# Patient Record
Sex: FEMALE | Race: WHITE | Employment: FULL TIME | URBAN - METROPOLITAN AREA
[De-identification: names, ages, dates, MRNs, and addresses within clinical notes are randomized per-mention and may not be internally consistent; named-entity substitution may affect disease eponyms.]

---

## 2017-02-28 ENCOUNTER — HOSPITAL ENCOUNTER (OUTPATIENT)
Dept: ULTRASOUND IMAGING | Age: 32
Discharge: HOME | End: 2017-02-28
Attending: NURSE PRACTITIONER
Payer: COMMERCIAL

## 2017-02-28 DIAGNOSIS — M79.605 PAIN OF LEFT LOWER EXTREMITY: ICD-10-CM

## 2017-02-28 PROCEDURE — 93971 EXTREMITY STUDY: CPT

## 2017-03-28 PROBLEM — I83.892 SYMPTOMATIC VARICOSE VEINS OF LEFT LOWER EXTREMITY: Chronic | Status: ACTIVE | Noted: 2017-03-28

## 2017-06-12 ENCOUNTER — HOSPITAL ENCOUNTER (OUTPATIENT)
Dept: LAB | Age: 32
Discharge: HOME | End: 2017-06-12
Payer: COMMERCIAL

## 2017-06-12 LAB — COLLECTION COMMENT, COLCM: NORMAL

## 2017-06-12 PROCEDURE — 87070 CULTURE OTHR SPECIMN AEROBIC: CPT | Performed by: OTOLARYNGOLOGY

## 2017-06-12 PROCEDURE — 87077 CULTURE AEROBIC IDENTIFY: CPT | Performed by: OTOLARYNGOLOGY

## 2017-06-12 PROCEDURE — 87186 SC STD MICRODIL/AGAR DIL: CPT | Performed by: OTOLARYNGOLOGY

## 2017-06-14 LAB
BACTERIA SPEC CULT: ABNORMAL
SERVICE CMNT-IMP: ABNORMAL

## 2017-07-21 ENCOUNTER — HOSPITAL ENCOUNTER (OUTPATIENT)
Dept: CT IMAGING | Age: 32
Discharge: HOME | End: 2017-07-21
Attending: FAMILY MEDICINE
Payer: COMMERCIAL

## 2017-07-21 DIAGNOSIS — J32.9 CHRONIC SINUSITIS, UNSPECIFIED LOCATION: ICD-10-CM

## 2017-07-21 PROCEDURE — 70486 CT MAXILLOFACIAL W/O DYE: CPT

## 2017-11-16 ENCOUNTER — HOSPITAL ENCOUNTER (OUTPATIENT)
Dept: CT IMAGING | Age: 32
Discharge: HOME | End: 2017-11-16
Attending: UROLOGY
Payer: COMMERCIAL

## 2017-11-16 DIAGNOSIS — R10.9 FLANK PAIN: ICD-10-CM

## 2017-11-16 PROCEDURE — 74176 CT ABD & PELVIS W/O CONTRAST: CPT

## 2018-03-30 ENCOUNTER — APPOINTMENT (OUTPATIENT)
Dept: ULTRASOUND IMAGING | Age: 33
End: 2018-03-30
Attending: EMERGENCY MEDICINE
Payer: COMMERCIAL

## 2018-03-30 ENCOUNTER — HOSPITAL ENCOUNTER (EMERGENCY)
Age: 33
Discharge: HOME | End: 2018-03-30
Attending: EMERGENCY MEDICINE
Payer: COMMERCIAL

## 2018-03-30 ENCOUNTER — APPOINTMENT (OUTPATIENT)
Dept: CT IMAGING | Age: 33
End: 2018-03-30
Attending: EMERGENCY MEDICINE
Payer: COMMERCIAL

## 2018-03-30 VITALS
OXYGEN SATURATION: 99 % | RESPIRATION RATE: 18 BRPM | HEIGHT: 61 IN | SYSTOLIC BLOOD PRESSURE: 111 MMHG | WEIGHT: 170 LBS | HEART RATE: 89 BPM | BODY MASS INDEX: 32.1 KG/M2 | TEMPERATURE: 98.1 F | DIASTOLIC BLOOD PRESSURE: 71 MMHG

## 2018-03-30 DIAGNOSIS — R10.84 ABDOMINAL PAIN, GENERALIZED: Primary | ICD-10-CM

## 2018-03-30 LAB
ALBUMIN SERPL-MCNC: 3.3 G/DL (ref 3.5–5)
ALBUMIN/GLOB SERPL: 1 {RATIO} (ref 1.2–3.5)
ALP SERPL-CCNC: 50 U/L (ref 50–136)
ALT SERPL-CCNC: 20 U/L (ref 12–65)
ANION GAP SERPL CALC-SCNC: 6 MMOL/L (ref 7–16)
AST SERPL-CCNC: 22 U/L (ref 15–37)
BASOPHILS # BLD: 0 K/UL (ref 0–0.2)
BASOPHILS NFR BLD: 1 % (ref 0–2)
BILIRUB SERPL-MCNC: 0.2 MG/DL (ref 0.2–1.1)
BUN SERPL-MCNC: 13 MG/DL (ref 6–23)
CALCIUM SERPL-MCNC: 8.6 MG/DL (ref 8.3–10.4)
CHLORIDE SERPL-SCNC: 109 MMOL/L (ref 98–107)
CO2 SERPL-SCNC: 26 MMOL/L (ref 21–32)
CREAT SERPL-MCNC: 0.79 MG/DL (ref 0.6–1)
DIFFERENTIAL METHOD BLD: ABNORMAL
EOSINOPHIL # BLD: 0.2 K/UL (ref 0–0.8)
EOSINOPHIL NFR BLD: 4 % (ref 0.5–7.8)
ERYTHROCYTE [DISTWIDTH] IN BLOOD BY AUTOMATED COUNT: 18.3 % (ref 11.9–14.6)
GLOBULIN SER CALC-MCNC: 3.4 G/DL (ref 2.3–3.5)
GLUCOSE SERPL-MCNC: 88 MG/DL (ref 65–100)
HCG UR QL: NEGATIVE
HCT VFR BLD AUTO: 27.2 % (ref 35.8–46.3)
HGB BLD-MCNC: 8.2 G/DL (ref 11.7–15.4)
IMM GRANULOCYTES # BLD: 0 K/UL (ref 0–0.5)
IMM GRANULOCYTES NFR BLD AUTO: 0 % (ref 0–5)
LIPASE SERPL-CCNC: 95 U/L (ref 73–393)
LYMPHOCYTES # BLD: 2.6 K/UL (ref 0.5–4.6)
LYMPHOCYTES NFR BLD: 43 % (ref 13–44)
MCH RBC QN AUTO: 20.7 PG (ref 26.1–32.9)
MCHC RBC AUTO-ENTMCNC: 30.1 G/DL (ref 31.4–35)
MCV RBC AUTO: 68.7 FL (ref 79.6–97.8)
MONOCYTES # BLD: 0.5 K/UL (ref 0.1–1.3)
MONOCYTES NFR BLD: 8 % (ref 4–12)
NEUTS SEG # BLD: 2.7 K/UL (ref 1.7–8.2)
NEUTS SEG NFR BLD: 44 % (ref 43–78)
PLATELET # BLD AUTO: 403 K/UL (ref 150–450)
PMV BLD AUTO: 9.2 FL (ref 10.8–14.1)
POTASSIUM SERPL-SCNC: 3.6 MMOL/L (ref 3.5–5.1)
PROT SERPL-MCNC: 6.7 G/DL (ref 6.3–8.2)
RBC # BLD AUTO: 3.96 M/UL (ref 4.05–5.25)
SODIUM SERPL-SCNC: 141 MMOL/L (ref 136–145)
WBC # BLD AUTO: 6.1 K/UL (ref 4.3–11.1)

## 2018-03-30 PROCEDURE — 74011636320 HC RX REV CODE- 636/320: Performed by: EMERGENCY MEDICINE

## 2018-03-30 PROCEDURE — 76856 US EXAM PELVIC COMPLETE: CPT

## 2018-03-30 PROCEDURE — 96374 THER/PROPH/DIAG INJ IV PUSH: CPT | Performed by: EMERGENCY MEDICINE

## 2018-03-30 PROCEDURE — 74011250637 HC RX REV CODE- 250/637: Performed by: EMERGENCY MEDICINE

## 2018-03-30 PROCEDURE — 74177 CT ABD & PELVIS W/CONTRAST: CPT

## 2018-03-30 PROCEDURE — 96361 HYDRATE IV INFUSION ADD-ON: CPT | Performed by: EMERGENCY MEDICINE

## 2018-03-30 PROCEDURE — 85025 COMPLETE CBC W/AUTO DIFF WBC: CPT | Performed by: EMERGENCY MEDICINE

## 2018-03-30 PROCEDURE — 74011250636 HC RX REV CODE- 250/636: Performed by: EMERGENCY MEDICINE

## 2018-03-30 PROCEDURE — 96375 TX/PRO/DX INJ NEW DRUG ADDON: CPT | Performed by: EMERGENCY MEDICINE

## 2018-03-30 PROCEDURE — 80053 COMPREHEN METABOLIC PANEL: CPT | Performed by: EMERGENCY MEDICINE

## 2018-03-30 PROCEDURE — 74011000258 HC RX REV CODE- 258: Performed by: EMERGENCY MEDICINE

## 2018-03-30 PROCEDURE — 83690 ASSAY OF LIPASE: CPT | Performed by: EMERGENCY MEDICINE

## 2018-03-30 PROCEDURE — 81003 URINALYSIS AUTO W/O SCOPE: CPT | Performed by: EMERGENCY MEDICINE

## 2018-03-30 PROCEDURE — 96376 TX/PRO/DX INJ SAME DRUG ADON: CPT | Performed by: EMERGENCY MEDICINE

## 2018-03-30 PROCEDURE — 99284 EMERGENCY DEPT VISIT MOD MDM: CPT | Performed by: EMERGENCY MEDICINE

## 2018-03-30 PROCEDURE — 81025 URINE PREGNANCY TEST: CPT

## 2018-03-30 RX ORDER — MORPHINE SULFATE 4 MG/ML
4 INJECTION, SOLUTION INTRAMUSCULAR; INTRAVENOUS
Status: COMPLETED | OUTPATIENT
Start: 2018-03-30 | End: 2018-03-30

## 2018-03-30 RX ORDER — TRAMADOL HYDROCHLORIDE 50 MG/1
50-100 TABLET ORAL
Qty: 13 TAB | Refills: 0 | Status: SHIPPED | OUTPATIENT
Start: 2018-03-30 | End: 2018-04-04 | Stop reason: CLARIF

## 2018-03-30 RX ORDER — DICLOFENAC SODIUM 50 MG/1
50 TABLET, DELAYED RELEASE ORAL 2 TIMES DAILY
Qty: 14 TAB | Refills: 0 | Status: SHIPPED | OUTPATIENT
Start: 2018-03-30 | End: 2018-03-30

## 2018-03-30 RX ORDER — SODIUM CHLORIDE 0.9 % (FLUSH) 0.9 %
10 SYRINGE (ML) INJECTION
Status: COMPLETED | OUTPATIENT
Start: 2018-03-30 | End: 2018-03-30

## 2018-03-30 RX ORDER — ACETAMINOPHEN 500 MG
1000 TABLET ORAL
Status: COMPLETED | OUTPATIENT
Start: 2018-03-30 | End: 2018-03-30

## 2018-03-30 RX ORDER — ONDANSETRON 2 MG/ML
4 INJECTION INTRAMUSCULAR; INTRAVENOUS
Status: COMPLETED | OUTPATIENT
Start: 2018-03-30 | End: 2018-03-30

## 2018-03-30 RX ADMIN — SODIUM CHLORIDE 1000 ML: 900 INJECTION, SOLUTION INTRAVENOUS at 12:26

## 2018-03-30 RX ADMIN — IOPAMIDOL 100 ML: 755 INJECTION, SOLUTION INTRAVENOUS at 13:53

## 2018-03-30 RX ADMIN — MORPHINE SULFATE 4 MG: 4 INJECTION, SOLUTION INTRAMUSCULAR; INTRAVENOUS at 13:13

## 2018-03-30 RX ADMIN — SODIUM CHLORIDE 100 ML: 900 INJECTION, SOLUTION INTRAVENOUS at 13:53

## 2018-03-30 RX ADMIN — DIATRIZOATE MEGLUMINE AND DIATRIZOATE SODIUM 15 ML: 600; 100 SOLUTION ORAL; RECTAL at 12:13

## 2018-03-30 RX ADMIN — ACETAMINOPHEN 1000 MG: 500 TABLET, FILM COATED ORAL at 16:37

## 2018-03-30 RX ADMIN — MORPHINE SULFATE 4 MG: 4 INJECTION, SOLUTION INTRAMUSCULAR; INTRAVENOUS at 12:27

## 2018-03-30 RX ADMIN — ONDANSETRON 4 MG: 2 INJECTION INTRAMUSCULAR; INTRAVENOUS at 13:13

## 2018-03-30 RX ADMIN — Medication 10 ML: at 13:53

## 2018-03-30 RX ADMIN — ONDANSETRON 4 MG: 2 INJECTION INTRAMUSCULAR; INTRAVENOUS at 12:27

## 2018-03-30 NOTE — LETTER
[address] [facility] EMERGENCY DEPT One [address] [place] [id number] 
[phone] Work/School Note Date: 3/30/2018 To Whom It May concern: 
 
Maia Terry was seen and treated today in the emergency room by the following provider(s): 
Attending Provider: Clarke Ahumada MD. Maia Terry please excuse her from work for up to 2 days due to her emergency department visit. Sincerely, Clarke Ahumada MD

## 2018-03-30 NOTE — ED NOTES
I have reviewed discharge instructions with the patient. The patient verbalized understanding. Patient left ED via Discharge Method: ambulatory to Home with self. Opportunity for questions and clarification provided. Patient given 1 scripts. To continue your aftercare when you leave the hospital, you may receive an automated call from our care team to check in on how you are doing. This is a free service and part of our promise to provide the best care and service to meet your aftercare needs.  If you have questions, or wish to unsubscribe from this service please call [phone]. Thank you for Choosing our [facility].

## 2018-03-30 NOTE — DISCHARGE INSTRUCTIONS
As we discussed, we did not find the exact cause of your symptoms today in the emergency department. You did have a small ovarian cyst certainly could be contributing to your symptoms. Therefore, it is important for you to follow up with your primary care doctor or your gynecologist for further evaluation. Please return to the emergency department with any fevers, vomiting, pelvic symptoms, or additional concerns.

## 2018-03-30 NOTE — ED PROVIDER NOTES
HPI Comments: 37-year-old lady presents with concerns about pain in her right side. She says this pain has gotten worse over the past couple of days and it feels like the right half of her abdomen is becoming firm. She notes that she has a history of a gastric bypass surgery about 8 or 9 years ago and she's only had one complication, and ulcer since then. With this pain she has had some nausea and nonbloody nonbilious vomiting. She has also had diarrhea. She has had no blood in her bowels. She says that during the bypass they did take her gallbladder but it did not take her appendix or pelvic organs. Elements of this note were created using speech recognition software. As such, errors of speech recognition may be present. Patient is a 34 y.o. female presenting with abdominal pain. The history is provided by the patient. Abdominal Pain    Associated symptoms include diarrhea, nausea and vomiting. Pertinent negatives include no fever, no dysuria, no hematuria, no headaches, no arthralgias, no myalgias and no chest pain.         Past Medical History:   Diagnosis Date    Allergic rhinitis     Alopecia areata 2015    Dr Aury Noriega: s/p steroid injection    Asthma dx age 25    albuterol inhaler as needed- last needed 12/2015    Asthma exacerbation 12/21/2015    Contraception management 1/21/2014    Encounter for insertion of mirena IUD 12/17/2015    GERD (gastroesophageal reflux disease)     Headache     LSIL (low grade squamous intraepithelial lesion) on Pap smear 7/7/2014    Nasal polyposis     Recurrent cystitis     neg cx, renal ultrasound normal, s/p uro evla, deferred cysto, possible IC    SVT (supraventricular tachycardia) ([address] 12/4/2013    s/p echo (2013- normal); occasionally will have palpitations but denies any medications at this time    Symptomatic varicose veins of left lower extremity 3/28/2017    Thromboembolus ([address]     Ulcer ([address] 8/2012    revision of gastric bypass (excision of ulcer); managed with protonix BID    UTI (urinary tract infection)     Vaginitis     Varicose veins of legs        Past Surgical History:   Procedure Laterality Date    HX GASTRIC BYPASS  08/2009     and 2012 was revision with excision ulcer    HX GYN      x2 vaginal birth    HX LAP CHOLECYSTECTOMY  2010    HX OTHER SURGICAL  2010,7/2016    scraped sinuses/ulcer surgery    HX TONSILLECTOMY  2008    HX WISDOM TEETH EXTRACTION      SINUS SURGERY [address] UNLISTED  2008, 2017    Deviated septum         Family History:   Problem Relation Age of Onset    Thyroid Disease Mother      Graves Disease    Other Mother      VV    Cancer Maternal Grandfather      Lung    Emphysema Paternal Grandmother     Breast Cancer Maternal Aunt     Other Maternal Aunt      vv       Social History     Social History    Marital status: SINGLE     Spouse name: N/A    Number of children: N/A    Years of education: N/A     Occupational History    Not on file. Social History Main Topics    Smoking status: Never Smoker    Smokeless tobacco: Never Used    Alcohol use No    Drug use: No    Sexual activity: Yes     Partners: Male     Birth control/ protection: IUD     Other Topics Concern    Not on file     Social History Narrative    Has 2 children    [employer] Employee         ALLERGIES: Lactose; Levaquin [levofloxacin]; and Penicillins    Review of Systems   Constitutional: Negative for chills, diaphoresis and fever. HENT: Negative for congestion, rhinorrhea and sore throat. Eyes: Negative for redness and visual disturbance. Respiratory: Negative for cough, chest tightness, shortness of breath and wheezing. Cardiovascular: Negative for chest pain and palpitations. Gastrointestinal: Positive for abdominal pain, diarrhea, nausea and vomiting. Negative for blood in stool. Endocrine: Negative for polydipsia and polyuria. Genitourinary: Negative for dysuria and hematuria.    Musculoskeletal: Negative for arthralgias, myalgias and neck stiffness. Skin: Negative for rash. Allergic/Immunologic: Negative for environmental allergies and food allergies. Neurological: Negative for dizziness, weakness and headaches. Hematological: Negative for adenopathy. Does not bruise/bleed easily. Psychiatric/Behavioral: Negative for confusion and sleep disturbance. The patient is not nervous/anxious. Vitals:    03/30/18 1159   BP: 114/81   Pulse: 96   Resp: 18   Temp: 98.8 °F (37.1 °C)   SpO2: 100%   Weight: 77.1 kg (170 lb)   Height: 5' 1\" (1.549 m)            Physical Exam   Constitutional: She is oriented to person, place, and time. She appears well-developed and well-nourished. HENT:   Head: Normocephalic and atraumatic. Eyes: Conjunctivae and EOM are normal. Pupils are equal, round, and reactive to light. Neck: Normal range of motion. Cardiovascular: Normal rate and regular rhythm. Pulmonary/Chest: Effort normal and breath sounds normal. No respiratory distress. She has no wheezes. She has no rales. She exhibits no tenderness. Abdominal: Soft. Bowel sounds are normal. There is tenderness. There is no rebound and no guarding. Patient has tenderness in her right upper and right lower quadrant with no rebound or guarding. She has normal bowel sounds   Musculoskeletal: Normal range of motion. She exhibits no edema or tenderness. Lymphadenopathy:     She has no cervical adenopathy. Neurological: She is alert and oriented to person, place, and time. Skin: Skin is warm and dry. Psychiatric: She has a normal mood and affect. Nursing note and vitals reviewed. MDM  Number of Diagnoses or Management Options  Diagnosis management comments: Given her history of previous surgery I will get a CT scan for further evaluation. I will also check her lipase, her LFTs, and her CBC. I will treat her symptoms with IV morphine and Zofran and then progress from there.     3:06 PM  CT scan showed some pelvic fluid but no other findings at this point.   I will do a ultrasound to evaluate for TOA or other pelvic inflammatory process or ovarian cyst.        ED Course       Procedures

## 2018-04-05 ENCOUNTER — ANESTHESIA (OUTPATIENT)
Dept: ENDOSCOPY | Age: 33
End: 2018-04-05
Payer: COMMERCIAL

## 2018-04-05 ENCOUNTER — HOSPITAL ENCOUNTER (OUTPATIENT)
Age: 33
Setting detail: OUTPATIENT SURGERY
Discharge: HOME | End: 2018-04-05
Attending: INTERNAL MEDICINE | Admitting: INTERNAL MEDICINE
Payer: COMMERCIAL

## 2018-04-05 ENCOUNTER — ANESTHESIA EVENT (OUTPATIENT)
Dept: ENDOSCOPY | Age: 33
End: 2018-04-05
Payer: COMMERCIAL

## 2018-04-05 VITALS
HEIGHT: 61 IN | HEART RATE: 78 BPM | RESPIRATION RATE: 12 BRPM | SYSTOLIC BLOOD PRESSURE: 105 MMHG | OXYGEN SATURATION: 99 % | BODY MASS INDEX: 32.1 KG/M2 | DIASTOLIC BLOOD PRESSURE: 70 MMHG | WEIGHT: 170 LBS | TEMPERATURE: 98 F

## 2018-04-05 PROCEDURE — 76060000031 HC ANESTHESIA FIRST 0.5 HR: Performed by: INTERNAL MEDICINE

## 2018-04-05 PROCEDURE — 74011000250 HC RX REV CODE- 250

## 2018-04-05 PROCEDURE — 74011250636 HC RX REV CODE- 250/636: Performed by: ANESTHESIOLOGY

## 2018-04-05 PROCEDURE — 76040000025: Performed by: INTERNAL MEDICINE

## 2018-04-05 PROCEDURE — 74011250636 HC RX REV CODE- 250/636

## 2018-04-05 RX ORDER — PROPOFOL 10 MG/ML
INJECTION, EMULSION INTRAVENOUS
Status: DISCONTINUED | OUTPATIENT
Start: 2018-04-05 | End: 2018-04-05 | Stop reason: HOSPADM

## 2018-04-05 RX ORDER — SODIUM CHLORIDE 9 MG/ML
25 INJECTION, SOLUTION INTRAVENOUS CONTINUOUS
Status: DISCONTINUED | OUTPATIENT
Start: 2018-04-05 | End: 2018-04-05 | Stop reason: HOSPADM

## 2018-04-05 RX ORDER — SODIUM CHLORIDE, SODIUM LACTATE, POTASSIUM CHLORIDE, CALCIUM CHLORIDE 600; 310; 30; 20 MG/100ML; MG/100ML; MG/100ML; MG/100ML
75 INJECTION, SOLUTION INTRAVENOUS CONTINUOUS
Status: DISCONTINUED | OUTPATIENT
Start: 2018-04-05 | End: 2018-04-05 | Stop reason: HOSPADM

## 2018-04-05 RX ORDER — PROPOFOL 10 MG/ML
INJECTION, EMULSION INTRAVENOUS AS NEEDED
Status: DISCONTINUED | OUTPATIENT
Start: 2018-04-05 | End: 2018-04-05 | Stop reason: HOSPADM

## 2018-04-05 RX ORDER — LIDOCAINE HYDROCHLORIDE 20 MG/ML
INJECTION, SOLUTION EPIDURAL; INFILTRATION; INTRACAUDAL; PERINEURAL AS NEEDED
Status: DISCONTINUED | OUTPATIENT
Start: 2018-04-05 | End: 2018-04-05 | Stop reason: HOSPADM

## 2018-04-05 RX ADMIN — LIDOCAINE HYDROCHLORIDE 100 MG: 20 INJECTION, SOLUTION EPIDURAL; INFILTRATION; INTRACAUDAL; PERINEURAL at 08:58

## 2018-04-05 RX ADMIN — SODIUM CHLORIDE, SODIUM LACTATE, POTASSIUM CHLORIDE, AND CALCIUM CHLORIDE 75 ML/HR: 600; 310; 30; 20 INJECTION, SOLUTION INTRAVENOUS at 08:43

## 2018-04-05 RX ADMIN — PROPOFOL 140 MCG/KG/MIN: 10 INJECTION, EMULSION INTRAVENOUS at 08:58

## 2018-04-05 RX ADMIN — PROPOFOL 100 MG: 10 INJECTION, EMULSION INTRAVENOUS at 08:58

## 2018-04-05 NOTE — ANESTHESIA POSTPROCEDURE EVALUATION
Post-Anesthesia Evaluation and Assessment    Patient: Jose Mckay MRN: [id number]  SSN: [id number]    YOB: 1985  Age: 34 y.o. Sex: female       Cardiovascular Function/Vital Signs  Visit Vitals    /70    Pulse 78    Temp 36.7 °C (98 °F)    Resp 14    Ht 5' 1\" (1.549 m)    Wt 77.1 kg (170 lb)    SpO2 99%    BMI 32.12 kg/m2       Patient is status post total IV anesthesia anesthesia for Procedure(s):  ESOPHAGOGASTRODUODENOSCOPY (EGD)/ 34. Nausea/Vomiting: None    Postoperative hydration reviewed and adequate. Pain:  Pain Scale 1: Numeric (0 - 10) (04/05/18 0917)  Pain Intensity 1: 0 (04/05/18 0917)   Managed    Neurological Status: At baseline    Mental Status and Level of Consciousness: Arousable    Pulmonary Status:   O2 Device: Room air (04/05/18 0917)   Adequate oxygenation and airway patent    Complications related to anesthesia: None    Post-anesthesia assessment completed.  No concerns    Signed By: Dipak Chavez MD     April 5, 2018

## 2018-04-05 NOTE — ANESTHESIA PREPROCEDURE EVALUATION
Anesthetic History               Review of Systems / Medical History  Patient summary reviewed    Pulmonary            Asthma        Neuro/Psych              Cardiovascular            Dysrhythmias : SVT      Exercise tolerance: >4 METS     GI/Hepatic/Renal                Endo/Other             Other Findings              Physical Exam    Airway  Mallampati: II  TM Distance: > 6 cm  Neck ROM: normal range of motion        Cardiovascular  Regular rate and rhythm,  S1 and S2 normal,  no murmur, click, rub, or gallop             Dental  No notable dental hx       Pulmonary  Breath sounds clear to auscultation               Abdominal         Other Findings            Anesthetic Plan    ASA: 2  Anesthesia type: total IV anesthesia            Anesthetic plan and risks discussed with: Patient

## 2018-04-05 NOTE — OP NOTES
Endoscopic Gastroduodenoscopy Procedure Note    Indications: Melena, history of gastric bypass    Anesthesia/Sedation: MAC IV          Procedure Details     Informed consent was obtained for the procedure, including conscious sedation. Risks of infection, perforation, hemorrhage, adverse drug reaction and aspiration were discussed. The patient was placed in the left lateral decubitus position. She was monitored continuously with ECG tracing, pulse oximetry, blood pressure monitoring, and direct observation. The [id number] gastroscope was inserted into the mouth and advanced under direct vision to the jejunum. A careful inspection was made as the gastroscope was withdrawn, including a retroflexed view of the proximal stomach; findings and interventions are described below. Appropriate photodocumentation was obtained. Findings: The esophagus appeared normal. The anatomy was that of a saad-en-y gastric bypass. There was a 1 cm clean based anastomosis ulcer at the gastrojejunal anastomosis, on the jejunal side. The jeununum was intubated and appeared normal.       Specimens: none     Estimated Blood Loss: None           Complications:   None; patient tolerated the procedure well. Attending Attestation:  I performed the procedure. Impression:    Clean based anastomosis ulcer on jejunal side     Recommendations:  1. Follow up with referring MD   2. Continue PPI, will add Carafate   3.  Avoid NSAIDs

## 2018-04-05 NOTE — H&P
[facility] Pre Op H and P          Chief Complaint:  melena    Subjective:     History of Present Illness:  Patient is a 34 y.o. With history of RYGB presents as an outpatient for melena    PMH:  Past Medical History:   Diagnosis Date    Allergic rhinitis     Alopecia areata 2015    Dr Gabe Hurt: s/p steroid injection    Asthma dx age 25    last used rescue inhaler in March 2018- better now- denies SOB with 1 flight of steps    Asthma exacerbation 12/21/2015    Contraception management 1/21/2014    Encounter for insertion of mirena IUD 12/17/2015    GERD (gastroesophageal reflux disease)     controlled with protonix    Headache     LSIL (low grade squamous intraepithelial lesion) on Pap smear 7/7/2014    Nasal polyposis     PUD (peptic ulcer disease)     hx of GI ulcers since 2011    Recurrent cystitis     neg cx, renal ultrasound normal, s/p uro evla, deferred cysto, possible IC    SVT ([address]raventricular tachy[address]) [address] 12/4/2013    s/p echo (2013- normal); occasionally will have palpitations but denies any medications at this time    Symptomatic varicose veins of left lower extremity 3/28/2017    [facility]romboembolus [facility])     Ulcer 8/2012    revision of gastric bypass (excision of ulcer); managed with protonix BID    UTI (urinary tract infection)     Vaginitis     Varicose veins of legs        PSH:  Past Surgical History:   Procedure Laterality Date    HX GASTRIC BYPASS  08/2009     and 2012 was revision with excision ulcer    HX GYN      x2 vaginal birth    HX LAP CHOLECYSTECTOMY  2010    HX OTHER SURGICAL  2010,7/2016    scraped sinuses/ulcer surgery    HX TONSILLECTOMY  2008    HX WISDOM TEETH EXTRACTION      SINUS SURGERY PROC UNLISTED  2008, 2017    Deviated septum       Allergies:   Allergies   Allergen Reactions    Lactose Diarrhea    Levaquin [Levofloxacin] Other (comments)     Chest/back pain    Penicillins Other (comments)     UTI sx per pt       Home Medications:  Prior to Admission medications    Medication Sig Start Date End Date Taking? Authorizing Provider   HYDROcodone-acetaminophen (NORCO) 5-325 mg per tablet Take 1 Tab by mouth every six (6) hours as needed for Pain for up to 30 days. Max Daily Amount: 4 Tabs. Indications: Pain 3/31/18 4/30/18 Yes Yeni Alvarez MD   LORazepam (ATIVAN) 0.5 mg tablet Take 1 Tab by mouth daily as needed for Anxiety. 1 - 2 q6h prn severe anxiety  Patient taking differently: Take 0.5 mg by mouth daily as needed for Anxiety. 1 - 2 q6h prn severe anxiety  Pt has not taken in 2 wks ( mid March 2018) 3/21/18  Yes Alvino Cassidy MD   fluticasone-salmeterol (ADVAIR) 250-50 mcg/dose diskus inhaler Take 1 Puff by inhalation two (2) times a day. 3/21/18  Yes Alvino Cassidy MD   DULoxetine (CYMBALTA) 60 mg capsule 1 every day 1/18/18  Yes Alvino Cassidy MD   pantoprazole (PROTONIX) 40 mg tablet 1 bid 1/12/18  Yes Alvino Cassidy MD   zolpidem (AMBIEN) 10 mg tablet Half to 1 tab po qpm prn sleep 10/25/17  Yes Alvino Cassidy MD   promethazine (PHENERGAN) 12.5 mg tablet 1 to 2 tabs po [id number] as needed for nausea 3/19/18   Alvino Cassidy MD   albuterol (VENTOLIN HFA) 90 mcg/actuation inhaler Take 2 Puffs by inhalation every four (4) hours as needed for Wheezing or Shortness of Breath (cough). 5/2/17   Amanda Guajardo NP   omalizumab Keli Daniels) 150 mg solr by SubCUTAneous route once. Every 2 weeks. Indications: REFRACTORY EXTRINSIC ASTHMA    Historical Provider   levonorgestrel (MIRENA) 20 mcg/24 hr (5 years) IUD 1 Each by IntraUTERine route once. Indications: placed 12/17/15    Historical Provider   NEBULIZER by Does Not Apply route.     Historical Provider       Hospital Medications:  Current Facility-Administered Medications   Medication Dose Route Frequency    lactated Ringers infusion  75 mL/hr IntraVENous CONTINUOUS    0.9% sodium chloride infusion  25 mL/hr IntraVENous CONTINUOUS       Social History:  Social History   Substance Use Topics    Smoking status: Never Smoker    Smokeless tobacco: Never Used    Alcohol use No     Pt denies any history of IV drug use, blood transfusions, tattoos     Family History:  Family History   Problem Relation Age of Onset    Thyroid Disease Mother      Graves Disease    Other Mother      varicose veins    No Known Problems Father     No Known Problems Brother     Cancer Maternal Grandfather      Lung    Emphysema Paternal Grandmother     Breast Cancer Maternal Aunt     Other Maternal Aunt      vv       Review of Systems:  A detailed 10 system ROS is obtained, with pertinent positives as listed above. All others are negative. Diet:      Objective:     Physical Exam:  Vitals:  Visit Vitals    /70    Pulse 79    Temp 98.1 °F (36.7 °C)    Resp 14    Ht 5' 1\" (1.549 m)    Wt 77.1 kg (170 lb)    SpO2 99%    BMI 32.12 kg/m2     Gen:  Pt is alert, cooperative, no acute distress  Skin:  Extremities and face reveal no rashes. HEENT: Sclerae anicteric. Extra-occular muscles are intact. No oral ulcers. The neck is supple. Cardiovascular: Regular rate and rhythm. No murmurs, gallops, or rubs. Respiratory:  Comfortable breathing with no accessory muscle use. Clear breath sounds anteriorly with no wheezes, rales, or rhonchi. GI:  Abdomen nondistended, soft, and nontender. Normal active bowel sounds. No masses palpable. Musculoskeletal:  Extremities have good range of motion. No costovertebral tenderness. Neurological:  Gross memory appears intact. Patient is alert and oriented. Psychiatric:  Mood appears appropriate with judgement intact. Lymphatic:  No cervical or supraclavicular adenopathy. Assessment:       Active Problems:    * No active hospital problems. *      Plan:       EGD as planned.  ASA 2

## 2018-04-05 NOTE — IP AVS SNAPSHOT
Bhargav Verma 
 
 
 [address] [facility] [address] [facility] 
[phone] Patient: Leandro Randall MRN: [id number] DIH:[id number] About your hospitalization You were admitted on:  April 5, 2018 You last received care in the:  [facility] You were discharged on:  April 5, 2018 Why you were hospitalized Your primary diagnosis was:  Not on File Follow-up Information None Discharge Orders None A check dominick indicates which time of day the medication should be taken. My Medications ASK your doctor about these medications Instructions Each Dose to Equal  
 Morning Noon Evening Bedtime  
 albuterol 90 mcg/actuation inhaler Commonly known as:  VENTOLIN HFA Your last dose was: Your next dose is: Take 2 Puffs by inhalation every four (4) hours as needed for Wheezing or Shortness of Breath (cough). 2 Puff DULoxetine 60 mg capsule Commonly known as:  CYMBALTA Your last dose was: Your next dose is:    
   
   
 1 every day  
     
   
   
   
  
 fluticasone-salmeterol 250-50 mcg/dose diskus inhaler Commonly known as:  ADVAIR Your last dose was: Your next dose is: Take 1 Puff by inhalation two (2) times a day. 1 Puff HYDROcodone-acetaminophen 5-325 mg per tablet Commonly known as:  Melissa Fore Your last dose was: Your next dose is: Take 1 Tab by mouth every six (6) hours as needed for Pain for up to 30 days. Max Daily Amount: 4 Tabs. Indications: Pain 1 Tab LORazepam 0.5 mg tablet Commonly known as:  ATIVAN Your last dose was: Your next dose is: Take 1 Tab by mouth daily as needed for Anxiety. 1 - 2 q6h prn severe anxiety 0.5 mg  
    
   
   
   
  
 MIRENA 20 mcg/24 hr (5 years) Iud  
Generic drug:  levonorgestrel Your last dose was: Your next dose is:    
   
   
 1 Each by IntraUTERine route once. Indications: placed 12/17/15  
 1 Each NEBULIZER Your last dose was: Your next dose is:    
   
   
 by Does Not Apply route. pantoprazole 40 mg tablet Commonly known as:  PROTONIX Your last dose was: Your next dose is:    
   
   
 1 bid  
     
   
   
   
  
 promethazine 12.5 mg tablet Commonly known as:  PHENERGAN Your last dose was: Your next dose is:    
   
   
 1 to 2 tabs po [id number] as needed for nausea XOLAIR 150 mg Solr Generic drug:  omalizumab Your last dose was: Your next dose is:    
   
   
 by SubCUTAneous route once. Every 2 weeks. Indications: REFRACTORY EXTRINSIC ASTHMA  
     
   
   
   
  
 zolpidem 10 mg tablet Commonly known as:  AMBIEN Your last dose was: Your next dose is:    
   
   
 Half to 1 tab po qpm prn sleep Opioid Education Prescription Opioids: What You Need to Know: 
 
Prescription opioids can be used to help relieve moderate-to-severe pain and are often prescribed following a surgery or injury, or for certain health conditions. These medications can be an important part of treatment but also come with serious risks. Opioids are strong pain medicines. Examples include hydrocodone, oxycodone, fentanyl, and morphine. Heroin is an example of an illegal opioid. It is important to work with your health care provider to make sure you are getting the safest, most effective care. WHAT ARE THE RISKS AND SIDE EFFECTS OF OPIOID USE? Prescription opioids carry serious risks of addiction and overdose, especially with prolonged use. An opioid overdose, often marked by slow breathing, can cause sudden death.   The use of prescription opioids can have a number of side effects as well, even when taken as directed. · Tolerance-meaning you might need to take more of a medication for the same pain relief · Physical dependence-meaning you have symptoms of withdrawal when the medication is stopped. Withdrawal symptoms can include nausea, sweating, chills, diarrhea, stomach cramps, and muscle aches. Withdrawal can last up to several weeks, depending on which drug you took and how long you took it. · Increased sensitivity to pain · Constipation · Nausea, vomiting, and dry mouth · Sleepiness and dizziness · Confusion · Depression · Low levels of testosterone that can result in lower sex drive, energy, and strength · Itching and sweating RISKS ARE GREATER WITH:      
· History of drug misuse, substance use disorder, or overdose · Mental health conditions (such as depression or anxiety) · Sleep apnea · Older age (72 years or older) · Pregnancy Avoid alcohol while taking prescription opioids. Also, unless specifically advised by your health care provider, medications to avoid include: · Benzodiazepines (such as Xanax or Valium) · Muscle relaxants (such as Soma or Flexeril) · Hypnotics (such as Ambien or Lunesta) · Other prescription opioids KNOW YOUR OPTIONS Talk to your health care provider about ways to manage your pain that don't involve prescription opioids. Some of these options may actually work better and have fewer risks and side effects. Options may include: 
· Pain relievers such as acetaminophen, ibuprofen, and naproxen · Some medications that are also used for depression or seizures · Physical therapy and exercise · Counseling to help patients learn how to cope better with triggers of pain and stress. · Application of heat or cold compress · Massage therapy · Relaxation techniques Be Informed Make sure you know the name of your medication, how much and how often to take it, and its potential risks & side effects. IF YOU ARE PRESCRIBED OPIOIDS FOR PAIN: 
· Never take opioids in greater amounts or more often than prescribed. Remember the goal is not to be pain-free but to manage your pain at a tolerable level. · Follow up with your primary care provider to: · Work together to create a plan on how to manage your pain. · Talk about ways to help manage your pain that don't involve prescription opioids. · Talk about any and all concerns and side effects. · Help prevent misuse and abuse. · Never sell or share prescription opioids · Help prevent misuse and abuse. · Store prescription opioids in a secure place and out of reach of others (this may include visitors, children, friends, and family). · Safely dispose of unused/unwanted prescription opioids: Find your community drug take-back program or your pharmacy mail-back program, or flush them down the toilet, following guidance from the Food and Drug Administration (www.fda.gov/Drugs/ResourcesForYou). · Visit www.cdc.gov/drugoverdose to learn about the risks of opioid abuse and overdose. · If you believe you may be struggling with addiction, tell your health care provider and ask for guidance or call [facility] at 3-682-669-IOBB. Discharge Instructions Gastrointestinal Esophagogastroduodenoscopy (EGD) - Upper Exam Discharge Instructions 1. Call Dr. Willie Aguila for any problems or questions. 2. Contact the doctor's office for follow up appointment as directed. 3. Medication may cause drowsiness for several hours, therefore, do not drive or operate machinery for remainder of the day. 4. No alcohol today. 5. Ordinarily, you may resume regular diet and activity after exam unless otherwise specified by your physician. 6. For mild soreness in your throat you may use Cepacol throat lozenges or warm salt-water gargles as needed. Any additional instructions: 
1. Follow up with referring MD  
2. Continue PPI (acid reducing medication), will add Carafate 3. Avoid NSAIDs (advil, aleve, aspirin, ibuprofen, etc.) Instructions given to Chidi Fuentes and other family members. Introducing [facility]! Dear Halima: Thank you for requesting a [employer] account. Our records indicate that you already have an active [employer] account. You can access your account anytime at [email or web address] Did you know that you can access your hospital and ER discharge instructions at any time in [employer]? You can also review all of your test results from your hospital stay or ER visit. Additional Information If you have questions, please visit the Frequently Asked Questions section of the [employer] website at [email or web address] Remember, [employer] is NOT to be used for urgent needs. For medical emergencies, dial 911. Now available from your iPhone and Android! Introducing Jose Domínguez As a [facility] patient, I wanted to make you aware of our electronic visit tool called Jose Domínguez. [facility] 24/7 allows you to connect within minutes with a medical provider 24 hours a day, seven days a week via a mobile device or tablet or logging into a secure website from your computer. You can access Jose Domínguez from anywhere in the [place]. A virtual visit might be right for you when you have a simple condition and feel like you just dont want to get out of bed, or cant get away from work for an appointment, when your regular [facility] provider is not available (evenings, weekends or holidays), or when youre out of town and need minor care. Electronic visits cost only $49 and if the [facility] 24/7 provider determines a prescription is needed to treat your condition, one can be electronically transmitted to a nearby pharmacy*. Please take a moment to enroll today if you have not already done so. The enrollment process is free and takes just a few minutes. To enroll, please download the [employer] 24/7 estephania to your tablet or phone, or visit [email or web address] to enroll on your computer. And, as an [address] [place] [address] patient with a [employer] account, the results of your visits will be scanned into your electronic medical record and your primary care provider will be able to view the scanned results. We urge you to continue to see your regular [facility] provider for your ongoing medical care. And while your primary care provider may not be the one available when you seek a [facility] virtual visit, the peace of mind you get from getting a real diagnosis real time can be priceless. For more information on [employer], view our Frequently Asked Questions (FAQs) at [email or web address]. Sincerely, 
 
Diane Meyer MD 
Chief Medical Officer [address] [place] [address] *:  certain medications cannot be prescribed via [employer] Providers Seen During Your Hospitalization Provider Specialty Primary office phone Angel Loja MD Gastroenterology [phone] Your Primary Care Physician (PCP) Primary Care Physician Office Phone Office Fax 224 [address] [phone] You are allergic to the following Allergen Reactions Lactose Diarrhea Levaquin (Levofloxacin) Other (comments) Chest/back pain Penicillins Other (comments) UTI sx per pt Recent Documentation Height Weight BMI OB Status Smoking Status 1.549 m 77.1 kg 32.12 kg/m2 IUD Never Smoker Emergency Contacts Name Discharge Info Relation Home Work Mobile JoseloarlineChaitanya  Father [15] [phone] Anahydaniel Mihir DISCHARGE CAREGIVER [3] Mother [14] [phone] Patient Belongings The following personal items are in your possession at time of discharge: 
  Dental Appliances: None  Visual Aid: None Please provide this summary of care documentation to your next provider. Signatures-by signing, you are acknowledging that this After Visit Summary has been reviewed with you and you have received a copy. Patient Signature:  ____________________________________________________________ Date:  ____________________________________________________________  
  
Jerral Oakland Provider Signature:  ____________________________________________________________ Date:  ____________________________________________________________

## 2018-04-05 NOTE — DISCHARGE INSTRUCTIONS
Gastrointestinal Esophagogastroduodenoscopy (EGD) - Upper Exam Discharge Instructions    1. Call Dr. Aníbal Robbins for any problems or questions. 2. Contact the doctor's office for follow up appointment as directed. 3. Medication may cause drowsiness for several hours, therefore, do not drive or operate machinery for remainder of the day. 4. No alcohol today. 5. Ordinarily, you may resume regular diet and activity after exam unless otherwise specified by your physician. 6. For mild soreness in your throat you may use Cepacol throat lozenges or warm salt-water gargles as needed. Any additional instructions:  1. Follow up with referring MD   2. Continue PPI (acid reducing medication), will add Carafate   3. Avoid NSAIDs (advil, aleve, aspirin, ibuprofen, etc.)       Instructions given to Chidi Fuentes and other family members.

## 2018-04-05 NOTE — PROGRESS NOTES
Pt requesting something for pain other than norco, which was prescribed in ER. Pt complains norco makes her itch. Dr. Naveed Buckley notified.  And he stated she shourd follow up with her primary care md.

## 2018-04-05 NOTE — ROUTINE PROCESS
VSS. No complaints noted. Education given and reviewed with father who voiced understanding. Pt wheeled out via wheelchair by Adilson Pennington, student nurse.

## 2018-04-18 ENCOUNTER — HOSPITAL ENCOUNTER (OUTPATIENT)
Dept: LAB | Age: 33
Discharge: HOME | End: 2018-04-18
Payer: COMMERCIAL

## 2018-04-18 LAB
ALBUMIN SERPL-MCNC: 3.4 G/DL (ref 3.5–5)
ALBUMIN/GLOB SERPL: 0.9 {RATIO} (ref 1.2–3.5)
ALP SERPL-CCNC: 58 U/L (ref 50–136)
ALT SERPL-CCNC: 30 U/L (ref 12–65)
ANION GAP SERPL CALC-SCNC: 7 MMOL/L (ref 7–16)
AST SERPL-CCNC: 24 U/L (ref 15–37)
BASOPHILS # BLD: 0 K/UL (ref 0–0.2)
BASOPHILS NFR BLD: 1 % (ref 0–2)
BILIRUB SERPL-MCNC: 0.2 MG/DL (ref 0.2–1.1)
BUN SERPL-MCNC: 14 MG/DL (ref 6–23)
CALCIUM SERPL-MCNC: 8.4 MG/DL (ref 8.3–10.4)
CHLORIDE SERPL-SCNC: 111 MMOL/L (ref 98–107)
CO2 SERPL-SCNC: 25 MMOL/L (ref 21–32)
CREAT SERPL-MCNC: 0.72 MG/DL (ref 0.6–1)
DIFFERENTIAL METHOD BLD: ABNORMAL
EOSINOPHIL # BLD: 0.3 K/UL (ref 0–0.8)
EOSINOPHIL NFR BLD: 6 % (ref 0.5–7.8)
ERYTHROCYTE [DISTWIDTH] IN BLOOD BY AUTOMATED COUNT: 18.9 % (ref 11.9–14.6)
GLOBULIN SER CALC-MCNC: 3.6 G/DL (ref 2.3–3.5)
GLUCOSE SERPL-MCNC: 79 MG/DL (ref 65–100)
HCT VFR BLD AUTO: 29 % (ref 35.8–46.3)
HGB BLD-MCNC: 8.6 G/DL (ref 11.7–15.4)
IMM GRANULOCYTES # BLD: 0 K/UL (ref 0–0.5)
IMM GRANULOCYTES NFR BLD AUTO: 0 % (ref 0–5)
LIPASE SERPL-CCNC: 169 U/L (ref 73–393)
LYMPHOCYTES # BLD: 2.4 K/UL (ref 0.5–4.6)
LYMPHOCYTES NFR BLD: 43 % (ref 13–44)
MCH RBC QN AUTO: 20.7 PG (ref 26.1–32.9)
MCHC RBC AUTO-ENTMCNC: 29.7 G/DL (ref 31.4–35)
MCV RBC AUTO: 69.9 FL (ref 79.6–97.8)
MONOCYTES # BLD: 0.5 K/UL (ref 0.1–1.3)
MONOCYTES NFR BLD: 9 % (ref 4–12)
NEUTS SEG # BLD: 2.3 K/UL (ref 1.7–8.2)
NEUTS SEG NFR BLD: 41 % (ref 43–78)
PLATELET # BLD AUTO: 514 K/UL (ref 150–450)
PMV BLD AUTO: 9.1 FL (ref 10.8–14.1)
POTASSIUM SERPL-SCNC: 4 MMOL/L (ref 3.5–5.1)
PROT SERPL-MCNC: 7 G/DL (ref 6.3–8.2)
RBC # BLD AUTO: 4.15 M/UL (ref 4.05–5.25)
SODIUM SERPL-SCNC: 143 MMOL/L (ref 136–145)
WBC # BLD AUTO: 5.5 K/UL (ref 4.3–11.1)

## 2018-04-18 PROCEDURE — 36415 COLL VENOUS BLD VENIPUNCTURE: CPT | Performed by: NURSE PRACTITIONER

## 2018-04-18 PROCEDURE — 85025 COMPLETE CBC W/AUTO DIFF WBC: CPT | Performed by: NURSE PRACTITIONER

## 2018-04-18 PROCEDURE — 87086 URINE CULTURE/COLONY COUNT: CPT | Performed by: NURSE PRACTITIONER

## 2018-04-18 PROCEDURE — 80053 COMPREHEN METABOLIC PANEL: CPT | Performed by: NURSE PRACTITIONER

## 2018-04-18 PROCEDURE — 83690 ASSAY OF LIPASE: CPT | Performed by: NURSE PRACTITIONER

## 2018-04-20 ENCOUNTER — HOSPITAL ENCOUNTER (OUTPATIENT)
Dept: SURGERY | Age: 33
Discharge: HOME | End: 2018-04-20
Attending: INTERNAL MEDICINE

## 2018-04-20 VITALS — HEIGHT: 61 IN | WEIGHT: 168 LBS | BODY MASS INDEX: 31.72 KG/M2

## 2018-04-20 LAB
BACTERIA SPEC CULT: NORMAL
SERVICE CMNT-IMP: NORMAL

## 2018-04-20 NOTE — PERIOP NOTES
Janey Pichardo, anesthesia, reviewed and ok'd for surgery hgb (4/18/18, 3/31/18,8/18/17,1/30/17). No new orders received.

## 2018-04-20 NOTE — PERIOP NOTES
Patient verified name, , and surgery as listed in [facility]. Type II surgery, phone assessment complete. Orders not received. Labs per surgeon: none  Labs per anesthesia protocol: hgb, (pt with CBC,BMP results from 18 in EMR)  Pt with history of iron deficiency anemia- hgb 18 = 8.6. PCP and surgeon aware. Type & Screen DOS. Will have anesthesia review chart. Patient answered medical/surgical history questions at their best of ability. All prior to admission medications documented in [facility]. Patient instructed to take the following medications the day of surgery according to anesthesia guidelines with a small sip of water: ativan,protonix, Carafate. Pt instructed to use Advair inhaler and to bring albuterol inhaler DOS . Hold all vitamins 7 days prior to surgery and NSAIDS 5 days prior to surgery. Medications to be held none    Patient instructed on the following:  Arrive at A Entrance, time of arrival to be called the day before by 1700  NPO after midnight including gum, mints, and ice chips  Responsible adult must drive patient to the hospital, stay during surgery, and patient will  need supervision 24 hours after anesthesia  Use dial in shower the night before surgery and on the morning of surgery  Leave all valuables (money and jewelry) at home but bring insurance card and ID on DOS  Do not wear make-up, nail polish, lotions, cologne, perfumes, powders, or oil on skin. Patient teach back successful and patient demonstrates knowledge of instruction.

## 2018-04-23 ENCOUNTER — ANESTHESIA EVENT (OUTPATIENT)
Dept: SURGERY | Age: 33
End: 2018-04-23
Payer: COMMERCIAL

## 2018-04-24 ENCOUNTER — HOSPITAL ENCOUNTER (OUTPATIENT)
Age: 33
Setting detail: OUTPATIENT SURGERY
Discharge: HOME | End: 2018-04-24
Attending: SURGERY | Admitting: SURGERY
Payer: COMMERCIAL

## 2018-04-24 ENCOUNTER — ANESTHESIA (OUTPATIENT)
Dept: SURGERY | Age: 33
End: 2018-04-24
Payer: COMMERCIAL

## 2018-04-24 VITALS
HEART RATE: 80 BPM | RESPIRATION RATE: 16 BRPM | OXYGEN SATURATION: 95 % | SYSTOLIC BLOOD PRESSURE: 96 MMHG | TEMPERATURE: 99.5 F | DIASTOLIC BLOOD PRESSURE: 67 MMHG

## 2018-04-24 DIAGNOSIS — K43.9 VENTRAL HERNIA WITHOUT OBSTRUCTION OR GANGRENE: Primary | ICD-10-CM

## 2018-04-24 LAB
ABO + RH BLD: NORMAL
BLOOD GROUP ANTIBODIES SERPL: NORMAL
HCG UR QL: NEGATIVE
SPECIMEN EXP DATE BLD: NORMAL

## 2018-04-24 PROCEDURE — 74011000250 HC RX REV CODE- 250: Performed by: ANESTHESIOLOGY

## 2018-04-24 PROCEDURE — 77030011640 HC PAD GRND REM COVD -A: Performed by: SURGERY

## 2018-04-24 PROCEDURE — 81025 URINE PREGNANCY TEST: CPT

## 2018-04-24 PROCEDURE — 74011250636 HC RX REV CODE- 250/636

## 2018-04-24 PROCEDURE — 76010000160 HC OR TIME 0.5 TO 1 HR INTENSV-TIER 1: Performed by: SURGERY

## 2018-04-24 PROCEDURE — 77030039266 HC ADH SKN EXOFIN S2SG -A: Performed by: SURGERY

## 2018-04-24 PROCEDURE — 77030035045 HC TRCR ENDOSC VRSPRT BLDLSS COVD -B: Performed by: SURGERY

## 2018-04-24 PROCEDURE — 74011250636 HC RX REV CODE- 250/636: Performed by: ANESTHESIOLOGY

## 2018-04-24 PROCEDURE — C1781 MESH (IMPLANTABLE): HCPCS | Performed by: SURGERY

## 2018-04-24 PROCEDURE — 77030032490 HC SLV COMPR SCD KNE COVD -B: Performed by: SURGERY

## 2018-04-24 PROCEDURE — 74011000250 HC RX REV CODE- 250: Performed by: SURGERY

## 2018-04-24 PROCEDURE — 77030008477 HC STYL SATN SLP COVD -A: Performed by: ANESTHESIOLOGY

## 2018-04-24 PROCEDURE — 77030031139 HC SUT VCRL2 J&J -A: Performed by: SURGERY

## 2018-04-24 PROCEDURE — 74011000250 HC RX REV CODE- 250

## 2018-04-24 PROCEDURE — 77030010299 HC STPLR INT COVD -E: Performed by: SURGERY

## 2018-04-24 PROCEDURE — 77030034849: Performed by: SURGERY

## 2018-04-24 PROCEDURE — 74011000258 HC RX REV CODE- 258: Performed by: SURGERY

## 2018-04-24 PROCEDURE — 86900 BLOOD TYPING SEROLOGIC ABO: CPT | Performed by: ANESTHESIOLOGY

## 2018-04-24 PROCEDURE — 77030035051: Performed by: SURGERY

## 2018-04-24 PROCEDURE — 77030034154 HC SHR COAG HARM ACE J&J -F: Performed by: SURGERY

## 2018-04-24 PROCEDURE — 76210000016 HC OR PH I REC 1 TO 1.5 HR: Performed by: SURGERY

## 2018-04-24 PROCEDURE — 77030008703 HC TU ET UNCUF COVD -A: Performed by: ANESTHESIOLOGY

## 2018-04-24 PROCEDURE — 77030008522 HC TBNG INSUF LAPRO STRY -B: Performed by: SURGERY

## 2018-04-24 PROCEDURE — 77030035048 HC TRCR ENDOSC OPTCL COVD -B: Performed by: SURGERY

## 2018-04-24 PROCEDURE — 77030020782 HC GWN BAIR PAWS FLX 3M -B: Performed by: ANESTHESIOLOGY

## 2018-04-24 PROCEDURE — 76060000032 HC ANESTHESIA 0.5 TO 1 HR: Performed by: SURGERY

## 2018-04-24 DEVICE — MESH HERN L DIA8CM VENTRAL POLYPR EPTFE CIR SELF EXP PTCH: Type: IMPLANTABLE DEVICE | Site: ABDOMEN | Status: FUNCTIONAL

## 2018-04-24 RX ORDER — ROCURONIUM BROMIDE 10 MG/ML
INJECTION, SOLUTION INTRAVENOUS AS NEEDED
Status: DISCONTINUED | OUTPATIENT
Start: 2018-04-24 | End: 2018-04-24 | Stop reason: HOSPADM

## 2018-04-24 RX ORDER — NEOSTIGMINE METHYLSULFATE 1 MG/ML
INJECTION INTRAVENOUS AS NEEDED
Status: DISCONTINUED | OUTPATIENT
Start: 2018-04-24 | End: 2018-04-24 | Stop reason: HOSPADM

## 2018-04-24 RX ORDER — BUPIVACAINE HYDROCHLORIDE 5 MG/ML
INJECTION, SOLUTION EPIDURAL; INTRACAUDAL AS NEEDED
Status: DISCONTINUED | OUTPATIENT
Start: 2018-04-24 | End: 2018-04-24 | Stop reason: HOSPADM

## 2018-04-24 RX ORDER — KETOROLAC TROMETHAMINE 30 MG/ML
INJECTION, SOLUTION INTRAMUSCULAR; INTRAVENOUS AS NEEDED
Status: DISCONTINUED | OUTPATIENT
Start: 2018-04-24 | End: 2018-04-24 | Stop reason: HOSPADM

## 2018-04-24 RX ORDER — EPHEDRINE SULFATE 50 MG/ML
INJECTION, SOLUTION INTRAVENOUS AS NEEDED
Status: DISCONTINUED | OUTPATIENT
Start: 2018-04-24 | End: 2018-04-24 | Stop reason: HOSPADM

## 2018-04-24 RX ORDER — SODIUM CHLORIDE, SODIUM LACTATE, POTASSIUM CHLORIDE, CALCIUM CHLORIDE 600; 310; 30; 20 MG/100ML; MG/100ML; MG/100ML; MG/100ML
100 INJECTION, SOLUTION INTRAVENOUS CONTINUOUS
Status: DISCONTINUED | OUTPATIENT
Start: 2018-04-24 | End: 2018-04-24 | Stop reason: HOSPADM

## 2018-04-24 RX ORDER — FENTANYL CITRATE 50 UG/ML
100 INJECTION, SOLUTION INTRAMUSCULAR; INTRAVENOUS ONCE
Status: DISCONTINUED | OUTPATIENT
Start: 2018-04-24 | End: 2018-04-24 | Stop reason: HOSPADM

## 2018-04-24 RX ORDER — OXYCODONE AND ACETAMINOPHEN 5; 325 MG/1; MG/1
1-2 TABLET ORAL
Qty: 40 TAB | Refills: 0 | Status: SHIPPED | OUTPATIENT
Start: 2018-04-24 | End: 2018-06-11

## 2018-04-24 RX ORDER — GLYCOPYRROLATE 0.2 MG/ML
INJECTION INTRAMUSCULAR; INTRAVENOUS AS NEEDED
Status: DISCONTINUED | OUTPATIENT
Start: 2018-04-24 | End: 2018-04-24 | Stop reason: HOSPADM

## 2018-04-24 RX ORDER — OXYCODONE AND ACETAMINOPHEN 5; 325 MG/1; MG/1
1 TABLET ORAL
COMMUNITY
End: 2018-06-11

## 2018-04-24 RX ORDER — CLINDAMYCIN PHOSPHATE 900 MG/50ML
900 INJECTION INTRAVENOUS ONCE
Status: DISCONTINUED | OUTPATIENT
Start: 2018-04-24 | End: 2018-04-24 | Stop reason: SDUPTHER

## 2018-04-24 RX ORDER — MIDAZOLAM HYDROCHLORIDE 1 MG/ML
2 INJECTION, SOLUTION INTRAMUSCULAR; INTRAVENOUS ONCE
Status: COMPLETED | OUTPATIENT
Start: 2018-04-24 | End: 2018-04-24

## 2018-04-24 RX ORDER — FENTANYL CITRATE 50 UG/ML
INJECTION, SOLUTION INTRAMUSCULAR; INTRAVENOUS AS NEEDED
Status: DISCONTINUED | OUTPATIENT
Start: 2018-04-24 | End: 2018-04-24 | Stop reason: HOSPADM

## 2018-04-24 RX ORDER — HYDROMORPHONE HYDROCHLORIDE 2 MG/ML
0.5 INJECTION, SOLUTION INTRAMUSCULAR; INTRAVENOUS; SUBCUTANEOUS
Status: COMPLETED | OUTPATIENT
Start: 2018-04-24 | End: 2018-04-24

## 2018-04-24 RX ORDER — LIDOCAINE HYDROCHLORIDE 20 MG/ML
INJECTION, SOLUTION EPIDURAL; INFILTRATION; INTRACAUDAL; PERINEURAL AS NEEDED
Status: DISCONTINUED | OUTPATIENT
Start: 2018-04-24 | End: 2018-04-24 | Stop reason: HOSPADM

## 2018-04-24 RX ORDER — NALOXONE HYDROCHLORIDE 0.4 MG/ML
0.04 INJECTION, SOLUTION INTRAMUSCULAR; INTRAVENOUS; SUBCUTANEOUS
Status: DISCONTINUED | OUTPATIENT
Start: 2018-04-24 | End: 2018-04-24 | Stop reason: HOSPADM

## 2018-04-24 RX ORDER — DEXAMETHASONE SODIUM PHOSPHATE 4 MG/ML
INJECTION, SOLUTION INTRA-ARTICULAR; INTRALESIONAL; INTRAMUSCULAR; INTRAVENOUS; SOFT TISSUE AS NEEDED
Status: DISCONTINUED | OUTPATIENT
Start: 2018-04-24 | End: 2018-04-24 | Stop reason: HOSPADM

## 2018-04-24 RX ORDER — PROPOFOL 10 MG/ML
INJECTION, EMULSION INTRAVENOUS AS NEEDED
Status: DISCONTINUED | OUTPATIENT
Start: 2018-04-24 | End: 2018-04-24 | Stop reason: HOSPADM

## 2018-04-24 RX ORDER — OXYCODONE HYDROCHLORIDE 5 MG/1
5 TABLET ORAL
Status: DISCONTINUED | OUTPATIENT
Start: 2018-04-24 | End: 2018-04-24 | Stop reason: HOSPADM

## 2018-04-24 RX ORDER — MIDAZOLAM HYDROCHLORIDE 1 MG/ML
2 INJECTION, SOLUTION INTRAMUSCULAR; INTRAVENOUS
Status: DISCONTINUED | OUTPATIENT
Start: 2018-04-24 | End: 2018-04-24 | Stop reason: HOSPADM

## 2018-04-24 RX ORDER — LIDOCAINE HYDROCHLORIDE 10 MG/ML
0.1 INJECTION INFILTRATION; PERINEURAL AS NEEDED
Status: DISCONTINUED | OUTPATIENT
Start: 2018-04-24 | End: 2018-04-24 | Stop reason: HOSPADM

## 2018-04-24 RX ADMIN — CLINDAMYCIN PHOSPHATE 900 MG: 150 INJECTION, SOLUTION, CONCENTRATE INTRAVENOUS at 12:11

## 2018-04-24 RX ADMIN — PROPOFOL 150 MG: 10 INJECTION, EMULSION INTRAVENOUS at 12:19

## 2018-04-24 RX ADMIN — SODIUM CHLORIDE, SODIUM LACTATE, POTASSIUM CHLORIDE, AND CALCIUM CHLORIDE 100 ML/HR: 600; 310; 30; 20 INJECTION, SOLUTION INTRAVENOUS at 11:55

## 2018-04-24 RX ADMIN — NEOSTIGMINE METHYLSULFATE 3 MG: 1 INJECTION INTRAVENOUS at 12:49

## 2018-04-24 RX ADMIN — MIDAZOLAM HYDROCHLORIDE 2 MG: 1 INJECTION, SOLUTION INTRAMUSCULAR; INTRAVENOUS at 11:56

## 2018-04-24 RX ADMIN — EPHEDRINE SULFATE 10 MG: 50 INJECTION, SOLUTION INTRAVENOUS at 12:27

## 2018-04-24 RX ADMIN — GLYCOPYRROLATE 0.4 MG: 0.2 INJECTION INTRAMUSCULAR; INTRAVENOUS at 12:49

## 2018-04-24 RX ADMIN — ROCURONIUM BROMIDE 30 MG: 10 INJECTION, SOLUTION INTRAVENOUS at 12:19

## 2018-04-24 RX ADMIN — HYDROMORPHONE HYDROCHLORIDE 0.5 MG: 2 INJECTION, SOLUTION INTRAMUSCULAR; INTRAVENOUS; SUBCUTANEOUS at 13:07

## 2018-04-24 RX ADMIN — FENTANYL CITRATE 50 MCG: 50 INJECTION, SOLUTION INTRAMUSCULAR; INTRAVENOUS at 12:17

## 2018-04-24 RX ADMIN — HYDROMORPHONE HYDROCHLORIDE 0.5 MG: 2 INJECTION, SOLUTION INTRAMUSCULAR; INTRAVENOUS; SUBCUTANEOUS at 13:17

## 2018-04-24 RX ADMIN — HYDROMORPHONE HYDROCHLORIDE 0.5 MG: 2 INJECTION, SOLUTION INTRAMUSCULAR; INTRAVENOUS; SUBCUTANEOUS at 13:33

## 2018-04-24 RX ADMIN — DEXAMETHASONE SODIUM PHOSPHATE 10 MG: 4 INJECTION, SOLUTION INTRA-ARTICULAR; INTRALESIONAL; INTRAMUSCULAR; INTRAVENOUS; SOFT TISSUE at 12:30

## 2018-04-24 RX ADMIN — FENTANYL CITRATE 50 MCG: 50 INJECTION, SOLUTION INTRAMUSCULAR; INTRAVENOUS at 12:32

## 2018-04-24 RX ADMIN — SODIUM CHLORIDE, SODIUM LACTATE, POTASSIUM CHLORIDE, AND CALCIUM CHLORIDE: 600; 310; 30; 20 INJECTION, SOLUTION INTRAVENOUS at 12:45

## 2018-04-24 RX ADMIN — KETOROLAC TROMETHAMINE 30 MG: 30 INJECTION, SOLUTION INTRAMUSCULAR; INTRAVENOUS at 12:47

## 2018-04-24 RX ADMIN — HYDROMORPHONE HYDROCHLORIDE 0.5 MG: 2 INJECTION, SOLUTION INTRAMUSCULAR; INTRAVENOUS; SUBCUTANEOUS at 00:05

## 2018-04-24 RX ADMIN — LIDOCAINE HYDROCHLORIDE 0.1 ML: 10 INJECTION, SOLUTION INFILTRATION; PERINEURAL at 11:56

## 2018-04-24 RX ADMIN — LIDOCAINE HYDROCHLORIDE 100 MG: 20 INJECTION, SOLUTION EPIDURAL; INFILTRATION; INTRACAUDAL; PERINEURAL at 12:19

## 2018-04-24 NOTE — IP AVS SNAPSHOT
[address] [place] [address] 
[phone] Patient: Asuncion Malave MRN: [id number] PD About your hospitalization You were admitted on:  2018 You last received care in the:  [facility] PACU You were discharged on:  2018 Why you were hospitalized Your primary diagnosis was:  Not on File Follow-up Information Follow up With Details Comments Contact Info Michel Miranda MD Schedule an appointment as soon as possible for a visit in 1 week(s)  [address] [address] 
[phone] Your Scheduled Appointments Thursday May 03, 2018  3:15 PM EDT Global Post Op with Michel Miranda MD  
[facility] ([address] [place] [id number] [phone] Discharge Orders None A check dominick indicates which time of day the medication should be taken. My Medications CHANGE how you take these medications Instructions Each Dose to Equal  
 Morning Noon Evening Bedtime  
 albuterol 90 mcg/actuation inhaler Commonly known as:  VENTOLIN HFA What changed:  additional instructions Your last dose was: Your next dose is: Take 2 Puffs by inhalation every four (4) hours as needed for Wheezing or Shortness of Breath (cough). 2 Puff DULoxetine 60 mg capsule Commonly known as:  CYMBALTA What changed:   
- when to take this 
- additional instructions Your last dose was: Your next dose is:    
   
   
 1 every day LORazepam 0.5 mg tablet Commonly known as:  ATIVAN What changed:  additional instructions Your last dose was: Your next dose is: Take 1 Tab by mouth daily as needed for Anxiety. 1 - 2 q6h prn severe anxiety 0.5 mg  
    
   
   
   
  
 pantoprazole 40 mg tablet Commonly known as:  PROTONIX What changed:  additional instructions Your last dose was: Your next dose is:    
   
   
 1 bid * PERCOCET 5-325 mg per tablet Generic drug:  oxyCODONE-acetaminophen What changed:  Another medication with the same name was added. Make sure you understand how and when to take each. Your last dose was: Your next dose is: Take 1 Tab by mouth every four (4) hours as needed for Pain. 1 Tab * oxyCODONE-acetaminophen 5-325 mg per tablet Commonly known as:  PERCOCET What changed: You were already taking a medication with the same name, and this prescription was added. Make sure you understand how and when to take each. Your last dose was: Your next dose is: Take 1-2 Tabs by mouth every four (4) hours as needed for Pain. Max Daily Amount: 12 Tabs. 1-2 Tab * Notice: This list has 2 medication(s) that are the same as other medications prescribed for you. Read the directions carefully, and ask your doctor or other care provider to review them with you. CONTINUE taking these medications Instructions Each Dose to Equal  
 Morning Noon Evening Bedtime CARAFATE PO Your last dose was: Your next dose is: Take  by mouth. Per anesthesia protocol:instructed to take am of surgery. fluticasone-salmeterol 250-50 mcg/dose diskus inhaler Commonly known as:  ADVAIR Your last dose was: Your next dose is: Take 1 Puff by inhalation two (2) times a day. 1 Puff HYDROcodone-acetaminophen 5-325 mg per tablet Commonly known as:  Tara Calderon Your last dose was: Your next dose is:     
   
   
 Take 1 Tab by mouth every six (6) hours as needed for Pain for up to 30 days. Max Daily Amount: 4 Tabs. Indications: Pain 1 Tab MIRENA 20 mcg/24 hr (5 years) Iud  
Generic drug:  levonorgestrel Your last dose was: Your next dose is:    
   
   
 1 Each by IntraUTERine route once. Indications: placed 12/17/15  
 1 Each NEBULIZER Your last dose was: Your next dose is:    
   
   
 by Does Not Apply route. promethazine 12.5 mg tablet Commonly known as:  PHENERGAN Your last dose was: Your next dose is:    
   
   
 1 to 2 tabs po [id number] as needed for nausea XOLAIR 150 mg Solr Generic drug:  omalizumab Your last dose was: Your next dose is:    
   
   
 by SubCUTAneous route once. Every 2 weeks. Indications: REFRACTORY EXTRINSIC ASTHMA  
     
   
   
   
  
 zolpidem 10 mg tablet Commonly known as:  AMBIEN Your last dose was: Your next dose is:    
   
   
 Half to 1 tab po qpm prn sleep Where to Get Your Medications Information on where to get these meds will be given to you by the nurse or doctor. ! Ask your nurse or doctor about these medications  
  oxyCODONE-acetaminophen 5-325 mg per tablet Opioid Education Prescription Opioids: What You Need to Know: 
 
Prescription opioids can be used to help relieve moderate-to-severe pain and are often prescribed following a surgery or injury, or for certain health conditions. These medications can be an important part of treatment but also come with serious risks. Opioids are strong pain medicines. Examples include hydrocodone, oxycodone, fentanyl, and morphine. Heroin is an example of an illegal opioid. It is important to work with your health care provider to make sure you are getting the safest, most effective care. WHAT ARE THE RISKS AND SIDE EFFECTS OF OPIOID USE? Prescription opioids carry serious risks of addiction and overdose, especially with prolonged use. An opioid overdose, often marked by slow breathing, can cause sudden death. The use of prescription opioids can have a number of side effects as well, even when taken as directed. · Tolerance-meaning you might need to take more of a medication for the same pain relief · Physical dependence-meaning you have symptoms of withdrawal when the medication is stopped. Withdrawal symptoms can include nausea, sweating, chills, diarrhea, stomach cramps, and muscle aches. Withdrawal can last up to several weeks, depending on which drug you took and how long you took it. · Increased sensitivity to pain · Constipation · Nausea, vomiting, and dry mouth · Sleepiness and dizziness · Confusion · Depression · Low levels of testosterone that can result in lower sex drive, energy, and strength · Itching and sweating RISKS ARE GREATER WITH:      
· History of drug misuse, substance use disorder, or overdose · Mental health conditions (such as depression or anxiety) · Sleep apnea · Older age (72 years or older) · Pregnancy Avoid alcohol while taking prescription opioids. Also, unless specifically advised by your health care provider, medications to avoid include: · Benzodiazepines (such as Xanax or Valium) · Muscle relaxants (such as Soma or Flexeril) · Hypnotics (such as Ambien or Lunesta) · Other prescription opioids KNOW YOUR OPTIONS Talk to your health care provider about ways to manage your pain that don't involve prescription opioids. Some of these options may actually work better and have fewer risks and side effects. Options may include: 
· Pain relievers such as acetaminophen, ibuprofen, and naproxen · Some medications that are also used for depression or seizures · Physical therapy and exercise · Counseling to help patients learn how to cope better with triggers of pain and stress. · Application of heat or cold compress · Massage therapy · Relaxation techniques Be Informed Make sure you know the name of your medication, how much and how often to take it, and its potential risks & side effects. IF YOU ARE PRESCRIBED OPIOIDS FOR PAIN: 
· Never take opioids in greater amounts or more often than prescribed. Remember the goal is not to be pain-free but to manage your pain at a tolerable level. · Follow up with your primary care provider to: · Work together to create a plan on how to manage your pain. · Talk about ways to help manage your pain that don't involve prescription opioids. · Talk about any and all concerns and side effects. · Help prevent misuse and abuse. · Never sell or share prescription opioids · Help prevent misuse and abuse. · Store prescription opioids in a secure place and out of reach of others (this may include visitors, children, friends, and family). · Safely dispose of unused/unwanted prescription opioids: Find your community drug take-back program or your pharmacy mail-back program, or flush them down the toilet, following guidance from the Food and Drug Administration (www.fda.gov/Drugs/ResourcesForYou). · Visit www.cdc.gov/drugoverdose to learn about the risks of opioid abuse and overdose. · If you believe you may be struggling with addiction, tell your health care provider and ask for guidance or call [address] [place], [place] [address]rose [facility] at [phone]. Discharge Instructions 1. Diet as tolerated except for a  low fat diet after laparoscopic cholecystectomy. 2. Showering is allowed, but no tub baths, hot tubs or swimming. 3. Drainage is common from the wounds. Change the dressings as needed. Call our office if the wounds become reddened, tender, feel warm to the touch or pus starts to drain from the wound. 4. Take prescribed pain medication as directed, usually Percocet, Norco, Ultram or Dilaudid. Take over the counter medication for minor pain. 5. Ice may be applied intermittently to the surgical site or sites. 6. Call or office, [phone], if problems arise. 7. Follow up in the office at the assigned time. 8. Resume all medications as taken per surgery, unless specifically instructed not to take certain ones. 9. No lifting more than 25 pounds until told otherwise. 10. Driving is allowed 3 days after surgery as long as you feel comfortable enough to drive and have not taken any prescription pain medication prior to driving. After general anesthesia or intravenous sedation, for 24 hours or while taking prescription Narcotics: · Limit your activities · Do not drive and operate hazardous machinery · Do not make important personal or business decisions · Do  not drink alcoholic beverages · If you have not urinated within 8 hours after discharge, please contact your surgeon on call. *  Please give a list of your current medications to your Primary Care Provider. *  Please update this list whenever your medications are discontinued, doses are 
    changed, or new medications (including over-the-counter products) are added. *  Please carry medication information at all times in case of emergency situations. These are general instructions for a healthy lifestyle: No smoking/ No tobacco products/ Avoid exposure to second hand smoke Surgeon General's Warning:  Quitting smoking now greatly reduces serious risk to your health. Obesity, smoking, and sedentary lifestyle greatly increases your risk for illness A healthy diet, regular physical exercise & weight monitoring are important for maintaining a healthy lifestyle You may be retaining fluid if you have a history of heart failure or if you experience any of the following symptoms:  Weight gain of 3 pounds or more overnight or 5 pounds in a week, increased swelling in our hands or feet or shortness of breath while lying flat in bed. Please call your doctor as soon as you notice any of these symptoms; do not wait until your next office visit. Recognize signs and symptoms of STROKE: 
F-face looks uneven A-arms unable to move or move unevenly S-speech slurred or non-existent T-time-call 911 as soon as signs and symptoms begin-DO NOT go Back to bed or wait to see if you get better-TIME IS BRAIN. Introducing [facility]! Dear [address] [place] [address]: Thank you for requesting a [employer] account. Our records indicate that you already have an active [employer] account. You can access your account anytime at [email or web address] Did you know that you can access your hospital and ER discharge instructions at any time in [employer]? You can also review all of your test results from your hospital stay or ER visit. Additional Information If you have questions, please visit the Frequently Asked Questions section of the [employer] website at [email or web address] Remember, [employer] is NOT to be used for urgent needs. For medical emergencies, dial 911. Now available from your iPhone and Android! Introducing Jose Domínguez As a [facility] patient, I wanted to make you aware of our electronic visit tool called Jose Domínguez. [facility] 24/7 allows you to connect within minutes with a medical provider 24 hours a day, seven days a week via a mobile device or tablet or logging into a secure website from your computer. You can access Jose Domínguez from anywhere in the [place].  
 
A virtual visit might be right for you when you have a simple condition and feel like you just dont want to get out of bed, or cant get away from work for an appointment, when your regular [facility] provider is not available (evenings, weekends or holidays), or when youre out of town and need minor care. Electronic visits cost only $49 and if the [address] [place] [address] 24/7 provider determines a prescription is needed to treat your condition, one can be electronically transmitted to a nearby pharmacy*. Please take a moment to enroll today if you have not already done so. The enrollment process is free and takes just a few minutes. To enroll, please download the [address] [place] [address] 24/7 estephania to your tablet or phone, or visit [email or web address] to enroll on your computer. And, as an [address] [place] [address] patient with a [employer] account, the results of your visits will be scanned into your electronic medical record and your primary care provider will be able to view the scanned results. We urge you to continue to see your regular [address] [place] [address] provider for your ongoing medical care. And while your primary care provider may not be the one available when you seek a [employer] virtual visit, the peace of mind you get from getting a real diagnosis real time can be priceless. For more information on [employer], view our Frequently Asked Questions (FAQs) at [email or web address]. Sincerely, 
 
Ciaran Duggan MD 
Chief Medical Officer [employer] *:  certain medications cannot be prescribed via [employer] Providers Seen During Your Hospitalization Provider Specialty Primary office phone Devendra Crawford, [address] [facility] [phone] Your Primary Care Physician (PCP) Primary Care Physician Office Phone Office Fax [address] [phone] You are allergic to the following Allergen Reactions Lactose Diarrhea Levaquin (Levofloxacin) Other (comments) Chest/back pain Penicillins Other (comments) UTI sx per pt Recent Documentation OB Status Smoking Status IUD Never Smoker Emergency Contacts Name Discharge Info Relation Home Work Mobile [facility] DIS[facility]AREG[facility] [3] Mother [14] [phone] Chaitanya aG  Father [15] [phone] Patient Belongings The following personal items are in your possession at time of discharge: 
  Dental Appliances: None, Retainer(s), Lowers  Visual Aid: None Please provide this summary of care documentation to your next provider. Signatures-by signing, you are acknowledging that this After Visit Summary has been reviewed with you and you have received a copy. Patient Signature:  ____________________________________________________________ Date:  ____________________________________________________________  
  
[facility] Provider Signature:  ____________________________________________________________ Date:  ____________________________________________________________

## 2018-04-24 NOTE — DISCHARGE INSTRUCTIONS
1. Diet as tolerated except for a  low fat diet after laparoscopic cholecystectomy. 2. Showering is allowed, but no tub baths, hot tubs or swimming. 3. Drainage is common from the wounds. Change the dressings as needed. Call our office if the wounds become reddened, tender, feel warm to the touch or pus starts to drain from the wound. 4. Take prescribed pain medication as directed, usually Percocet, Norco, Ultram or Dilaudid. Take over the counter medication for minor pain. 5. Ice may be applied intermittently to the surgical site or sites. 6. Call or office, [phone], if problems arise. 7. Follow up in the office at the assigned time. 8. Resume all medications as taken per surgery, unless specifically instructed not to take certain ones. 9. No lifting more than 25 pounds until told otherwise. 10. Driving is allowed 3 days after surgery as long as you feel comfortable enough to drive and have not taken any prescription pain medication prior to driving. After general anesthesia or intravenous sedation, for 24 hours or while taking prescription Narcotics:  · Limit your activities  · Do not drive and operate hazardous machinery  · Do not make important personal or business decisions  · Do  not drink alcoholic beverages  · If you have not urinated within 8 hours after discharge, please contact your surgeon on call. *  Please give a list of your current medications to your Primary Care Provider. *  Please update this list whenever your medications are discontinued, doses are      changed, or new medications (including over-the-counter products) are added. *  Please carry medication information at all times in case of emergency situations. These are general instructions for a healthy lifestyle:  No smoking/ No tobacco products/ Avoid exposure to second hand smoke  Surgeon General's Warning:  Quitting smoking now greatly reduces serious risk to your health.   Obesity, smoking, and sedentary lifestyle greatly increases your risk for illness  A healthy diet, regular physical exercise & weight monitoring are important for maintaining a healthy lifestyle    You may be retaining fluid if you have a history of heart failure or if you experience any of the following symptoms:  Weight gain of 3 pounds or more overnight or 5 pounds in a week, increased swelling in our hands or feet or shortness of breath while lying flat in bed. Please call your doctor as soon as you notice any of these symptoms; do not wait until your next office visit. Recognize signs and symptoms of STROKE:  F-face looks uneven  A-arms unable to move or move unevenly  S-speech slurred or non-existent  T-time-call 911 as soon as signs and symptoms begin-DO NOT go       Back to bed or wait to see if you get better-TIME IS BRAIN.

## 2018-04-24 NOTE — BRIEF OP NOTE
BRIEF OPERATIVE NOTE    Date of Procedure: 4/24/2018   Preoperative Diagnosis: Ventral hernia without obstruction or gangrene [K43.9]  Postoperative Diagnosis: Ventral hernia without obstruction or gangrene [K43.9]    Procedure(s):  LAPAROSCOPIC VENTRAL HERNIA REPAIR WITH MESH    Surgeon(s) and Role:     * Bill Andres MD - Primary         Surgical Assistant: None    Surgical Staff:  Circ-1: Harriet Magallon RN  Scrub Tech-1: Lakisha Fan  Event Time In   Incision Start 1231   Incision Close 1253     Anesthesia: General   Estimated Blood Loss: 5 cc's  Specimens: None  Findings: See dictated note   Complications: None  Implants:   Implant Name Type Inv.  Item Serial No.  Lot No. LRB No. Used Action   MESH RISHI CIR VENTRALEX LG 8CM --  - [id number]   MESH RISHI CIR VENTRALEX LG 8CM --  [id number] BARD LISSETH [id number] N/A 1 Implanted

## 2018-04-24 NOTE — OP NOTES
[address] [place] [address] REPORT    Brie Lewis  MR#: [id number]  : 1985  ACCOUNT #: [de-identified]   DATE OF SERVICE: 2018    PREOPERATIVE DIAGNOSIS:  Ventral hernia. POSTOPERATIVE DIAGNOSIS:  Ventral hernia containing omentum. PROCEDURE:  Laparoscopic ventral hernia repair with a large Ventralex mesh used. SURGEON:  Jarad Valdez MD    ANESTHESIA:  General endotracheal anesthesia with Dr. Karly Street. ESTIMATED BLOOD LOSS:  5 mL. SPECIMENS:  None. ASSISTANTS:  None. COMPLICATIONS:  None. IMPLANTS:  None. HISTORY:  This is a 42-year-old female who had a gastric bypass done in [place] many years ago. She works here at [employer] and came to my office with pain superior to her umbilicus. She had a difficult to reduce ventral hernia superior to her umbilicus. I went through a laparoscopic, possible open repair with the patient as she was having significant pain from this hernia site. I went through the risks of bleeding, infection, anesthesia, injury to the small bowel, large bowel, potential for recurrence of the hernia, hematoma, seroma formation. The patient was agreeable, signed consent form, was scheduled for today. DESCRIPTION OF PROCEDURE:  The patient was seen in the preop area. The hernia was marked in an area superior to her umbilicus with a marking pen. She was then transported to room #2 at [address] [place] [address].  She was placed on the operating room table in supine position. General endotracheal anesthesia was done by Dr. Karly Street and the staff. The patient had a Hays catheter inserted, which was removed at the end of procedure. The abdomen was prepped and draped in usual sterile manner. Timeout was done identifying the patient, surgeon, and procedure, her birthdate of 1985. Once everyone in the room agreed with the timeout, I began the procedure.   I made an incision in the left lower quadrant and through this placed an Optiview trocar with a 10 mm 0-degree scope. We went through all the appropriate layers of the anterior abdominal wall. Once in the abdominal cavity, we insufflated the abdomen to 15 mmHg. The 10 mm 30-degree scope was then inserted. She had a hernia superior to her umbilicus containing omentum. I placed two 5 mm trocars in the right lower quadrant, one at the level of the umbilicus on the right side of the abdomen and one in the right lower quadrant. The hernia sac contents were removed. The hernia sac was cut out and dropped down with the falciform ligament into the abdominal cavity. There was no significant bleeding or other problems. The hernia defect was then covered. We opened a large Ventralex piece of mesh. It was rolled up in a cigarette type fashion, placed through the 12 mm trocar in the left lower quadrant. It was unfurled in the abdominal cavity and tacked to the anterior abdominal wall musculature. The side toward the bowel was the PTFE side. The polypropylene side was up against the anterior abdominal wall to allow ingrowth of tissue into it. The mesh was tacked in position with the 5 mm ProTack device. Once this was done, the defect was completely covered. At this point, nothing further was required. We checked the area. We checked the two 5 mm trocar sites as the trocars were removed. The Optiview trocar was removed without evidence of bleeding. The patient had the wounds closed with subcuticular suture of 4-0 Monocryl. I injected 30 mL of 0.5% Marcaine in divided doses to the 3 incision sites. Hays catheter was removed. The patient tolerated the procedure well, was extubated and brought to recovery room in stable condition, will be sent home with a prescription for Percocet and follow up with me on 05/01/2018.       Mackie Najjar, MD       [address] [facility] [place]  D: 04/24/2018 13:05     T: 04/24/2018 15:16  JOB #: [id number]

## 2018-04-24 NOTE — H&P (VIEW-ONLY)
Date: 2018      Name: Britany Oquendo      MRN: [id number]       : 1985       Age: 34 y.o. Sex: female        Jena Calzada MD       CC:    Chief Complaint   Patient presents with    New Patient     incarcerated hernia       HPI:     Britany Oquendo is a 34 y.o. female who presents for evaluation of a ventral hernia as a referral from Elva Laureano NP. The hernia has been present for three weeks. She has noted a \"bulge\" in the area superior to her umbilicus for years, but has had pain at the site for 3 weeks. She has had nausea and vomiting periodically. She had a previous gastric bypass done in [place], [place] back in . Her pre-op weight was 265 lbs with a present weight of 168 lbs. The patient had an EGD done by Dr. Angy Freitas on 18 which showed a marginal ulcer on the jejunal side of her gastrojejunostomy anastamosis. The patient had a CT urogram done in 2017 that showed:  CT ABDOMEN: There is exclusion of the superior portions of the abdomen including  portions of the liver and spleen. The visualized portions of the liver and  spleen  are unremarkable on this noncontrast study. The pancreas and adrenal  glands are unremarkable on this noncontrast study.        There is a punctate nonobstructing calculus at the lower pole right kidney. There is no hydronephrosis. The patient status post cholecystectomy. There are  changes of gastric bypass surgery. No adenopathy or ascites is present. There is  a small supraumbilical hernia containing fat. There are no inflammatory changes.     CT PELVIS: An intrauterine device is present. No adenopathy or ascites is  present within the pelvis. There are no inflammatory changes. No calculi are  identified along the course of the  distal ureters.     IMPRESSION  IMPRESSION:  1. Punctate nonobstructing lower pole right renal calculus. 2. Small supraumbilical hernia containing fat.     PMH:    Past Medical History:   Diagnosis Date    Allergic rhinitis     Alopecia areata 2015    Dr Nguyen Knife: s/p steroid injection    Asthma dx age 25    last used rescue inhaler in March 2018- better now- denies SOB with 1 flight of steps    Asthma exacerbation 12/21/2015    Contraception management 1/21/2014    Depression     Encounter for insertion of mirena IUD 12/17/2015    GERD (gastroesophageal reflux disease)     controlled with protonix    Headache     LSIL (low grade squamous intraepithelial lesion) on Pap smear 7/7/2014    Nasal polyposis     PUD (peptic ulcer disease)     hx of GI ulcers since 2011    Recurrent cystitis     neg cx, renal ultrasound normal, s/p uro evla, deferred cysto, possible IC    SVT (supraventricular tachycardia) ([facility] [address] 12/4/2013    s/p echo (2013- normal); occasionally will have palpitations but denies any medications at this time    Symptomatic varicose veins of left lower extremity 3/28/2017    [facility]romboembolus [facility])     Ulcer 8/2012    revision of gastric bypass (excision of ulcer); managed with protonix BID    UTI (urinary tract infection)     Vaginitis     Varicose veins of legs        PSH:    Past Surgical History:   Procedure Laterality Date    ABDOMEN SURGERY PROC UNLISTED      ulcer removed    HX CHOLECYSTECTOMY  2011    HX GASTRIC BYPASS  08/2009     and 2012 was revision with excision ulcer    HX GYN      x2 vaginal birth    HX LAP CHOLECYSTECTOMY  2010    HX OTHER SURGICAL  2010,7/2016    scraped sinuses/ulcer surgery    HX TONSILLECTOMY  2008    HX WISDOM TEETH EXTRACTION      SINUS SURGERY [address] UNLISTED  2008, 2017    Deviated septum       MEDS:    Current Outpatient Prescriptions   Medication Sig    SUCRALFATE (CARAFATE PO) Take  by mouth.  LORazepam (ATIVAN) 0.5 mg tablet Take 1 Tab by mouth daily as needed for Anxiety. 1 - 2 q6h prn severe anxiety (Patient taking differently: Take 0.5 mg by mouth daily as needed for Anxiety.  1 - 2 q6h prn severe anxiety  Pt has not taken in 2 wks ( mid March 2018))    DULoxetine (CYMBALTA) 60 mg capsule 1 every day    pantoprazole (PROTONIX) 40 mg tablet 1 bid    zolpidem (AMBIEN) 10 mg tablet Half to 1 tab po qpm prn sleep    albuterol (VENTOLIN HFA) 90 mcg/actuation inhaler Take 2 Puffs by inhalation every four (4) hours as needed for Wheezing or Shortness of Breath (cough).  omalizumab (XOLAIR) 150 mg solr by SubCUTAneous route once. Every 2 weeks. Indications: REFRACTORY EXTRINSIC ASTHMA    levonorgestrel (MIRENA) 20 mcg/24 hr (5 years) IUD 1 Each by IntraUTERine route once. Indications: placed 12/17/15    NEBULIZER by Does Not Apply route.  HYDROcodone-acetaminophen (NORCO) 5-325 mg per tablet Take 1 Tab by mouth every six (6) hours as needed for Pain for up to 30 days. Max Daily Amount: 4 Tabs. Indications: Pain    fluticasone-salmeterol (ADVAIR) 250-50 mcg/dose diskus inhaler Take 1 Puff by inhalation two (2) times a day.  promethazine (PHENERGAN) 12.5 mg tablet 1 to 2 tabs po q6himbg as needed for nausea     No current facility-administered medications for this visit.         ALLERGIES:      Allergies   Allergen Reactions    Lactose Diarrhea    Levaquin [Levofloxacin] Other (comments)     Chest/back pain    Penicillins Other (comments)     UTI sx per pt       SH:    Social History   Substance Use Topics    Smoking status: Never Smoker    Smokeless tobacco: Never Used    Alcohol use No       FH:    Family History   Problem Relation Age of Onset    Thyroid Disease Mother      Graves Disease    Other Mother      varicose veins    No Known Problems Father     No Known Problems Brother     Cancer Maternal Grandfather      Lung    Thyroid Disease Maternal Grandfather     Emphysema Paternal Grandmother     Breast Cancer Maternal Aunt     Other Maternal Aunt      vv    Other Other      Ulcers (unknown family member)    Thyroid Disease Son     Lung Disease Son        ROS: The patient has no difficulty with chest pain or shortness of breath. No fever or chills. Comprehensive 13 point review of systems was otherwise unremarkable except as noted above. Physical Exam:     Visit Vitals    /74    Pulse 92    Ht 5' 1\" (1.549 m)    Wt 168 lb (76.2 kg)    BMI 31.74 kg/m2       General: Alert, oriented, cooperative white female in no acute distress. Eyes: Sclera are clear. Conjunctiva and lids within normal limits. No icterus. Ears and Nose: no gross deformities to visual inspection, gross hearing intact  Neck: Supple, trachea midline, no appreciable thyromegaly  Resp: Breathing is  non-labored. Lungs clear to auscultation without wheezing or rhonchi   CV: RRR. No murmurs, rubs or gallops appreciated. Abd: soft, tenderness superior to the umbilicus with a difficult to reduce ventral hernia. Psych:  Mood and affect appropriate. Short-term memory and understanding intact      Assessment/Plan:  Claudio Hebert is a 34 y.o. female who has signs and symptoms consistent with a difficult to reduce ventral hernia. 1. Laparoscopic, possible open, ventral hernia repair with mesh. I went over the risks of bleeding, infection, anesthesia, injury to the small bowel, large bowel, bladder, actually any of the intraabdominal organs as well as the potential need to convert to an open procedure. Another potential problem mentioned was the risk of recurrence of the hernia. I went over the use of mesh. I discussed the importance of following the post-op instructions, particularly the lifting restrictions. The patient understood the risks and wished to proceed. 2. Protonix/Carafate. F/U with GI for marginal ulcer.     Chadd Noland MD     [facility]   4/19/2018  3:49 PM

## 2018-04-24 NOTE — ANESTHESIA PREPROCEDURE EVALUATION
Anesthetic History               Review of Systems / Medical History  Patient summary reviewed and pertinent labs reviewed    Pulmonary            Asthma : well controlled       Neuro/Psych              Cardiovascular            Dysrhythmias (recently asymptomatic) : SVT      Exercise tolerance: >4 METS  Comments: Tends to run low BP 90/60s   GI/Hepatic/Renal     GERD: well controlled      PUD     Endo/Other        Anemia (Hgb 8.6)     Other Findings   Comments: Anemia from gastric ulcer, chronic           Physical Exam    Airway  Mallampati: I  TM Distance: 4 - 6 cm  Neck ROM: normal range of motion   Mouth opening: Normal     Cardiovascular    Rhythm: regular  Rate: normal         Dental  No notable dental hx       Pulmonary  Breath sounds clear to auscultation               Abdominal         Other Findings            Anesthetic Plan    ASA: 2  Anesthesia type: general          Induction: Intravenous  Anesthetic plan and risks discussed with: Patient

## 2018-04-24 NOTE — INTERVAL H&P NOTE
H&P Update:  Margarita Damian was seen and examined. History and physical has been reviewed. The patient has been examined.  There have been no significant clinical changes since the completion of the originally dated History and Physical.    Signed By: Ashlyn Senior MD     April 24, 2018 9:15 AM

## 2018-05-04 NOTE — ANESTHESIA POSTPROCEDURE EVALUATION
Post-Anesthesia Evaluation and Assessment    Patient: Asuncion Malave MRN: [id number]  SSN: [id number]    YOB: 1985  Age: 34 y.o. Sex: female       Cardiovascular Function/Vital Signs  Visit Vitals    BP 96/67 (BP 1 Location: Left arm, BP Patient Position: At rest)    Pulse 80    Temp 37.5 °C (99.5 °F)    Resp 16    SpO2 95%       Patient is status post general anesthesia for Procedure(s):  [address]  . Nausea/Vomiting: None    Postoperative hydration reviewed and adequate. Pain:  Pain Scale 1: Numeric (0 - 10) (04/24/18 1350)  Pain Intensity 1: 9 (04/24/18 1350)   Managed    Neurological Status:   Neuro (WDL): Within Defined Limits (04/24/18 1259)  Neuro  Neurologic State: Alert;Eyes open spontaneously (04/24/18 1350)  Orientation Level: Oriented X4 (04/24/18 1350)  Cognition: Appropriate for age attention/concentration; Follows commands (04/24/18 1350)  Speech: Clear (04/24/18 1350)   At baseline    Mental Status and Level of Consciousness: Alert and oriented     Pulmonary Status:   O2 Device: Room air (04/24/18 1350)   Adequate oxygenation and airway patent    Complications related to anesthesia: None    Post-anesthesia assessment completed.  No concerns    Signed By: Preston Herrera MD     May 4, 2018

## 2018-05-11 ENCOUNTER — HOSPITAL ENCOUNTER (OUTPATIENT)
Dept: CT IMAGING | Age: 33
Discharge: HOME | End: 2018-05-11
Attending: NURSE PRACTITIONER
Payer: COMMERCIAL

## 2018-05-11 DIAGNOSIS — R10.9 ABDOMINAL PAIN: ICD-10-CM

## 2018-05-11 PROCEDURE — 74011000258 HC RX REV CODE- 258: Performed by: NURSE PRACTITIONER

## 2018-05-11 PROCEDURE — 74177 CT ABD & PELVIS W/CONTRAST: CPT

## 2018-05-11 PROCEDURE — 74011636320 HC RX REV CODE- 636/320: Performed by: NURSE PRACTITIONER

## 2018-05-11 RX ORDER — SODIUM CHLORIDE 0.9 % (FLUSH) 0.9 %
10 SYRINGE (ML) INJECTION
Status: COMPLETED | OUTPATIENT
Start: 2018-05-11 | End: 2018-05-11

## 2018-05-11 RX ADMIN — IOPAMIDOL 100 ML: 755 INJECTION, SOLUTION INTRAVENOUS at 14:27

## 2018-05-11 RX ADMIN — Medication 10 ML: at 14:27

## 2018-05-11 RX ADMIN — SODIUM CHLORIDE 100 ML: 900 INJECTION, SOLUTION INTRAVENOUS at 14:27

## 2018-06-15 ENCOUNTER — HOSPITAL ENCOUNTER (OUTPATIENT)
Dept: LAB | Age: 33
Discharge: HOME | End: 2018-06-15
Payer: COMMERCIAL

## 2018-06-15 DIAGNOSIS — R10.9 ABDOMINAL PAIN, UNSPECIFIED ABDOMINAL LOCATION: ICD-10-CM

## 2018-06-15 LAB
BASOPHILS # BLD: 0.1 K/UL (ref 0–0.2)
BASOPHILS NFR BLD: 1 % (ref 0–2)
DIFFERENTIAL METHOD BLD: ABNORMAL
EOSINOPHIL # BLD: 0.6 K/UL (ref 0–0.8)
EOSINOPHIL NFR BLD: 7 % (ref 0.5–7.8)
ERYTHROCYTE [DISTWIDTH] IN BLOOD BY AUTOMATED COUNT: 20.1 % (ref 11.9–14.6)
HCT VFR BLD AUTO: 35.2 % (ref 35.8–46.3)
HGB BLD-MCNC: 10.6 G/DL (ref 11.7–15.4)
IMM GRANULOCYTES # BLD: 0 K/UL (ref 0–0.5)
IMM GRANULOCYTES NFR BLD AUTO: 0 % (ref 0–5)
LYMPHOCYTES # BLD: 2 K/UL (ref 0.5–4.6)
LYMPHOCYTES NFR BLD: 26 % (ref 13–44)
MCH RBC QN AUTO: 22.2 PG (ref 26.1–32.9)
MCHC RBC AUTO-ENTMCNC: 30.1 G/DL (ref 31.4–35)
MCV RBC AUTO: 73.6 FL (ref 79.6–97.8)
MONOCYTES # BLD: 0.5 K/UL (ref 0.1–1.3)
MONOCYTES NFR BLD: 7 % (ref 4–12)
NEUTS SEG # BLD: 4.5 K/UL (ref 1.7–8.2)
NEUTS SEG NFR BLD: 59 % (ref 43–78)
PLATELET # BLD AUTO: 385 K/UL (ref 150–450)
PMV BLD AUTO: 10.1 FL (ref 10.8–14.1)
RBC # BLD AUTO: 4.78 M/UL (ref 4.05–5.25)
WBC # BLD AUTO: 7.7 K/UL (ref 4.3–11.1)

## 2018-06-15 PROCEDURE — 85025 COMPLETE CBC W/AUTO DIFF WBC: CPT | Performed by: FAMILY MEDICINE

## 2018-07-31 ENCOUNTER — HOSPITAL ENCOUNTER (OUTPATIENT)
Dept: ULTRASOUND IMAGING | Age: 33
Discharge: HOME | End: 2018-07-31
Attending: FAMILY MEDICINE
Payer: COMMERCIAL

## 2018-07-31 DIAGNOSIS — R10.30 LOWER ABDOMINAL PAIN: ICD-10-CM

## 2018-07-31 PROCEDURE — 76830 TRANSVAGINAL US NON-OB: CPT

## 2018-10-19 ENCOUNTER — APPOINTMENT (OUTPATIENT)
Dept: ULTRASOUND IMAGING | Age: 33
End: 2018-10-19
Payer: COMMERCIAL

## 2018-10-19 ENCOUNTER — HOSPITAL ENCOUNTER (EMERGENCY)
Age: 33
Discharge: HOME | End: 2018-10-19
Attending: EMERGENCY MEDICINE
Payer: COMMERCIAL

## 2018-10-19 VITALS
RESPIRATION RATE: 17 BRPM | DIASTOLIC BLOOD PRESSURE: 75 MMHG | HEART RATE: 99 BPM | SYSTOLIC BLOOD PRESSURE: 138 MMHG | HEIGHT: 61 IN | WEIGHT: 164 LBS | OXYGEN SATURATION: 99 % | BODY MASS INDEX: 30.96 KG/M2 | TEMPERATURE: 98 F

## 2018-10-19 DIAGNOSIS — N39.0 URINARY TRACT INFECTION WITHOUT HEMATURIA, SITE UNSPECIFIED: ICD-10-CM

## 2018-10-19 DIAGNOSIS — Z3A.01 LESS THAN 8 WEEKS GESTATION OF PREGNANCY: ICD-10-CM

## 2018-10-19 DIAGNOSIS — D64.9 ANEMIA, UNSPECIFIED TYPE: Primary | ICD-10-CM

## 2018-10-19 LAB
ALBUMIN SERPL-MCNC: 3.4 G/DL (ref 3.5–5)
ALBUMIN/GLOB SERPL: 0.9 {RATIO}
ALP SERPL-CCNC: 63 U/L (ref 50–130)
ALT SERPL-CCNC: 17 U/L (ref 12–65)
ANION GAP SERPL CALC-SCNC: 10 MMOL/L
AST SERPL-CCNC: 20 U/L (ref 15–37)
BACTERIA URNS QL MICRO: ABNORMAL /HPF
BASOPHILS # BLD: 0.1 K/UL (ref 0–0.2)
BASOPHILS NFR BLD: 1 % (ref 0–2)
BILIRUB SERPL-MCNC: 0.2 MG/DL (ref 0.2–1.1)
BUN SERPL-MCNC: 14 MG/DL (ref 6–23)
CALCIUM SERPL-MCNC: 8.5 MG/DL (ref 8.3–10.4)
CASTS URNS QL MICRO: 0 /LPF
CHLORIDE SERPL-SCNC: 109 MMOL/L (ref 98–107)
CO2 SERPL-SCNC: 22 MMOL/L (ref 21–32)
CREAT SERPL-MCNC: 0.92 MG/DL (ref 0.6–1)
CRYSTALS URNS QL MICRO: 0 /LPF
DIFFERENTIAL METHOD BLD: ABNORMAL
EOSINOPHIL # BLD: 1 K/UL (ref 0–0.8)
EOSINOPHIL NFR BLD: 12 % (ref 0.5–7.8)
EPI CELLS #/AREA URNS HPF: ABNORMAL /HPF
ERYTHROCYTE [DISTWIDTH] IN BLOOD BY AUTOMATED COUNT: 18.3 %
GLOBULIN SER CALC-MCNC: 3.8 G/DL (ref 2.3–3.5)
GLUCOSE SERPL-MCNC: 86 MG/DL (ref 65–100)
HCG SERPL-ACNC: 7531 MIU/ML (ref 0–6)
HCG UR QL: POSITIVE
HCT VFR BLD AUTO: 32.5 % (ref 35.8–46.3)
HGB BLD-MCNC: 9.9 G/DL (ref 11.7–15.4)
IMM GRANULOCYTES # BLD: 0 K/UL (ref 0–0.5)
IMM GRANULOCYTES NFR BLD AUTO: 0 % (ref 0–5)
LYMPHOCYTES # BLD: 2.3 K/UL (ref 0.5–4.6)
LYMPHOCYTES NFR BLD: 29 % (ref 13–44)
MCH RBC QN AUTO: 23.7 PG (ref 26.1–32.9)
MCHC RBC AUTO-ENTMCNC: 30.5 G/DL (ref 31.4–35)
MCV RBC AUTO: 77.8 FL (ref 79.6–97.8)
MONOCYTES # BLD: 0.7 K/UL (ref 0.1–1.3)
MONOCYTES NFR BLD: 9 % (ref 4–12)
MUCOUS THREADS URNS QL MICRO: ABNORMAL /LPF
NEUTS SEG # BLD: 3.8 K/UL (ref 1.7–8.2)
NEUTS SEG NFR BLD: 48 % (ref 43–78)
NRBC # BLD: 0 K/UL (ref 0–0.2)
PLATELET # BLD AUTO: 360 K/UL (ref 150–450)
PMV BLD AUTO: 10 FL (ref 9.4–12.3)
POTASSIUM SERPL-SCNC: 4 MMOL/L (ref 3.5–5.1)
PROT SERPL-MCNC: 7.2 G/DL
RBC # BLD AUTO: 4.18 M/UL (ref 4.05–5.2)
RBC #/AREA URNS HPF: 0 /HPF
SERVICE CMNT-IMP: NORMAL
SODIUM SERPL-SCNC: 141 MMOL/L (ref 136–145)
WBC # BLD AUTO: 7.8 K/UL (ref 4.3–11.1)
WBC URNS QL MICRO: ABNORMAL /HPF
WET PREP GENITAL: NORMAL

## 2018-10-19 PROCEDURE — 87491 CHLMYD TRACH DNA AMP PROBE: CPT

## 2018-10-19 PROCEDURE — 96374 THER/PROPH/DIAG INJ IV PUSH: CPT | Performed by: PHYSICIAN ASSISTANT

## 2018-10-19 PROCEDURE — 87210 SMEAR WET MOUNT SALINE/INK: CPT

## 2018-10-19 PROCEDURE — 74011250636 HC RX REV CODE- 250/636: Performed by: PHYSICIAN ASSISTANT

## 2018-10-19 PROCEDURE — 87086 URINE CULTURE/COLONY COUNT: CPT

## 2018-10-19 PROCEDURE — 85025 COMPLETE CBC W/AUTO DIFF WBC: CPT

## 2018-10-19 PROCEDURE — 99285 EMERGENCY DEPT VISIT HI MDM: CPT | Performed by: PHYSICIAN ASSISTANT

## 2018-10-19 PROCEDURE — 84702 CHORIONIC GONADOTROPIN TEST: CPT

## 2018-10-19 PROCEDURE — 81015 MICROSCOPIC EXAM OF URINE: CPT

## 2018-10-19 PROCEDURE — 80053 COMPREHEN METABOLIC PANEL: CPT

## 2018-10-19 PROCEDURE — 76817 TRANSVAGINAL US OBSTETRIC: CPT

## 2018-10-19 PROCEDURE — 74011000258 HC RX REV CODE- 258: Performed by: PHYSICIAN ASSISTANT

## 2018-10-19 PROCEDURE — 81025 URINE PREGNANCY TEST: CPT

## 2018-10-19 RX ORDER — CEPHALEXIN 500 MG/1
500 CAPSULE ORAL 4 TIMES DAILY
Qty: 40 CAP | Refills: 0 | Status: SHIPPED | OUTPATIENT
Start: 2018-10-19 | End: 2018-10-29

## 2018-10-19 RX ORDER — ONDANSETRON 4 MG/1
2 TABLET, FILM COATED ORAL
Qty: 10 TAB | Refills: 0 | Status: SHIPPED | OUTPATIENT
Start: 2018-10-19 | End: 2018-12-10

## 2018-10-19 RX ORDER — ONDANSETRON 2 MG/ML
4 INJECTION INTRAMUSCULAR; INTRAVENOUS
Status: COMPLETED | OUTPATIENT
Start: 2018-10-19 | End: 2018-10-19

## 2018-10-19 RX ORDER — FOLIC ACID/MULTIVIT,IRON,MINER 0.4MG-18MG
1 TABLET ORAL DAILY
Qty: 30 TAB | Refills: 0 | Status: SHIPPED | OUTPATIENT
Start: 2018-10-19 | End: 2019-04-22

## 2018-10-19 RX ADMIN — CEFTRIAXONE 1 G: 1 INJECTION, POWDER, FOR SOLUTION INTRAMUSCULAR; INTRAVENOUS at 15:13

## 2018-10-19 RX ADMIN — ONDANSETRON 4 MG: 2 INJECTION INTRAMUSCULAR; INTRAVENOUS at 15:10

## 2018-10-19 NOTE — ED NOTES
I have reviewed discharge instructions with the patient. The patient verbalized understanding. Patient left ED via Discharge Method: ambulatory to Home with boyfriend. Opportunity for questions and clarification provided. Patient given 3 scripts. To continue your aftercare when you leave the hospital, you may receive an automated call from our care team to check in on how you are doing. This is a free service and part of our promise to provide the best care and service to meet your aftercare needs.  If you have questions, or wish to unsubscribe from this service please call [phone]. Thank you for Choosing our [facility] Emergency Department.

## 2018-10-19 NOTE — LETTER
[facility] EMERGENCY DEPT 
[address] [place] [id number] 
[phone] Work/School Note Date: 10/19/2018 To Whom It May concern: 
 
Rufus Barajas was seen and treated today in the emergency room by the following provider(s): 
Attending Provider: Esther Pugh MD 
Physician Assistant: CLAIRE Meek. Rufus Barajas may return to work on 10/21/18. Sincerely, CLAIRE Batres

## 2018-10-19 NOTE — ED PROVIDER NOTES
Patient is a . She had a Mirena IUD for 5 years, it was removed in September and she found out she was pregnant on Monday, 4 days ago. She was placed on birth control one week before the IUD was removed and has been taking them as directed. This was not a planned pregnancy. She is blood type B positive. She is having pelvic pain, mild low back pain, cramping, light bleeding that started today and nausea but no vomiting. She cannot tell me her last menstrual period as she states they were so irregular with the Mirena. She is not having any chest pain, shortness of breath, other abdominal pain, dizziness, weakness, swelling of her arms or legs, dyspnea on exertion, orthopnea or other new symptoms. She was ambulatory to the room without difficulty and well-hydrated. The history is provided by the patient. Vaginal Bleeding This is a new problem. The current episode started 6 to 12 hours ago. The problem occurs constantly. The problem has not changed since onset. Pertinent negatives include no chest pain, no abdominal pain, no headaches and no shortness of breath. Nothing aggravates the symptoms. Nothing relieves the symptoms. She has tried nothing for the symptoms. Past Medical History:  
Diagnosis Date  Allergic rhinitis  Alopecia areata  Dr Antonella Young: s/p steroid injection  Anemia, iron deficiency  Asthma dx age 25  
 last used rescue inhaler in 2018- better now- denies SOB with 1 flight of steps  Asthma exacerbation 2018  Depression  Encounter for insertion of mirena IUD 2015  GERD (gastroesophageal reflux disease)   
 controlled with protonix  Headache  LSIL (low grade squamous intraepithelial lesion) on Pap smear 2014  Nasal polyposis  PUD (peptic ulcer disease)   
 hx of GI ulcers since   Recurrent cystitis   
 neg cx, renal ultrasound normal, s/p uro evla, deferred cysto, possible IC  
  SVT (supraventricular tachycardia) ([facility] [address] 12/04/2013  
 s/p echo (2013- normal); occasionally will have palpitations but denies any medications at this time  Symptomatic varicose veins of left lower extremity 3/28/2017  Thromboembolus ([facility] [address]   
 s/p infiltrated IV in arm  Ulcer 8/2012  
 revision of gastric bypass (excision of ulcer); managed with protonix BID  Vaginitis  Varicose veins of legs Past Surgical History:  
Procedure Laterality Date  ABDOMEN SURGERY PROC UNLISTED    
 ulcer removed  ABDOMEN SURGERY PROC UNLISTED  04/24/2018 lap ventral hernia  HX CHOLECYSTECTOMY  2011  HX GASTRIC BYPASS  08/2009  
  and 2012 was revision with excision ulcer  HX GYN    
 x2 vaginal birth  HX HERNIA REPAIR    
 HX LAP CHOLECYSTECTOMY  2010  HX OTHER SURGICAL  2010,7/2016  
 scraped sinuses/ulcer surgery  HX TONSILLECTOMY  2008  HX WISDOM TEETH EXTRACTION    
Owen Charm SINUS SURGERY PROC UNLISTED  2008, 2017 Deviated septum Family History:  
Problem Relation Age of Onset  Thyroid Disease Mother Graves Disease  Other Mother   
     varicose veins  No Known Problems Father  No Known Problems Brother  Cancer Maternal Grandfather Lung  Thyroid Disease Maternal Grandfather  Emphysema Paternal Grandmother  Breast Cancer Maternal Aunt  Other Maternal Aunt   
     vv  
 Other Other Ulcers (unknown family member)  Thyroid Disease Son  Lung Disease Son   
 
 
Social History Socioeconomic History  Marital status: SINGLE Spouse name: Not on file  Number of children: Not on file  Years of education: Not on file  Highest education level: Not on file Social Needs  Financial resource strain: Not on file  Food insecurity - worry: Not on file  Food insecurity - inability: Not on file  Transportation needs - medical: Not on file  Transportation needs - non-medical: Not on file Occupational History  Not on file Tobacco Use  Smoking status: Never Smoker  Smokeless tobacco: Never Used Substance and Sexual Activity  Alcohol use: No  
 Drug use: No  
 Sexual activity: Yes  
  Partners: Male Birth control/protection: IUD Other Topics Concern  Not on file Social History Narrative Has 2 children [id number] [facility] ALLERGIES: Lactose; Levaquin [levofloxacin]; and Penicillins Review of Systems Constitutional: Negative. HENT: Negative. Eyes: Negative. Respiratory: Negative. Negative for shortness of breath. Cardiovascular: Negative. Negative for chest pain. Gastrointestinal: Positive for nausea. Negative for abdominal pain. Genitourinary: Positive for pelvic pain and vaginal bleeding. Negative for decreased urine volume, difficulty urinating, dyspareunia, dysuria, enuresis, flank pain, frequency, genital sores, hematuria, menstrual problem, urgency, vaginal discharge and vaginal pain. Musculoskeletal: Negative. Skin: Negative. Neurological: Negative. Negative for headaches. Psychiatric/Behavioral: Negative. All other systems reviewed and are negative. Vitals:  
 10/19/18 1344 BP: 114/77 Pulse: 82 Resp: 16 Temp: 97.8 °F (36.6 °C) SpO2: 100% Weight: 74.4 kg (164 lb) Height: 5' 1\" (1.549 m) Physical Exam  
Constitutional: She is oriented to person, place, and time. She appears well-developed and well-nourished. Non-toxic appearance. She does not appear ill. No distress. HENT:  
Head: Normocephalic and atraumatic. Right Ear: Hearing, tympanic membrane and ear canal normal.  
Left Ear: Hearing, tympanic membrane and ear canal normal.  
Mouth/Throat: Uvula is midline, oropharynx is clear and moist and mucous membranes are normal. Tonsils are 0 on the right. Tonsils are 0 on the left. No tonsillar exudate. Eyes: EOM are normal. Pupils are equal, round, and reactive to light. Neck: Normal range of motion. Neck supple. Cardiovascular: Normal rate, regular rhythm, normal heart sounds and intact distal pulses. Pulmonary/Chest: Effort normal and breath sounds normal.  
Abdominal: Soft. Bowel sounds are normal. Hernia confirmed negative in the right inguinal area and confirmed negative in the left inguinal area. Genitourinary: There is no tenderness or lesion on the right labia. There is no tenderness or lesion on the left labia. Cervix exhibits motion tenderness. Cervix exhibits no discharge and no friability. Right adnexum displays tenderness. Right adnexum displays no mass and no fullness. Left adnexum displays no mass, no tenderness and no fullness. There is bleeding (Very scant, light bleeding) in the vagina. Genitourinary Comments: Evens Alford, RN into assist with pelvic exam. Lymphadenopathy: No inguinal adenopathy noted on the right or left side. Neurological: She is alert and oriented to person, place, and time. Skin: Skin is warm and dry. Capillary refill takes less than 2 seconds. Psychiatric: She has a normal mood and affect. Her behavior is normal.  
  
 
MDM Number of Diagnoses or Management Options Amount and/or Complexity of Data Reviewed Clinical lab tests: ordered and reviewed Tests in the radiology section of CPT®: ordered and reviewed Risk of Complications, Morbidity, and/or Mortality Presenting problems: moderate Diagnostic procedures: moderate Management options: moderate Procedures 2:32 PM Spoke with Dr. Jason Walton regarding patient. 3:08 PM Patient informed of UTI. She states that she was diagnosed with a urinary tract infection 2 weeks ago and took some Macrobid. She was told it was Escherichia coli. She is having nausea as well. 4:50 PM [facility] OB paged. Patient also requesting zofran to go home with for nausea.  5:26 PM Spoke with Dr. Jabier Baptiste, we discussed the patient's history, physical exam, lab work, ultrasound and need for follow-up. She would like a repeat quantitative hCG in 48 hours and states since that is Sunday she should come back to the emergency room. She should call the office on Monday for a follow-up appointment and return sooner with increasing pain, bleeding or cramping. She should take the antibiotics for the UTI and use the prenatal multivitamins with the iron for the anemia which is long-standing, per patient. Patient is ambulating around the ER without difficulty and ready for discharge. The patient was observed in the ED. Results Reviewed: 
 
 
Recent Results (from the past 24 hour(s)) CBC WITH AUTOMATED DIFF Collection Time: 10/19/18  2:01 PM  
Result Value Ref Range WBC 7.8 4.3 - 11.1 K/uL  
 RBC 4.18 4.05 - 5.2 M/uL HGB 9.9 (L) 11.7 - 15.4 g/dL HCT 32.5 (L) 35.8 - 46.3 % MCV 77.8 (L) 79.6 - 97.8 FL  
 MCH 23.7 (L) 26.1 - 32.9 PG  
 MCHC 30.5 (L) 31.4 - 35.0 g/dL  
 RDW 18.3 % PLATELET 908 661 - 495 K/uL MPV 10.0 9.4 - 12.3 FL ABSOLUTE NRBC 0.00 0.0 - 0.2 K/uL  
 DF AUTOMATED NEUTROPHILS 48 43 - 78 % LYMPHOCYTES 29 13 - 44 % MONOCYTES 9 4.0 - 12.0 % EOSINOPHILS 12 (H) 0.5 - 7.8 % BASOPHILS 1 0.0 - 2.0 % IMMATURE GRANULOCYTES 0 0.0 - 5.0 %  
 ABS. NEUTROPHILS 3.8 1.7 - 8.2 K/UL  
 ABS. LYMPHOCYTES 2.3 0.5 - 4.6 K/UL  
 ABS. MONOCYTES 0.7 0.1 - 1.3 K/UL  
 ABS. EOSINOPHILS 1.0 (H) 0.0 - 0.8 K/UL  
 ABS. BASOPHILS 0.1 0.0 - 0.2 K/UL  
 ABS. IMM. GRANS. 0.0 0.0 - 0.5 K/UL METABOLIC PANEL, COMPREHENSIVE Collection Time: 10/19/18  2:01 PM  
Result Value Ref Range Sodium 141 136 - 145 mmol/L Potassium 4.0 3.5 - 5.1 mmol/L Chloride 109 (H) 98 - 107 mmol/L  
 CO2 22 21 - 32 mmol/L Anion gap 10 mmol/L Glucose 86 65 - 100 mg/dL BUN 14 6 - 23 MG/DL Creatinine 0.92 0.6 - 1.0 MG/DL  
 GFR est AA >60 >60 ml/min/1.73m2 GFR est non-AA >60 ml/min/1.73m2 Calcium 8.5 8.3 - 10.4 MG/DL  Bilirubin, total 0.2 0.2 - 1.1 MG/DL  
 ALT (SGPT) 17 12 - 65 U/L  
 AST (SGOT) 20 15 - 37 U/L Alk. phosphatase 63 50 - 130 U/L Protein, total 7.2 g/dL Albumin 3.4 (L) 3.5 - 5.0 g/dL Globulin 3.8 (H) 2.3 - 3.5 g/dL A-G Ratio 0.9 BETA HCG, QT Collection Time: 10/19/18  2:01 PM  
Result Value Ref Range Beta HCG, QT 7,531 (H) 0.0 - 6.0 MIU/ML  
HCG URINE, QL. - POC Collection Time: 10/19/18  2:04 PM  
Result Value Ref Range Pregnancy test,urine (POC) POSITIVE (A) NEG URINE MICROSCOPIC Collection Time: 10/19/18  2:07 PM  
Result Value Ref Range WBC 5-10 0 /hpf  
 RBC 0 0 /hpf Epithelial cells 5-10 0 /hpf Bacteria 3+ (H) 0 /hpf Casts 0 0 /lpf Crystals, urine 0 0 /LPF Mucus TRACE 0 /lpf WET PREP Collection Time: 10/19/18  2:21 PM  
Result Value Ref Range Special Requests: NO SPECIAL REQUESTS Wet prep WBC'S 
0 TO 5 PER HPF Wet prep RARE 
CLUE CELLS PRESENT Wet prep NO TRICHOMONAS SEEN Wet prep NO YEAST SEEN    
 
US PREG UTS LTD Final Result IMPRESSION: Small gestational sac with yolk sac but no fetal pole or cardiac  
activity. There is fluid in the endometrium versus a subchorionic hemorrhage. I discussed the results of all labs, procedures, radiographs, and treatments with the patient and available family. Treatment plan is agreed upon and the patient is ready for discharge. All voiced understanding of the discharge plan and medication instructions or changes as appropriate. Questions about treatment in the ED were answered. All were encouraged to return should symptoms worsen or new problems develop.

## 2018-10-19 NOTE — ED TRIAGE NOTES
Pt recently found out she is pregnant and is having vaginal bleeding. Pt states low back and lower abdominal cramping but no worse than it was 3 weeks ago. Has not had first US or OB appt.

## 2018-10-19 NOTE — DISCHARGE INSTRUCTIONS
Urinary Tract Infection in Pregnancy: Care Instructions  Your Care Instructions    A urinary tract infection, or UTI, is an infection of the bladder and other urinary structures. Most UTIs occur in the bladder. They often cause pain or burning when you urinate. UTI is the most common bacterial infection in pregnancy. If untreated, a UTI could lead to problems such as a kidney infection or  labor. Most UTIs can be cured with antibiotics. Your doctor will prescribe an antibiotic that is safe during pregnancy. Be sure to finish your medicine so that the infection does not spread to your kidneys. Follow-up care is a key part of your treatment and safety. Be sure to make and go to all appointments, and call your doctor if you are having problems. It's also a good idea to know your test results and keep a list of the medicines you take. How can you care for yourself at home? · Take your antibiotics as directed. Do not stop taking them just because you feel better. You need to take the full course of antibiotics. · Drink extra water and other fluids for the next day or two. This will help wash out the bacteria causing the infection. If you have kidney, heart, or liver disease and have to limit fluids, talk with your doctor before you increase the amount of fluids you drink. · Do not drink alcohol. · Urinate often. Try to empty your bladder each time. Preventing UTIs  · Drink plenty of fluids, enough so that your urine is light yellow or clear like water. This helps you urinate often, which clears bacteria from your system. If you have kidney, heart, or liver disease and have to limit fluids, talk with your doctor before you increase the amount of fluids you drink. · Urinate when you first have the urge. · Urinate right after you have sex. This is the best way for women to avoid UTIs. · When going to the bathroom, wipe from front to back to keep bacteria from entering the vagina or urethra.   When should you call for help? Call your doctor now or seek immediate medical care if:    · You have symptoms of a worse urinary tract infection. These may include:  ? Pain or burning when you urinate. ? A frequent need to urinate without being able to pass much urine. ? Pain in the flank, which is just below the rib cage and above the waist on either side of the back. ? Blood in your urine. ? A fever.    Watch closely for changes in your health, and be sure to contact your doctor if:    · You do not get better as expected. Where can you learn more? Go to http://amelia-loni.info/. Enter M982 in the search box to learn more about \"Urinary Tract Infection in Pregnancy: Care Instructions. \"  Current as of: November 21, 2017  Content Version: 11.8  © 7898-0832 [employer]. Care instructions adapted under license by [employer] (which disclaims liability or warranty for this information). If you have questions about a medical condition or this instruction, always ask your healthcare professional. [place] [address] any warranty or liability for your use of this information. Anemia: Care Instructions  Your Care Instructions    Anemia is a low level of red blood cells, which carry oxygen throughout your body. Many things can cause anemia. Lack of iron is one of the most common causes. Your body needs iron to make hemoglobin, a substance in red blood cells that carries oxygen from the lungs to your body's cells. Without enough iron, the body produces fewer and smaller red blood cells. As a result, your body's cells do not get enough oxygen, and you feel tired and weak. And you may have trouble concentrating. Bleeding is the most common cause of a lack of iron. You may have heavy menstrual bleeding or bleeding caused by conditions such as ulcers, hemorrhoids, or cancer.  Regular use of aspirin or other anti-inflammatory medicines (such as ibuprofen) also can cause bleeding in some people. A lack of iron in your diet also can cause anemia, especially at times when the body needs more iron, such as during pregnancy, infancy, and the teen years. Your doctor may have prescribed iron pills. It may take several months of treatment for your iron levels to return to normal. Your doctor also may suggest that you eat foods that are rich in iron, such as meat and beans. There are many other causes of anemia. It is not always due to a lack of iron. Finding the specific cause of your anemia will help your doctor find the right treatment for you. Follow-up care is a key part of your treatment and safety. Be sure to make and go to all appointments, and call your doctor if you are having problems. It's also a good idea to know your test results and keep a list of the medicines you take. How can you care for yourself at home? · Take your medicines exactly as prescribed. Call your doctor if you think you are having a problem with your medicine. · If your doctor recommends iron pills, take them as directed:  ? Try to take the pills on an empty stomach about 1 hour before or 2 hours after meals. But you may need to take iron with food to avoid an upset stomach. ? Do not take antacids or drink milk or caffeine drinks (such as coffee, tea, or cola) at the same time or within 2 hours of the time that you take your iron. They can make it hard for your body to absorb the iron. ? Vitamin C (from food or supplements) helps your body absorb iron. Try taking iron pills with a glass of orange juice or some other food that is high in vitamin C, such as citrus fruits. ? Iron pills may cause stomach problems, such as heartburn, nausea, diarrhea, constipation, and cramps. Be sure to drink plenty of fluids, and include fruits, vegetables, and fiber in your diet each day. Iron pills often make your bowel movements dark or green.   ? If you forget to take an iron pill, do not take a double dose of iron the next time you take a pill. ? Keep iron pills out of the reach of small children. An overdose of iron can be very dangerous. · Follow your doctor's advice about eating iron-rich foods. These include red meat, shellfish, poultry, eggs, beans, raisins, whole-grain bread, and leafy green vegetables. · Steam vegetables to help them keep their iron content. When should you call for help? Call 911 anytime you think you may need emergency care. For example, call if:    · You have symptoms of a heart attack. These may include:  ? Chest pain or pressure, or a strange feeling in the chest.  ? Sweating. ? Shortness of breath. ? Nausea or vomiting. ? Pain, pressure, or a strange feeling in the back, neck, jaw, or upper belly or in one or both shoulders or arms. ? Lightheadedness or sudden weakness. ? A fast or irregular heartbeat. After you call 911, the  may tell you to chew 1 adult-strength or 2 to 4 low-dose aspirin. Wait for an ambulance. Do not try to drive yourself.     · You passed out (lost consciousness).    Call your doctor now or seek immediate medical care if:    · You have new or increased shortness of breath.     · You are dizzy or lightheaded, or you feel like you may faint.     · Your fatigue and weakness continue or get worse.     · You have any abnormal bleeding, such as:  ? Nosebleeds. ? Vaginal bleeding that is different (heavier, more frequent, at a different time of the month) than what you are used to.  ? Bloody or black stools, or rectal bleeding. ? Bloody or pink urine.    Watch closely for changes in your health, and be sure to contact your doctor if:    · You do not get better as expected. Where can you learn more? Go to http://amelia-loni.info/. Enter R301 in the search box to learn more about \"Anemia: Care Instructions. \"  Current as of: May 7, 2018  Content Version: 11.8  © 1236-1925 [employer].  Care instructions adapted under license by [employer] (which disclaims liability or warranty for this information). If you have questions about a medical condition or this instruction, always ask your healthcare professional. [address] any warranty or liability for your use of this information.

## 2018-10-21 ENCOUNTER — HOSPITAL ENCOUNTER (OUTPATIENT)
Dept: LAB | Age: 33
Discharge: HOME | End: 2018-10-21
Payer: COMMERCIAL

## 2018-10-21 LAB — HCG SERPL-ACNC: ABNORMAL MIU/ML (ref 0–6)

## 2018-10-21 PROCEDURE — 84702 CHORIONIC GONADOTROPIN TEST: CPT

## 2018-10-21 PROCEDURE — 36415 COLL VENOUS BLD VENIPUNCTURE: CPT

## 2018-10-22 LAB
BACTERIA SPEC CULT: NORMAL
C TRACH RRNA SPEC QL NAA+PROBE: NEGATIVE
N GONORRHOEA RRNA SPEC QL NAA+PROBE: NEGATIVE
SERVICE CMNT-IMP: NORMAL
SPECIMEN SOURCE: NORMAL

## 2018-10-26 PROBLEM — Z98.84 BARIATRIC SURGERY STATUS: Status: ACTIVE | Noted: 2018-10-26

## 2018-11-01 ENCOUNTER — HOSPITAL ENCOUNTER (EMERGENCY)
Age: 33
Discharge: HOME | End: 2018-11-01
Attending: EMERGENCY MEDICINE
Payer: COMMERCIAL

## 2018-11-01 ENCOUNTER — APPOINTMENT (OUTPATIENT)
Dept: ULTRASOUND IMAGING | Age: 33
End: 2018-11-01
Attending: EMERGENCY MEDICINE
Payer: COMMERCIAL

## 2018-11-01 VITALS
DIASTOLIC BLOOD PRESSURE: 67 MMHG | BODY MASS INDEX: 31.15 KG/M2 | HEIGHT: 61 IN | HEART RATE: 89 BPM | OXYGEN SATURATION: 99 % | SYSTOLIC BLOOD PRESSURE: 124 MMHG | WEIGHT: 165 LBS | TEMPERATURE: 98 F | RESPIRATION RATE: 16 BRPM

## 2018-11-01 DIAGNOSIS — R10.9 ACUTE RIGHT FLANK PAIN: Primary | ICD-10-CM

## 2018-11-01 DIAGNOSIS — O20.0 THREATENED MISCARRIAGE: ICD-10-CM

## 2018-11-01 LAB
BASOPHILS # BLD: 0.1 K/UL (ref 0–0.2)
BASOPHILS NFR BLD: 1 % (ref 0–2)
DIFFERENTIAL METHOD BLD: ABNORMAL
EOSINOPHIL # BLD: 1 K/UL (ref 0–0.8)
EOSINOPHIL NFR BLD: 11 % (ref 0.5–7.8)
ERYTHROCYTE [DISTWIDTH] IN BLOOD BY AUTOMATED COUNT: 19.1 %
HCG SERPL-ACNC: ABNORMAL MIU/ML (ref 0–6)
HCT VFR BLD AUTO: 31.7 % (ref 35.8–46.3)
HGB BLD-MCNC: 9.7 G/DL (ref 11.7–15.4)
IMM GRANULOCYTES # BLD: 0 K/UL (ref 0–0.5)
IMM GRANULOCYTES NFR BLD AUTO: 0 % (ref 0–5)
LYMPHOCYTES # BLD: 3.3 K/UL (ref 0.5–4.6)
LYMPHOCYTES NFR BLD: 36 % (ref 13–44)
MCH RBC QN AUTO: 24.2 PG (ref 26.1–32.9)
MCHC RBC AUTO-ENTMCNC: 30.6 G/DL (ref 31.4–35)
MCV RBC AUTO: 79.1 FL (ref 79.6–97.8)
MONOCYTES # BLD: 0.5 K/UL (ref 0.1–1.3)
MONOCYTES NFR BLD: 6 % (ref 4–12)
NEUTS SEG # BLD: 4.3 K/UL (ref 1.7–8.2)
NEUTS SEG NFR BLD: 47 % (ref 43–78)
NRBC # BLD: 0 K/UL (ref 0–0.2)
PLATELET # BLD AUTO: 389 K/UL (ref 150–450)
PMV BLD AUTO: 9.5 FL (ref 9.4–12.3)
RBC # BLD AUTO: 4.01 M/UL (ref 4.05–5.2)
WBC # BLD AUTO: 9.3 K/UL (ref 4.3–11.1)

## 2018-11-01 PROCEDURE — 81003 URINALYSIS AUTO W/O SCOPE: CPT | Performed by: EMERGENCY MEDICINE

## 2018-11-01 PROCEDURE — 85025 COMPLETE CBC W/AUTO DIFF WBC: CPT

## 2018-11-01 PROCEDURE — 96376 TX/PRO/DX INJ SAME DRUG ADON: CPT | Performed by: EMERGENCY MEDICINE

## 2018-11-01 PROCEDURE — 84702 CHORIONIC GONADOTROPIN TEST: CPT

## 2018-11-01 PROCEDURE — 74011250637 HC RX REV CODE- 250/637: Performed by: EMERGENCY MEDICINE

## 2018-11-01 PROCEDURE — 76770 US EXAM ABDO BACK WALL COMP: CPT

## 2018-11-01 PROCEDURE — 74011250636 HC RX REV CODE- 250/636: Performed by: EMERGENCY MEDICINE

## 2018-11-01 PROCEDURE — 99283 EMERGENCY DEPT VISIT LOW MDM: CPT | Performed by: EMERGENCY MEDICINE

## 2018-11-01 PROCEDURE — 76815 OB US LIMITED FETUS(S): CPT

## 2018-11-01 PROCEDURE — 96374 THER/PROPH/DIAG INJ IV PUSH: CPT | Performed by: EMERGENCY MEDICINE

## 2018-11-01 RX ORDER — HYDROCODONE BITARTRATE AND ACETAMINOPHEN 5; 325 MG/1; MG/1
1 TABLET ORAL
Qty: 10 TAB | Refills: 0 | Status: SHIPPED | OUTPATIENT
Start: 2018-11-01 | End: 2018-12-10

## 2018-11-01 RX ORDER — SODIUM CHLORIDE 0.9 % (FLUSH) 0.9 %
5-10 SYRINGE (ML) INJECTION EVERY 8 HOURS
Status: DISCONTINUED | OUTPATIENT
Start: 2018-11-01 | End: 2018-11-02 | Stop reason: HOSPADM

## 2018-11-01 RX ORDER — HYDROMORPHONE HYDROCHLORIDE 2 MG/ML
0.5 INJECTION, SOLUTION INTRAMUSCULAR; INTRAVENOUS; SUBCUTANEOUS
Status: COMPLETED | OUTPATIENT
Start: 2018-11-01 | End: 2018-11-01

## 2018-11-01 RX ORDER — ACETAMINOPHEN 325 MG/1
650 TABLET ORAL
Status: COMPLETED | OUTPATIENT
Start: 2018-11-01 | End: 2018-11-01

## 2018-11-01 RX ORDER — SODIUM CHLORIDE 0.9 % (FLUSH) 0.9 %
5-10 SYRINGE (ML) INJECTION AS NEEDED
Status: DISCONTINUED | OUTPATIENT
Start: 2018-11-01 | End: 2018-11-02 | Stop reason: HOSPADM

## 2018-11-01 RX ADMIN — ACETAMINOPHEN 650 MG: 325 TABLET, FILM COATED ORAL at 19:27

## 2018-11-01 RX ADMIN — HYDROMORPHONE HYDROCHLORIDE 0.5 MG: 2 INJECTION, SOLUTION INTRAMUSCULAR; INTRAVENOUS; SUBCUTANEOUS at 21:05

## 2018-11-01 RX ADMIN — HYDROMORPHONE HYDROCHLORIDE 0.5 MG: 2 INJECTION, SOLUTION INTRAMUSCULAR; INTRAVENOUS; SUBCUTANEOUS at 19:27

## 2018-11-01 NOTE — ED NOTES
Patient advises that she is 7 weeks pregnant with 3rd pregnancy and 2 live births. Patient advises that she has some vaginal bleeding today with lower right sided back pain. Patient advises it was a \"gush\" of blood and continues to have some bleeding. Patient with some vaginal bleeding 2 weeks ago however had resolved until today. Patient with [address] on 10-19 advises subchorionic hemorrhage present.

## 2018-11-01 NOTE — ED PROVIDER NOTES
72-year-old female  states she is about 7 weeks' pregnant with one-day history of right flank pain and hematuria. She's had some vaginal bleeding that that has been going on for about a week. Nausea but no vomiting. No fever or dysuria. All minimal to no anterior abdominal pain. The patient has a history kidney stones and UTIs. This third pregnancy. Seen here about a week ago for vaginal bleeding and had subchorionic hemorrhage and is followed by her OB/GYN doctor for that. No particular increase of the bleeding at this point The history is provided by the patient. Pregnancy Problem Pertinent negatives include no fever, no diarrhea, no vomiting, no dysuria, no headaches, no chest pain and no back pain. Flank Pain This is a new problem. The current episode started 6 to 12 hours ago. The problem has not changed since onset. The problem occurs constantly. Patient reports not work related injury. The pain is associated with no known injury. The pain is present in the right side, middle back and lower back. The quality of the pain is described as aching and dull. The pain does not radiate. The pain is moderate. Pertinent negatives include no chest pain, no fever, no headaches, no abdominal pain, no bladder incontinence, no dysuria, no paresthesias and no weakness. She has tried nothing for the symptoms. Risk factors include history of kidney stones. Past Medical History:  
Diagnosis Date  Allergic rhinitis  Alopecia areata  Dr Reddy Prom: s/p steroid injection  Anemia, iron deficiency  Asthma dx age 25  
 last used rescue inhaler in 2018- better now- denies SOB with 1 flight of steps  Asthma exacerbation 2018  Depression  Encounter for insertion of mirena IUD 2015  GERD (gastroesophageal reflux disease)   
 controlled with protonix  Headache  LSIL (low grade squamous intraepithelial lesion) on Pap smear 2014  Nasal polyposis  PUD (peptic ulcer disease)   
 hx of GI ulcers since 2011  Recurrent cystitis   
 neg cx, renal ultrasound normal, s/p uro evla, deferred cysto, possible IC  
 SVT (supraventricular tachycardia) ([address] 12/04/2013  
 s/p echo (2013- normal); occasionally will have palpitations but denies any medications at this time  Symptomatic varicose veins of left lower extremity 3/28/2017  Thromboembolus ([address]   
 s/p infiltrated IV in arm  Ulcer 8/2012  
 revision of gastric bypass (excision of ulcer); managed with protonix BID  Vaginitis  Varicose veins of legs Past Surgical History:  
Procedure Laterality Date  ABDOMEN SURGERY PROC UNLISTED    
 ulcer removed  ABDOMEN SURGERY PROC UNLISTED  04/24/2018 lap ventral hernia  HX CHOLECYSTECTOMY  2011  HX GASTRIC BYPASS  08/2009  
  and 2012 was revision with excision ulcer  HX GYN    
 x2 vaginal birth  HX HERNIA REPAIR    
 HX LAP CHOLECYSTECTOMY  2010  HX OTHER SURGICAL  2010,7/2016  
 scraped sinuses/ulcer surgery  HX TONSILLECTOMY  2008  HX WISDOM TEETH EXTRACTION    
[place] SINUS SURGERY PROC UNLISTED  2008, 2017 Deviated septum Family History:  
Problem Relation Age of Onset  Thyroid Disease Mother Graves Disease  Other Mother   
     varicose veins  No Known Problems Father  No Known Problems Brother  Cancer Maternal Grandfather Lung  Thyroid Disease Maternal Grandfather  Emphysema Paternal Grandmother  Breast Cancer Maternal Aunt  Other Maternal Aunt   
     vv  
 Other Other Ulcers (unknown family member)  Thyroid Disease Son  Lung Disease Son   
 
 
Social History Socioeconomic History  Marital status: SINGLE Spouse name: Not on file  Number of children: Not on file  Years of education: Not on file  Highest education level: Not on file Social Needs  Financial resource strain: Not on file  Food insecurity - worry: Not on file  Food insecurity - inability: Not on file  Transportation needs - medical: Not on file  Transportation needs - non-medical: Not on file Occupational History  Not on file Tobacco Use  Smoking status: Never Smoker  Smokeless tobacco: Never Used Substance and Sexual Activity  Alcohol use: No  
 Drug use: No  
 Sexual activity: Yes  
  Partners: Male Birth control/protection: IUD Other Topics Concern  Not on file Social History Narrative Has 2 children [facility] ALLERGIES: Lactose; Levaquin [levofloxacin]; and Penicillins Review of Systems Constitutional: Negative for chills and fever. HENT: Negative for ear pain. Respiratory: Negative for cough and shortness of breath. Cardiovascular: Negative for chest pain and palpitations. Gastrointestinal: Negative for abdominal pain, diarrhea and vomiting. Genitourinary: Positive for flank pain. Negative for bladder incontinence and dysuria. Musculoskeletal: Negative for back pain and neck pain. Skin: Negative for color change and rash. Neurological: Negative for syncope, weakness, headaches and paresthesias. All other systems reviewed and are negative. Vitals:  
 11/01/18 1821 BP: 126/75 Pulse: 97 Resp: 16 Temp: 98.3 °F (36.8 °C) SpO2: 99% Weight: 74.8 kg (165 lb) Height: 5' 1\" (1.549 m) Physical Exam  
Constitutional: She is oriented to person, place, and time. She appears well-developed and well-nourished. No distress. HENT:  
Head: Normocephalic and atraumatic. Right Ear: External ear normal.  
Left Ear: External ear normal.  
Mouth/Throat: Oropharynx is clear and moist.  
Eyes: Conjunctivae and EOM are normal. Pupils are equal, round, and reactive to light. Neck: Normal range of motion. Neck supple. Cardiovascular: Normal rate, regular rhythm and intact distal pulses. No murmur heard. Pulmonary/Chest: Breath sounds normal. No respiratory distress. Abdominal: Soft. Bowel sounds are normal. She exhibits no mass. There is no tenderness. There is CVA tenderness. There is no rebound and no guarding. No hernia. Neurological: She is alert and oriented to person, place, and time. Gait normal.  
Nl speech Skin: Skin is warm and dry. Psychiatric: She has a normal mood and affect. Her speech is normal.  
Nursing note and vitals reviewed. MDM Number of Diagnoses or Management Options Diagnosis management comments: Assessment kidney stones or UTI. Check pelvic ultrasound but no particular new symptoms with regard to pregnancy. Amount and/or Complexity of Data Reviewed Clinical lab tests: ordered and reviewed Tests in the radiology section of CPT®: ordered and reviewed Review and summarize past medical records: yes (Recent EGD and OB/GYN visits for threatened miscarriage with subchorionic hemorrhage. Patient's is Rh+.) Risk of Complications, Morbidity, and/or Mortality Presenting problems: moderate Diagnostic procedures: low Management options: moderate Patient Progress Patient progress: stable Procedures Results Include: 
 
Recent Results (from the past 24 hour(s)) CBC WITH AUTOMATED DIFF Collection Time: 11/01/18  6:33 PM  
Result Value Ref Range WBC 9.3 4.3 - 11.1 K/uL  
 RBC 4.01 (L) 4.05 - 5.2 M/uL HGB 9.7 (L) 11.7 - 15.4 g/dL HCT 31.7 (L) 35.8 - 46.3 % MCV 79.1 (L) 79.6 - 97.8 FL  
 MCH 24.2 (L) 26.1 - 32.9 PG  
 MCHC 30.6 (L) 31.4 - 35.0 g/dL  
 RDW 19.1 % PLATELET 063 979 - 439 K/uL MPV 9.5 9.4 - 12.3 FL ABSOLUTE NRBC 0.00 0.0 - 0.2 K/uL  
 DF AUTOMATED NEUTROPHILS 47 43 - 78 % LYMPHOCYTES 36 13 - 44 % MONOCYTES 6 4.0 - 12.0 % EOSINOPHILS 11 (H) 0.5 - 7.8 % BASOPHILS 1 0.0 - 2.0 % IMMATURE GRANULOCYTES 0 0.0 - 5.0 %  
 ABS. NEUTROPHILS 4.3 1.7 - 8.2 K/UL  
 ABS. LYMPHOCYTES 3.3 0.5 - 4.6 K/UL ABS. MONOCYTES 0.5 0.1 - 1.3 K/UL  
 ABS. EOSINOPHILS 1.0 (H) 0.0 - 0.8 K/UL  
 ABS. BASOPHILS 0.1 0.0 - 0.2 K/UL  
 ABS. IMM. GRANS. 0.0 0.0 - 0.5 K/UL  
BETA HCG, QT Collection Time: 11/01/18  6:33 PM  
Result Value Ref Range Beta HCG, QT 52,602 (H) 0.0 - 6.0 MIU/ML  
 
 
 
 Us Retroperitoneum Comp Result Date: 11/1/2018 ULTRASOUND OF THE KIDNEYS AND BLADDER CLINICAL HISTORY:  79-year-old with in the first trimester of pregnancy presents with one-day history of right flank pain and hematuria. History of stone disease. COMPARISON:  CT of May 11, 2018. FINDINGS:  Multiple images from real time ultrasound evaluation of the kidneys show that they are normal in size, orientation, and echogenicity bilaterally. The right kidney measures 8.5 cm in length while the left measures 9.5 cm. No definite solid renal mass, hydronephrosis, stone, or abnormal perinephric collection is seen. The bladder appears unremarkable as imaged. Aorta and IVC are unremarkable as imaged. IMPRESSION:  Unremarkable examination. Us Retroperitoneum Comp Result Date: 11/1/2018 ULTRASOUND OF THE KIDNEYS AND BLADDER CLINICAL HISTORY:  79-year-old with in the first trimester of pregnancy presents with one-day history of right flank pain and hematuria. History of stone disease. COMPARISON:  CT of May 11, 2018. FINDINGS:  Multiple images from real time ultrasound evaluation of the kidneys show that they are normal in size, orientation, and echogenicity bilaterally. The right kidney measures 8.5 cm in length while the left measures 9.5 cm. No definite solid renal mass, hydronephrosis, stone, or abnormal perinephric collection is seen. The bladder appears unremarkable as imaged. Aorta and IVC are unremarkable as imaged. IMPRESSION:  Unremarkable examination. [address] Result Date: 11/1/2018 TRANSABDOMINAL AND TRANSVAGINAL PELVIC SONOGRAPHY: CLINICAL HISTORY: 29-year-old  3, para 2, abortus 1 presents with positive pregnancy test and right flank pain and history of subchorionic hemorrhage. Quantitative hCG is 52,602 compared with 7,531 on 2018. COMPARISON:  2018. FINDINGS:  Transabdominal and transvaginal images show that the uterus measures 10.2 x 5.3 x 7.1 cm. Single intrauterine gestation is seen with positive fetal heart motion identified. Crown-rump length corresponds to an approximate menstrual age of 7 weeks, 1 days +/- 3 days today. There is a persistent 1.9 cm complex structure adjacent to the gestational sac, likely representing a subchorionic bleed. The right ovary measures 3.4 x 1.5 x 3.2 cm and is normal in contour and echogenicity. The left ovary measures 4.4 x 2.4 x 2.7 cm in association with a 1.8 cm complex cyst likely representing a corpus luteum. No abnormal fluid collection is seen. IMPRESSION:  1. Single intrauterine gestation with positive fetal heart motion approximately 7 weeks, 1 days today. 2.  Persistent small subchorionic hemorrhage. Discussed with urology regarding follow-up.

## 2018-11-02 ENCOUNTER — PATIENT OUTREACH (OUTPATIENT)
Dept: OTHER | Age: 33
End: 2018-11-02

## 2018-11-02 NOTE — PROGRESS NOTES
Patient on [facility] discharge report dated 11/2/18. Left message on voicemail. Will attempt to contact again. Need to complete post-discharge assessment.

## 2018-11-02 NOTE — ED NOTES
I have reviewed discharge instructions with the patient. The patient verbalized understanding. Patient left ED via Discharge Method: ambulatory to Home with self and significant other. Opportunity for questions and clarification provided. Patient given 1 scripts. To continue your aftercare when you leave the hospital, you may receive an automated call from our care team to check in on how you are doing. This is a free service and part of our promise to provide the best care and service to meet your aftercare needs.  If you have questions, or wish to unsubscribe from this service please call [phone]. Thank you for Choosing our [facility] [employer] [facility] Emergency Department.

## 2018-11-02 NOTE — DISCHARGE INSTRUCTIONS
Rest.  Extra fluids. Tylenol or prescription pain medication if needed for pain. Call your urologist Monday to recheck if not improving. Recheck with your OB/GYN doctor as well. Especially recheck with him if he have worse bleeding or stomach pain.

## 2018-11-07 ENCOUNTER — PATIENT OUTREACH (OUTPATIENT)
Dept: OTHER | Age: 33
End: 2018-11-07

## 2018-11-07 PROBLEM — I47.1 SVT (SUPRAVENTRICULAR TACHYCARDIA) (HCC): Status: ACTIVE | Noted: 2018-11-07

## 2018-11-07 PROBLEM — F41.9 ANXIETY AND DEPRESSION: Status: ACTIVE | Noted: 2018-11-07

## 2018-11-07 PROBLEM — F32.A ANXIETY AND DEPRESSION: Status: ACTIVE | Noted: 2018-11-07

## 2018-11-07 PROBLEM — O46.8X1 SUBCHORIONIC HEMORRHAGE IN FIRST TRIMESTER: Status: ACTIVE | Noted: 2018-11-07

## 2018-11-07 PROBLEM — Z87.42 HISTORY OF ABNORMAL CERVICAL PAP SMEAR: Status: ACTIVE | Noted: 2018-11-07

## 2018-11-07 PROBLEM — Z86.718 HISTORY OF BLOOD CLOTS: Status: ACTIVE | Noted: 2018-11-07

## 2018-11-07 PROBLEM — O41.8X10 SUBCHORIONIC HEMORRHAGE IN FIRST TRIMESTER: Status: ACTIVE | Noted: 2018-11-07

## 2018-11-07 PROBLEM — Z34.90 PREGNANCY: Status: ACTIVE | Noted: 2018-11-07

## 2018-11-07 NOTE — LETTER
Ms. Paul Montoya [address] [place] [address] Dear Ms. Radha Talbot, My name is Sherrilee Closs, RN, Employee Care Manager for [address] [place] [address], and I have been trying to reach you. The Employee Care  is a free-of-charge, confidential service provided to our employees and their family members covered by the [employer]. Part of my job is to follow up with members who have recently been in the hospital or emergency room, to help them coordinate their care and answer questions they may have about their visit. I am able to provide assistance with medication questions, scheduling needed follow-up appointments, and arranging services like home health or home medical equipment. I can also provide education regarding your hospital or ER visit as well as your medical conditions. As healthcare providers, we know that patients do better when they have close follow up with a primary care provider (PCP), especially after a hospital or emergency department visit. If you do not have a PCP, I can help you find one that is convenient to you and covered by your insurance. I can also help you understand any after visit instructions, such as what symptoms to watch out for, or any new or changed medications. Employee Care Management now partners with [employer]. If you are a qualifying employee, you may receive an additional 10 wellness incentive points for every month of active participation with an Employee Care Manager. Remember that you can access your After Visit Summary by logging into your [employer] account. If you do not have a [employer] account, I can help you request access. Our program is designed to provide you with the opportunity to have a [address] [place] [address] care manager partner with you for your healthcare needs. Please contact me at the below number if I can provide you with assistance for any of the above services.  
 
Sincerely, 
 
 Elva Chin, RN, BSN  Employee Care Manager [email or web address]

## 2018-11-07 NOTE — PROGRESS NOTES
Second attempt to reach patient for [facility], and discharge assessment. Discreet VM left. Will send UTR letter.

## 2018-11-12 PROBLEM — O99.019 ANEMIA IN PREGNANCY: Status: ACTIVE | Noted: 2018-11-12

## 2018-12-10 ENCOUNTER — HOSPITAL ENCOUNTER (EMERGENCY)
Age: 33
Discharge: HOME | End: 2018-12-10
Attending: EMERGENCY MEDICINE
Payer: COMMERCIAL

## 2018-12-10 VITALS
SYSTOLIC BLOOD PRESSURE: 107 MMHG | HEART RATE: 89 BPM | OXYGEN SATURATION: 100 % | HEIGHT: 62 IN | DIASTOLIC BLOOD PRESSURE: 64 MMHG | RESPIRATION RATE: 18 BRPM | BODY MASS INDEX: 32.02 KG/M2 | TEMPERATURE: 98 F | WEIGHT: 174 LBS

## 2018-12-10 DIAGNOSIS — G43.809 OTHER MIGRAINE WITHOUT STATUS MIGRAINOSUS, NOT INTRACTABLE: Primary | ICD-10-CM

## 2018-12-10 DIAGNOSIS — O46.90 VAGINAL BLEEDING DURING PREGNANCY: ICD-10-CM

## 2018-12-10 LAB
ANION GAP SERPL CALC-SCNC: 10 MMOL/L
BASOPHILS # BLD: 0.1 K/UL (ref 0–0.2)
BASOPHILS NFR BLD: 1 % (ref 0–2)
BUN SERPL-MCNC: 9 MG/DL (ref 6–23)
CALCIUM SERPL-MCNC: 8.9 MG/DL (ref 8.3–10.4)
CHLORIDE SERPL-SCNC: 107 MMOL/L (ref 98–107)
CO2 SERPL-SCNC: 21 MMOL/L (ref 21–32)
CREAT SERPL-MCNC: 0.55 MG/DL (ref 0.6–1)
DIFFERENTIAL METHOD BLD: ABNORMAL
EOSINOPHIL # BLD: 0.3 K/UL (ref 0–0.8)
EOSINOPHIL NFR BLD: 4 % (ref 0.5–7.8)
ERYTHROCYTE [DISTWIDTH] IN BLOOD BY AUTOMATED COUNT: 16.9 % (ref 11.9–14.6)
GLUCOSE SERPL-MCNC: 82 MG/DL (ref 65–100)
HCT VFR BLD AUTO: 30.4 % (ref 35.8–46.3)
HGB BLD-MCNC: 9.3 G/DL (ref 11.7–15.4)
IMM GRANULOCYTES # BLD: 0 K/UL (ref 0–0.5)
IMM GRANULOCYTES NFR BLD AUTO: 0 % (ref 0–5)
LYMPHOCYTES # BLD: 1.3 K/UL (ref 0.5–4.6)
LYMPHOCYTES NFR BLD: 17 % (ref 13–44)
MCH RBC QN AUTO: 24.3 PG (ref 26.1–32.9)
MCHC RBC AUTO-ENTMCNC: 30.6 G/DL (ref 31.4–35)
MCV RBC AUTO: 79.6 FL (ref 79.6–97.8)
MONOCYTES # BLD: 0.4 K/UL (ref 0.1–1.3)
MONOCYTES NFR BLD: 5 % (ref 4–12)
NEUTS SEG # BLD: 5.6 K/UL (ref 1.7–8.2)
NEUTS SEG NFR BLD: 73 % (ref 43–78)
NRBC # BLD: 0 K/UL (ref 0–0.2)
PLATELET # BLD AUTO: 305 K/UL (ref 150–450)
PMV BLD AUTO: 10 FL (ref 9.4–12.3)
POTASSIUM SERPL-SCNC: 3.9 MMOL/L (ref 3.5–5.1)
RBC # BLD AUTO: 3.82 M/UL (ref 4.05–5.2)
SODIUM SERPL-SCNC: 138 MMOL/L (ref 136–145)
WBC # BLD AUTO: 7.7 K/UL (ref 4.3–11.1)

## 2018-12-10 PROCEDURE — 80048 BASIC METABOLIC PNL TOTAL CA: CPT

## 2018-12-10 PROCEDURE — 96374 THER/PROPH/DIAG INJ IV PUSH: CPT | Performed by: EMERGENCY MEDICINE

## 2018-12-10 PROCEDURE — 74011250636 HC RX REV CODE- 250/636: Performed by: EMERGENCY MEDICINE

## 2018-12-10 PROCEDURE — 85025 COMPLETE CBC W/AUTO DIFF WBC: CPT

## 2018-12-10 PROCEDURE — 74011000250 HC RX REV CODE- 250: Performed by: EMERGENCY MEDICINE

## 2018-12-10 PROCEDURE — 96361 HYDRATE IV INFUSION ADD-ON: CPT | Performed by: EMERGENCY MEDICINE

## 2018-12-10 PROCEDURE — 99284 EMERGENCY DEPT VISIT MOD MDM: CPT | Performed by: EMERGENCY MEDICINE

## 2018-12-10 PROCEDURE — 96375 TX/PRO/DX INJ NEW DRUG ADDON: CPT | Performed by: EMERGENCY MEDICINE

## 2018-12-10 RX ORDER — HYDROMORPHONE HYDROCHLORIDE 2 MG/1
1 TABLET ORAL
Qty: 2 TAB | Refills: 0 | Status: SHIPPED | OUTPATIENT
Start: 2018-12-10 | End: 2018-12-17

## 2018-12-10 RX ORDER — HYDROMORPHONE HYDROCHLORIDE 2 MG/ML
0.5 INJECTION, SOLUTION INTRAMUSCULAR; INTRAVENOUS; SUBCUTANEOUS
Status: COMPLETED | OUTPATIENT
Start: 2018-12-10 | End: 2018-12-10

## 2018-12-10 RX ADMIN — SODIUM CHLORIDE 1000 ML: 900 INJECTION, SOLUTION INTRAVENOUS at 14:37

## 2018-12-10 RX ADMIN — SODIUM CHLORIDE 1000 ML: 900 INJECTION, SOLUTION INTRAVENOUS at 15:35

## 2018-12-10 RX ADMIN — HYDROMORPHONE HYDROCHLORIDE 0.5 MG: 2 INJECTION, SOLUTION INTRAMUSCULAR; INTRAVENOUS; SUBCUTANEOUS at 15:34

## 2018-12-10 RX ADMIN — PROMETHAZINE HYDROCHLORIDE 12.5 MG: 25 INJECTION INTRAMUSCULAR; INTRAVENOUS at 15:34

## 2018-12-10 NOTE — LETTER
[facility] EMERGENCY DEPT 
[address] [place] [id number] 
[phone] Work/School Note Date: 12/10/2018 To Whom It May concern: 
 
Saranya Paulson was seen and treated today in the emergency room by the following provider(s): 
Attending Provider: Chance Slade MD. Saranya Paulson Special Instructions: Work excuse tonight Sincerely, 
 
 
 
 
Melvin Ramirez MD

## 2018-12-10 NOTE — ED NOTES
I have reviewed discharge instructions with the patient. The patient verbalized understanding. Patient left ED via Discharge Method: ambulatory to Home with boyfriend. Opportunity for questions and clarification provided. Patient given 1 scripts. To continue your aftercare when you leave the hospital, you may receive an automated call from our care team to check in on how you are doing. This is a free service and part of our promise to provide the best care and service to meet your aftercare needs.  If you have questions, or wish to unsubscribe from this service please call [phone]. Thank you for Choosing our [address] [place] [address] Emergency Department.

## 2018-12-10 NOTE — ED TRIAGE NOTES
Pt reports headache x 3 days with sudden increase in pain last night; also reports a return of bright red vaginal bleeding.

## 2018-12-11 ENCOUNTER — PATIENT OUTREACH (OUTPATIENT)
Dept: OTHER | Age: 33
End: 2018-12-11

## 2018-12-11 NOTE — LETTER
Ms. Prescott Leyden Algade [address] [place] [address] [place] [id number] Dear Ms. Dede Boateng, My name is Orlin Watt RN, Employee Care Manager for [employer], and I have been trying to reach you. The Employee Care Management program is a free-of-charge, confidential service provided to our employees and their family members covered by the [employer]. Part of my job is to follow up with members who have recently been in the hospital or emergency room, to help them coordinate their care and answer questions they may have about their visit. As healthcare providers, we know that patients do better when they have close follow up with a primary care provider (PCP), especially after a hospital or emergency department visit. If you do not have a PCP, I can help you find one that is convenient to you and covered by your insurance. I can also help you understand any after visit instructions, such as what symptoms to watch out for, or any new or changed medications. Remember that you can access your After Visit Summary by logging into your [employer] account. If you do not have a [employer] account, I can help you request access. Our program is designed to provide you with the opportunity to have a [employer] care manager partner with you for your healthcare needs. Please contact me at the below number if I can provide you with assistance for any of the above services. Sincerely, Orlin Watt RN, BSN  Employee Care Manager [email or web address]

## 2018-12-12 ENCOUNTER — PATIENT OUTREACH (OUTPATIENT)
Dept: OTHER | Age: 33
End: 2018-12-12

## 2018-12-12 NOTE — ED PROVIDER NOTES
Pregnant at 12 weeks with left sided headache x 3 days. Still with some HA. Has history of similar HA but this not resolved. No vision/speech /motor or sensory changes. n fever or stiff neck. No redness or rash. Known IUP and scant BRB but no clot. Told subchorionic source. Discussed with OB on call for her OB and states may give \"Dilaudid and Phenergan\"      The history is provided by the patient. Pregnancy Problem    This is a new problem. Associated symptoms include headaches. Pertinent negatives include no fever and no vomiting. Nothing worsens the pain.         Past Medical History:   Diagnosis Date    Abnormal Papanicolaou smear of cervix     Allergic rhinitis     Alopecia areata 2015    Dr Lopez Patches: s/p steroid injection    Anemia, iron deficiency     Asthma dx age 25    last used rescue inhaler in March 2018- better now- denies SOB with 1 flight of steps    Asthma exacerbation 03/2018    Depression     Encounter for insertion of mirena IUD 12/17/2015    GERD (gastroesophageal reflux disease)     controlled with protonix    Headache     LSIL (low grade squamous intraepithelial lesion) on Pap smear 7/7/2014    Migraine     Nasal polyposis     PUD (peptic ulcer disease)     hx of GI ulcers since 2011    Recurrent cystitis     neg cx, renal ultrasound normal, s/p uro evla, deferred cysto, possible IC    SVT (supraventricular tachycardia) ([facility] [address] 12/04/2013    s/p echo (2013- normal); occasionally will have palpitations but denies any medications at this time    Symptomatic varicose veins of left lower extremity 3/28/2017    Thromboembolus [facility])     s/p infiltrated IV in arm    Ulcer 8/2012    revision of gastric bypass (excision of ulcer); managed with protonix BID    Vaginitis     Varicose veins of legs        Past Surgical History:   Procedure Laterality Date    ABDOMEN SURGERY PROC UNLISTED      ulcer removed    ABDOMEN SURGERY PROC UNLISTED  04/24/2018    lap ventral hernia  HX CHOLECYSTECTOMY  2011    HX GASTRIC BYPASS  08/2009     and 2012 was revision with excision ulcer    HX GYN      x2 vaginal birth    HX HERNIA REPAIR      HX LAP CHOLECYSTECTOMY  2010    HX OTHER SURGICAL  2010,7/2016    scraped sinuses/ulcer surgery    HX TONSILLECTOMY  2008    HX WISDOM TEETH EXTRACTION      SINUS SURGERY PROC UNLISTED  2008, 2017    Deviated septum         Family History:   Problem Relation Age of Onset    Thyroid Disease Mother         Graves Disease    Other Mother         varicose veins    No Known Problems Father     No Known Problems Brother     Cancer Maternal Grandfather         Lung    Thyroid Disease Maternal Grandfather     Emphysema Paternal Grandmother     Breast Cancer Maternal Aunt     Other Maternal Aunt         vv    Other Other         Ulcers (unknown family member)    Thyroid Disease Son     Asthma Son        Social History     Socioeconomic History    Marital status: SINGLE     Spouse name: Not on file    Number of children: Not on file    Years of education: Not on file    Highest education level: Not on file   Social Needs    Financial resource strain: Not on file    Food insecurity - worry: Not on file    Food insecurity - inability: Not on file   [employer] needs - medical: Not on file   [employer] needs - non-medical: Not on file   Occupational History    Not on file   Tobacco Use    Smoking status: Never Smoker    Smokeless tobacco: Never Used   Substance and Sexual Activity    Alcohol use: No    Drug use: No    Sexual activity: Yes     Partners: Male     Birth control/protection: None   Other Topics Concern    Not on file   Social History Narrative    Has 2 children    [address] [facility] [employer]         ALLERGIES: Lactose; Levaquin [levofloxacin]; and Penicillins    Review of Systems   Constitutional: Negative for chills and fever. Respiratory: Negative. Cardiovascular: Negative. Gastrointestinal: Negative.   Negative for vomiting. Genitourinary: Negative. Neurological: Positive for headaches. Negative for weakness and numbness. Psychiatric/Behavioral: Negative. All other systems reviewed and are negative. Vitals:    12/10/18 1601 12/10/18 1615 12/10/18 1638 12/10/18 1658   BP: 107/64      Pulse:       Resp:       Temp:       SpO2: 100% 99% 100% 100%   Weight:       Height:                Physical Exam   Constitutional: She appears well-developed and well-nourished. No distress. HENT:   Head: Atraumatic. Right Ear: External ear normal.   Left Ear: External ear normal.   Nose: Nose normal.   Mouth/Throat: Oropharynx is clear and moist.   Eyes: EOM are normal. Pupils are equal, round, and reactive to light. No scleral icterus. Neck: Neck supple. Easy chin on chest   Cardiovascular: Normal rate. Pulmonary/Chest: Effort normal. No respiratory distress. Abdominal: Soft. There is no tenderness. There is no guarding. Musculoskeletal: Normal range of motion. She exhibits no edema. Neurological: She is alert. No sensory deficit. She exhibits normal muscle tone. Skin: Skin is warm and dry. She is not diaphoretic. Psychiatric: Her behavior is normal. Thought content normal.   Nursing note and vitals reviewed. MDM  Number of Diagnoses or Management Options  Other migraine without status migrainosus, not intractable:   Vaginal bleeding during pregnancy:   Diagnosis management comments: History of migraines and here with unilateral headache  Typical of her migraines  Vaginal bleeding is stable with no acute changes       Amount and/or Complexity of Data Reviewed  Discuss the patient with other providers: yes (ob)    Risk of Complications, Morbidity, and/or Mortality  Presenting problems: moderate  Diagnostic procedures: low  Management options: moderate  General comments:  To return with any worsening or concern           Procedures

## 2018-12-12 NOTE — PROGRESS NOTES
Patient on report as eligible for Case Management. Left discreet message on voicemail with this CM contact information. Will attempt to contact again to offer [phone] Management services.

## 2018-12-13 ENCOUNTER — PATIENT OUTREACH (OUTPATIENT)
Dept: OTHER | Age: 33
End: 2018-12-13

## 2018-12-13 NOTE — PROGRESS NOTES
Patient identified as eligible for [address] [place] [address] services. Second telephone outreach attempted. Left discreet voicemail with this CM confidential contact information. Will send UTR letter.

## 2018-12-13 NOTE — LETTER
Ms. Abner Cortez [address] [place] [address] Dear Ms. Mercedes Joseph, My name is Arnaud George RN, Employee Care Manager for [facility] and I have been trying to reach you. The Employee Care Management [facility]) program is a free-of-charge confidential service provided to our employees and their family members covered by the [facility]. The program will provide an employee and his/her family with the [facility] expertise to assist in navigating the health care delivery system, provider services, and their overall care needsso as to assure and improve health care interactions and enhance the quality of life. This program is designed to provide you with the opportunity to have a [facility] care manager partner with you for the following services: 
 
 1) when you come home from the hospital or emergency room 2) when help is needed to manage your disease 3) when you need assistance coordinating services or appointments [facility] now partners with [facility]. If you are a qualifying employee, you may receive an additional 10 wellness incentive points for every month of active participation with an Employee Care Manager. [facility] is dedicated to empowering the good health of its community and improving the quality of care and care experiences for employees and their families. We are committed to safeguarding patient confidentiality and privacy, assuring that every employee has the respect he or she deserves in managing their health. The information shared with your care manager will not be shared with anyone else aside from those you identify as part of your care team, and will only be used to assist you with any identified care needs. Please contact me if you would like this service provided to you. Sincerely, Arnaud George RN, BSN  Employee Care Manager [email or web address]

## 2018-12-14 PROBLEM — Z87.42 HISTORY OF ABNORMAL CERVICAL PAP SMEAR: Status: RESOLVED | Noted: 2018-11-07 | Resolved: 2018-12-14

## 2018-12-14 PROBLEM — I83.892 SYMPTOMATIC VARICOSE VEINS OF LEFT LOWER EXTREMITY: Chronic | Status: RESOLVED | Noted: 2017-03-28 | Resolved: 2018-12-14

## 2018-12-17 PROBLEM — O09.90 PREGNANCY, SUPERVISION, HIGH-RISK: Status: ACTIVE | Noted: 2018-11-07

## 2018-12-17 PROBLEM — O99.341 ANXIETY DURING PREGNANCY IN FIRST TRIMESTER, ANTEPARTUM: Status: ACTIVE | Noted: 2018-11-07

## 2018-12-17 PROBLEM — Z36.82 ENCOUNTER FOR (NT) NUCHAL TRANSLUCENCY SCAN: Status: ACTIVE | Noted: 2018-12-17

## 2018-12-17 PROBLEM — O09.211: Status: ACTIVE | Noted: 2018-11-07

## 2019-01-07 PROBLEM — Z36.82 ENCOUNTER FOR (NT) NUCHAL TRANSLUCENCY SCAN: Status: RESOLVED | Noted: 2018-12-17 | Resolved: 2019-01-07

## 2019-01-07 PROBLEM — O99.842 PREGNANCY AFFECTED BY PREVIOUS BARIATRIC SURGERY, CURRENTLY IN SECOND TRIMESTER: Status: ACTIVE | Noted: 2018-10-26

## 2019-01-08 PROBLEM — O99.412 CARDIAC DISEASE DURING PREGNANCY IN SECOND TRIMESTER: Status: ACTIVE | Noted: 2018-11-07

## 2019-01-08 PROBLEM — O99.012 ANEMIA DURING PREGNANCY IN SECOND TRIMESTER: Status: ACTIVE | Noted: 2018-11-12

## 2019-01-08 PROBLEM — O46.8X2 SUBCHORIONIC HEMORRHAGE IN SECOND TRIMESTER: Status: ACTIVE | Noted: 2018-11-07

## 2019-01-08 PROBLEM — O09.92 HIGH-RISK PREGNANCY IN SECOND TRIMESTER: Status: ACTIVE | Noted: 2019-01-08

## 2019-01-08 PROBLEM — O41.8X20 SUBCHORIONIC HEMORRHAGE IN SECOND TRIMESTER: Status: ACTIVE | Noted: 2018-11-07

## 2019-01-08 PROBLEM — O99.342 ANXIETY DURING PREGNANCY IN SECOND TRIMESTER, ANTEPARTUM: Status: ACTIVE | Noted: 2018-11-07

## 2019-01-08 PROBLEM — O09.212: Status: ACTIVE | Noted: 2018-11-07

## 2019-01-14 ENCOUNTER — PATIENT OUTREACH (OUTPATIENT)
Dept: OTHER | Age: 34
End: 2019-01-14

## 2019-01-24 ENCOUNTER — HOSPITAL ENCOUNTER (OUTPATIENT)
Dept: LAB | Age: 34
Discharge: HOME | End: 2019-01-24
Payer: COMMERCIAL

## 2019-01-24 DIAGNOSIS — O99.012 ANEMIA DURING PREGNANCY IN SECOND TRIMESTER: ICD-10-CM

## 2019-01-24 PROBLEM — D50.9 MATERNAL IRON DEFICIENCY ANEMIA AFFECTING PREGNANCY IN SECOND TRIMESTER, ANTEPARTUM: Status: ACTIVE | Noted: 2019-01-24

## 2019-01-24 LAB
ALBUMIN SERPL-MCNC: 2.9 G/DL (ref 3.5–5)
ALBUMIN/GLOB SERPL: 0.7 {RATIO} (ref 1.2–3.5)
ALP SERPL-CCNC: 50 U/L (ref 50–136)
ALT SERPL-CCNC: 10 U/L (ref 12–65)
ANION GAP SERPL CALC-SCNC: 7 MMOL/L (ref 7–16)
APPEARANCE UR: ABNORMAL
AST SERPL-CCNC: 13 U/L (ref 15–37)
BACTERIA URNS QL MICRO: ABNORMAL /HPF
BASOPHILS # BLD: 0.1 K/UL (ref 0–0.2)
BASOPHILS NFR BLD: 1 % (ref 0–2)
BILIRUB SERPL-MCNC: 0.1 MG/DL (ref 0.2–1.1)
BILIRUB UR QL: ABNORMAL
BUN SERPL-MCNC: 10 MG/DL (ref 6–23)
CALCIUM SERPL-MCNC: 8.4 MG/DL (ref 8.3–10.4)
CASTS URNS QL MICRO: 0 /LPF
CEA SERPL-MCNC: 0.9 NG/ML (ref 0–3)
CHLORIDE SERPL-SCNC: 107 MMOL/L (ref 98–107)
CO2 SERPL-SCNC: 23 MMOL/L (ref 21–32)
COLOR UR: YELLOW
CREAT SERPL-MCNC: 0.7 MG/DL (ref 0.6–1)
CRYSTALS URNS QL MICRO: 0 /LPF
DIFFERENTIAL METHOD BLD: ABNORMAL
EOSINOPHIL # BLD: 0.6 K/UL (ref 0–0.8)
EOSINOPHIL NFR BLD: 7 % (ref 0.5–7.8)
EPI CELLS #/AREA URNS HPF: ABNORMAL /HPF
ERYTHROCYTE [DISTWIDTH] IN BLOOD BY AUTOMATED COUNT: 15.9 % (ref 11.9–14.6)
ERYTHROCYTE [SEDIMENTATION RATE] IN BLOOD: 55 MM/HR (ref 0–20)
FERRITIN SERPL-MCNC: 7 NG/ML (ref 8–388)
FOLATE SERPL-MCNC: 51.9 NG/ML (ref 3.1–17.5)
GLOBULIN SER CALC-MCNC: 4.3 G/DL (ref 2.3–3.5)
GLUCOSE SERPL-MCNC: 77 MG/DL (ref 65–100)
GLUCOSE UR STRIP.AUTO-MCNC: NEGATIVE MG/DL
HCT VFR BLD AUTO: 27.1 % (ref 35.8–46.3)
HGB BLD-MCNC: 8.3 G/DL (ref 11.7–15.4)
HGB RETIC QN AUTO: 24 PG (ref 29–35)
HGB UR QL STRIP: ABNORMAL
IMM GRANULOCYTES # BLD AUTO: 0 K/UL (ref 0–0.5)
IMM GRANULOCYTES NFR BLD AUTO: 0 % (ref 0–5)
IMM RETICS NFR: 34.1 % (ref 3–15.9)
IRON SATN MFR SERPL: 3 %
IRON SERPL-MCNC: 16 UG/DL (ref 35–150)
KAPPA LC FREE SER-MCNC: 15.07 MG/L (ref 3.3–19.4)
KAPPA LC FREE/LAMBDA FREE SER: 1.05 {RATIO} (ref 0.26–1.65)
KETONES UR QL STRIP.AUTO: 15 MG/DL
LAMBDA LC FREE SERPL-MCNC: 14.33 MG/L (ref 5.71–26.3)
LDH SERPL L TO P-CCNC: 159 U/L (ref 100–190)
LEUKOCYTE ESTERASE UR QL STRIP.AUTO: ABNORMAL
LYMPHOCYTES # BLD: 1.8 K/UL (ref 0.5–4.6)
LYMPHOCYTES NFR BLD: 22 % (ref 13–44)
MCH RBC QN AUTO: 24.5 PG (ref 26.1–32.9)
MCHC RBC AUTO-ENTMCNC: 30.6 G/DL (ref 31.4–35)
MCV RBC AUTO: 79.9 FL (ref 79.6–97.8)
MONOCYTES # BLD: 0.4 K/UL (ref 0.1–1.3)
MONOCYTES NFR BLD: 5 % (ref 4–12)
MUCOUS THREADS URNS QL MICRO: ABNORMAL /LPF
NEUTS SEG # BLD: 5.1 K/UL (ref 1.7–8.2)
NEUTS SEG NFR BLD: 65 % (ref 43–78)
NITRITE UR QL STRIP.AUTO: NEGATIVE
NRBC # BLD: 0 K/UL (ref 0–0.2)
PERIPHERAL SMEAR,PSM: NORMAL
PH UR STRIP: 7 [PH] (ref 5–9)
PLATELET # BLD AUTO: 305 K/UL (ref 150–450)
PMV BLD AUTO: 9.8 FL (ref 9.4–12.3)
POTASSIUM SERPL-SCNC: 3.8 MMOL/L (ref 3.5–5.1)
PROT SERPL-MCNC: 7.2 G/DL (ref 6.3–8.2)
PROT UR STRIP-MCNC: 30 MG/DL
RBC # BLD AUTO: 3.39 M/UL (ref 4.05–5.25)
RBC #/AREA URNS HPF: ABNORMAL /HPF
RETICS # AUTO: 0.05 M/UL (ref 0.03–0.1)
RETICS/RBC NFR AUTO: 1.5 % (ref 0.3–2)
SODIUM SERPL-SCNC: 137 MMOL/L (ref 136–145)
SP GR UR REFRACTOMETRY: 1.02 (ref 1–1.02)
T4 FREE SERPL-MCNC: 0.9 NG/DL (ref 0.78–1.46)
TIBC SERPL-MCNC: 581 UG/DL (ref 250–450)
TSH SERPL DL<=0.005 MIU/L-ACNC: 2.27 UIU/ML (ref 0.36–3.74)
URATE SERPL-MCNC: 4.1 MG/DL (ref 2.6–6)
UROBILINOGEN UR QL STRIP.AUTO: 0.2 EU/DL (ref 0.2–1)
VIT B12 SERPL-MCNC: 356 PG/ML (ref 193–986)
WBC # BLD AUTO: 7.8 K/UL (ref 4.3–11.1)
WBC URNS QL MICRO: ABNORMAL /HPF

## 2019-01-24 PROCEDURE — 82378 CARCINOEMBRYONIC ANTIGEN: CPT

## 2019-01-24 PROCEDURE — 87389 HIV-1 AG W/HIV-1&-2 AB AG IA: CPT

## 2019-01-24 PROCEDURE — 85652 RBC SED RATE AUTOMATED: CPT

## 2019-01-24 PROCEDURE — 86038 ANTINUCLEAR ANTIBODIES: CPT

## 2019-01-24 PROCEDURE — 80074 ACUTE HEPATITIS PANEL: CPT

## 2019-01-24 PROCEDURE — 85025 COMPLETE CBC W/AUTO DIFF WBC: CPT

## 2019-01-24 PROCEDURE — 82785 ASSAY OF IGE: CPT

## 2019-01-24 PROCEDURE — 83021 HEMOGLOBIN CHROMOTOGRAPHY: CPT

## 2019-01-24 PROCEDURE — 82607 VITAMIN B-12: CPT

## 2019-01-24 PROCEDURE — 83615 LACTATE (LD) (LDH) ENZYME: CPT

## 2019-01-24 PROCEDURE — 84238 ASSAY NONENDOCRINE RECEPTOR: CPT

## 2019-01-24 PROCEDURE — 84443 ASSAY THYROID STIM HORMONE: CPT

## 2019-01-24 PROCEDURE — 83540 ASSAY OF IRON: CPT

## 2019-01-24 PROCEDURE — 85046 RETICYTE/HGB CONCENTRATE: CPT

## 2019-01-24 PROCEDURE — 82747 ASSAY OF FOLIC ACID RBC: CPT

## 2019-01-24 PROCEDURE — 81003 URINALYSIS AUTO W/O SCOPE: CPT

## 2019-01-24 PROCEDURE — 86334 IMMUNOFIX E-PHORESIS SERUM: CPT

## 2019-01-24 PROCEDURE — 84165 PROTEIN E-PHORESIS SERUM: CPT

## 2019-01-24 PROCEDURE — 81015 MICROSCOPIC EXAM OF URINE: CPT

## 2019-01-24 PROCEDURE — 82728 ASSAY OF FERRITIN: CPT

## 2019-01-24 PROCEDURE — 82746 ASSAY OF FOLIC ACID SERUM: CPT

## 2019-01-24 PROCEDURE — 36415 COLL VENOUS BLD VENIPUNCTURE: CPT

## 2019-01-24 PROCEDURE — 84439 ASSAY OF FREE THYROXINE: CPT

## 2019-01-24 PROCEDURE — 83883 ASSAY NEPHELOMETRY NOT SPEC: CPT

## 2019-01-24 PROCEDURE — 84550 ASSAY OF BLOOD/URIC ACID: CPT

## 2019-01-24 PROCEDURE — 80053 COMPREHEN METABOLIC PANEL: CPT

## 2019-01-25 ENCOUNTER — HOSPITAL ENCOUNTER (OUTPATIENT)
Dept: INFUSION THERAPY | Age: 34
Discharge: HOME | End: 2019-01-25
Payer: COMMERCIAL

## 2019-01-25 VITALS
SYSTOLIC BLOOD PRESSURE: 113 MMHG | BODY MASS INDEX: 33.42 KG/M2 | OXYGEN SATURATION: 100 % | TEMPERATURE: 98.1 F | HEART RATE: 74 BPM | RESPIRATION RATE: 16 BRPM | DIASTOLIC BLOOD PRESSURE: 72 MMHG | WEIGHT: 179.8 LBS

## 2019-01-25 DIAGNOSIS — D50.9 MATERNAL IRON DEFICIENCY ANEMIA AFFECTING PREGNANCY IN SECOND TRIMESTER, ANTEPARTUM: Primary | ICD-10-CM

## 2019-01-25 DIAGNOSIS — O99.012 MATERNAL IRON DEFICIENCY ANEMIA AFFECTING PREGNANCY IN SECOND TRIMESTER, ANTEPARTUM: Primary | ICD-10-CM

## 2019-01-25 DIAGNOSIS — O09.92 HIGH-RISK PREGNANCY IN SECOND TRIMESTER: ICD-10-CM

## 2019-01-25 LAB
ALBUMIN SERPL ELPH-MCNC: 3.18 G/DL (ref 3.2–5.6)
ALBUMIN/GLOB SERPL: 0.9 {RATIO}
ALPHA1 GLOB SERPL ELPH-MCNC: 0.36 G/DL (ref 0.1–0.4)
ALPHA2 GLOB SERPL ELPH-MCNC: 0.81 G/DL (ref 0.4–1.2)
ANA SER QL: NEGATIVE
B-GLOBULIN SERPL QL ELPH: 1.33 G/DL (ref 0.6–1.3)
DEPRECATED HGB OTHER BLD-IMP: 0 %
FOLATE BLD-MCNC: 429.8 NG/ML
FOLATE RBC-MCNC: 1598 NG/ML
GAMMA GLOB MFR SERPL ELPH: 1.12 G/DL (ref 0.5–1.6)
HAV IGM SERPL QL IA: NEGATIVE
HBV CORE IGM SERPL QL IA: NEGATIVE
HBV SURFACE AG SERPL QL IA: NEGATIVE
HCT VFR BLD AUTO: 26.9 % (ref 34–46.6)
HCV AB S/CO SERPL IA: 0.1 S/CO RATIO (ref 0–0.9)
HGB A MFR BLD: 97.7 % (ref 96.4–98.8)
HGB A2 MFR BLD COLUMN CHROM: 2.3 % (ref 1.8–3.2)
HGB C MFR BLD: 0 %
HGB F MFR BLD: 0 % (ref 0–2)
HGB FRACT BLD-IMP: NORMAL
HGB S BLD QL SOLY: NEGATIVE
HGB S MFR BLD: 0 %
HIV 1+2 AB+HIV1 P24 AG SERPL QL IA: NON REACTIVE
IGA SERPL-MCNC: 135 MG/DL (ref 85–499)
IGG SERPL-MCNC: 1012 MG/DL (ref 610–1616)
IGM SERPL-MCNC: 150 MG/DL (ref 35–242)
M PROTEIN SERPL ELPH-MCNC: ABNORMAL G/DL
PROT PATTERN SERPL ELPH-IMP: ABNORMAL
PROT PATTERN SPEC IFE-IMP: ABNORMAL
PROT SERPL-MCNC: 6.8 G/DL (ref 6.3–8.2)
TRANSFERRIN SERPL-SCNC: 40.6 NMOL/L (ref 12.2–27.3)

## 2019-01-25 PROCEDURE — 96365 THER/PROPH/DIAG IV INF INIT: CPT

## 2019-01-25 PROCEDURE — 96361 HYDRATE IV INFUSION ADD-ON: CPT

## 2019-01-25 PROCEDURE — 74011250636 HC RX REV CODE- 250/636: Performed by: PEDIATRICS

## 2019-01-25 RX ADMIN — FERRIC CARBOXYMALTOSE INJECTION 750 MG: 50 INJECTION, SOLUTION INTRAVENOUS at 08:45

## 2019-01-25 RX ADMIN — SODIUM CHLORIDE 500 ML: 900 INJECTION, SOLUTION INTRAVENOUS at 09:06

## 2019-01-25 NOTE — PROGRESS NOTES
Arrived to the [facility] ambulatory. injectafer completed. Patient tolerated well. Any issues or concerns during appointment: no.  Patient aware of next infusion appointment on 2/1 at 0830. Discharged to home ambulatory.

## 2019-01-26 ENCOUNTER — HOSPITAL ENCOUNTER (OUTPATIENT)
Age: 34
Discharge: HOME | End: 2019-01-26
Attending: OBSTETRICS & GYNECOLOGY | Admitting: OBSTETRICS & GYNECOLOGY
Payer: COMMERCIAL

## 2019-01-26 VITALS
RESPIRATION RATE: 18 BRPM | TEMPERATURE: 97.9 F | SYSTOLIC BLOOD PRESSURE: 111 MMHG | HEART RATE: 79 BPM | DIASTOLIC BLOOD PRESSURE: 66 MMHG

## 2019-01-26 PROBLEM — N89.8 VAGINAL DISCHARGE DURING PREGNANCY IN SECOND TRIMESTER: Status: ACTIVE | Noted: 2019-01-26

## 2019-01-26 PROBLEM — O26.892 VAGINAL DISCHARGE DURING PREGNANCY IN SECOND TRIMESTER: Status: ACTIVE | Noted: 2019-01-26

## 2019-01-26 LAB — IGE SERPL-ACNC: 525 IU/ML (ref 0–100)

## 2019-01-26 PROCEDURE — 99282 EMERGENCY DEPT VISIT SF MDM: CPT | Performed by: OBSTETRICS & GYNECOLOGY

## 2019-01-26 PROCEDURE — 76817 TRANSVAGINAL US OBSTETRIC: CPT | Performed by: OBSTETRICS & GYNECOLOGY

## 2019-01-26 PROCEDURE — 76815 OB US LIMITED FETUS(S): CPT

## 2019-01-26 PROCEDURE — 99282 EMERGENCY DEPT VISIT SF MDM: CPT

## 2019-01-26 RX ORDER — SUMATRIPTAN 50 MG/1
50 TABLET, FILM COATED ORAL
Status: DISCONTINUED | OUTPATIENT
Start: 2019-01-26 | End: 2019-01-26

## 2019-01-26 RX ORDER — SUMATRIPTAN 50 MG/1
50 TABLET, FILM COATED ORAL
Status: DISCONTINUED | OUTPATIENT
Start: 2019-01-26 | End: 2019-01-26 | Stop reason: HOSPADM

## 2019-01-26 NOTE — H&P
CC: headache and bleeding 
 
34 y.o. female at 19w1d 
weeks gestation with known subchorionic hemorrhage followed by m and had scan yesterday. Had some bleeding today (old brown blood) and came in to be checked. Also reports having migraine. Has note in chart regarding use of fake ids to get narcotics. HISTORY: 
OB History  Para Term  AB Living 3 2 2     2 SAB TAB Ectopic Molar Multiple Live Births 2  
  
# Outcome Date GA Lbr Elie/2nd Weight Sex Delivery Anes PTL Lv  
3 Current 2 Term 13 38w4d  3.03 kg M VAGINAL DELI EPIDURAL AN N CARLOZ Birth Comments: Hx PTL with MgSO4 at 30w, Induction 1 Term 11 39w0d 05:00 3.033 kg M VAGINAL DELI EPI Birth Comments: Induction Social History Substance and Sexual Activity Sexual Activity Yes  Partners: Male  Birth control/protection: None No LMP recorded (lmp unknown). Patient is pregnant. Social History Socioeconomic History  Marital status: SINGLE Spouse name: Not on file  Number of children: Not on file  Years of education: Not on file  Highest education level: Not on file Social Needs  Financial resource strain: Not on file  Food insecurity - worry: Not on file  Food insecurity - inability: Not on file  Transportation needs - medical: Not on file  Transportation needs - non-medical: Not on file Occupational History  Not on file Tobacco Use  Smoking status: Never Smoker  Smokeless tobacco: Never Used Substance and Sexual Activity  Alcohol use: No  
 Drug use: No  
 Sexual activity: Yes  
  Partners: Male Birth control/protection: None Other Topics Concern  Not on file Social History Narrative Has 2 children [id number] [facility] Past Surgical History:  
Procedure Laterality Date  ABDOMEN SURGERY PROC UNLISTED    
 ulcer removed  ABDOMEN SURGERY PROC UNLISTED  2018 lap ventral hernia  HX CHOLECYSTECTOMY  2011  HX GASTRIC BYPASS  08/2009  
  and 2012 was revision with excision ulcer  HX GYN    
 x2 vaginal birth  HX HERNIA REPAIR    
 HX LAP CHOLECYSTECTOMY  2010  HX OTHER SURGICAL  2010,7/2016  
 scraped sinuses/ulcer surgery  HX TONSILLECTOMY  2008  HX WISDOM TEETH EXTRACTION    
Ambika Fester SINUS SURGERY PROC UNLISTED  2008, 2017 Deviated septum Past Medical History:  
Diagnosis Date  Abnormal Papanicolaou smear of cervix  Allergic rhinitis  Alopecia areata 2015 Dr Lott Memos: s/p steroid injection  Anemia, iron deficiency  Asthma dx age 25  
 last used rescue inhaler in March 2018- better now- denies SOB with 1 flight of steps  Asthma exacerbation 03/2018  Depression  Encounter for insertion of mirena IUD 12/17/2015  GERD (gastroesophageal reflux disease)   
 controlled with protonix  Headache  History of abnormal cervical Pap smear 11/7/2018  LSIL (low grade squamous intraepithelial lesion) on Pap smear 7/7/2014  Migraine  Nasal polyposis  PUD (peptic ulcer disease)   
 hx of GI ulcers since 2011  Recurrent cystitis   
 neg cx, renal ultrasound normal, s/p uro evla, deferred cysto, possible IC  
 SVT (supraventricular tachycardia) ([address] 12/04/2013  
 s/p echo (2013- normal); occasionally will have palpitations but denies any medications at this time  Symptomatic varicose veins of left lower extremity 3/28/2017  Thromboembolus ([address]   
 s/p infiltrated IV in arm  Ulcer 8/2012  
 revision of gastric bypass (excision of ulcer); managed with protonix BID  Vaginitis  Varicose veins of legs ROS: 
Negative: 
 negative 10 point ROS except as noted in HPI Positive: 
 per hpi PHYSICAL EXAM: 
There were no vitals taken for this visit. The patient appears well, alert, oriented x 3. Appropriate affect. Lungs are clear. Heart RRR, no murmurs. Abdomen soft, non-tender, no rebound/guarding. Fundus soft and non tender Skin warm, dry, no rashes Ext no edema, DTR's normal 
 
SSE: small amount of old blood in vault Fetal Heart Rate: doppler TVUS: cervix length 4 cm Assessment: 
34 y.o. female at 19w1d, chronic subchorionic hematoma, no active bleeding, normal cervical length.migraine Plan:  
Imiitrex, d/c with same precautions. rh pos Monica Guo MD

## 2019-01-26 NOTE — DISCHARGE INSTRUCTIONS
Patient Education        Vaginal Bleeding During Pregnancy: Care Instructions  Your Care Instructions    Many women have some vaginal bleeding when they are pregnant. In some cases, the bleeding is not serious. And there aren't any more problems with the pregnancy. But sometimes bleeding is a sign of a more serious problem. This is more common if the bleeding is heavy or painful. Examples of more serious problems include miscarriage, an ectopic pregnancy, or a problem with the placenta. You may have to see your doctor again to be sure everything is okay. You may also need more tests to find the cause of the bleeding. For some women, home treatment is all they need. But it depends on what is causing the bleeding. Be sure to tell your doctor if you have any new symptoms or if your symptoms get worse. The doctor has checked you carefully, but problems can develop later. If you notice any problems or new symptoms, get medical treatment right away. Follow-up care is a key part of your treatment and safety. Be sure to make and go to all appointments, and call your doctor if you are having problems. It's also a good idea to know your test results and keep a list of the medicines you take. How can you care for yourself at home? · If your doctor prescribed medicines, take them exactly as directed. Call your doctor if you think you are having a problem with your medicine. · Do not have sex until the bleeding stops and your doctor says it's okay. · Ask your doctor about other activities you can or can't do. · Get a lot of rest. Being pregnant can make you tired. · Eat a healthy diet. Include a lot of peas, beans, and leafy green vegetables. Talk to a dietitian if you need help planning your diet. · Do not use nonsteroidal anti-inflammatory drugs (NSAIDs), such as ibuprofen (Advil, Motrin), naproxen (Aleve), or aspirin, unless your doctor says it is okay. When should you call for help?   Call 911 anytime you think you may need emergency care. For example, call if:    · You passed out (lost consciousness).     · You have severe vaginal bleeding.    Call your doctor now or seek immediate medical care if:    · You have any vaginal bleeding.     · You have pain in your belly or pelvis.     · You think that you are in labor.     · You have a sudden release of fluid from your vagina.     · You notice that your baby has stopped moving or is moving much less than normal.    Watch closely for changes in your health, and be sure to contact your doctor if you have any questions or concerns. Where can you learn more? Go to http://amelia-loni.info/. Enter P105 in the search box to learn more about \"Vaginal Bleeding During Pregnancy: Care Instructions. \"  Current as of: September 5, 2018  Content Version: 11.9  © 0527-8071 [employer]. Care instructions adapted under license by [employer] (which disclaims liability or warranty for this information). If you have questions about a medical condition or this instruction, always ask your healthcare professional. [address] any warranty or liability for your use of this information.

## 2019-01-28 ENCOUNTER — PATIENT OUTREACH (OUTPATIENT)
Dept: OTHER | Age: 34
End: 2019-01-28

## 2019-01-28 PROBLEM — O26.892 VAGINAL DISCHARGE DURING PREGNANCY IN SECOND TRIMESTER: Status: RESOLVED | Noted: 2019-01-26 | Resolved: 2019-01-28

## 2019-01-28 PROBLEM — N89.8 VAGINAL DISCHARGE DURING PREGNANCY IN SECOND TRIMESTER: Status: RESOLVED | Noted: 2019-01-26 | Resolved: 2019-01-28

## 2019-01-28 PROBLEM — O20.0 THREATENED ABORTION: Status: ACTIVE | Noted: 2019-01-28

## 2019-01-28 NOTE — PROGRESS NOTES
Initial HPRP:  
Patient on report as discharged from [facility] OP Visit 1/26/19 for Vaginal Bleeding. Initial attempt to contact patient for transitions of care.  Left discreet message on voicemail with this Care Coordinator's contact information.  Will attempt outreach on 1/29/19. 
   
Call 911 anytime you think you may need emergency care. For example, call if: You passed out (lost consciousness). You have severe vaginal bleeding. Call your doctor now or seek immediate medical care if: 
You have any vaginal bleeding. You have pain in your belly or pelvis. You think that you are in labor. You have a sudden release of fluid from your vagina.  
You notice that your baby has stopped moving or is moving much less than normal.

## 2019-01-29 ENCOUNTER — PATIENT OUTREACH (OUTPATIENT)
Dept: OTHER | Age: 34
End: 2019-01-29

## 2019-01-29 NOTE — PROGRESS NOTES
HPRP progress note Patient eligible for [facility] Employee care management Received notification of discharge from [facility] OP on 1/26/19 for Vaginal Bleeding. Contacted patient to discuss post discharge needs and offer care management services. Two patient identifiers verified. Discussed the care management program.  Patient agrees to care management services at this time. PMH:  
Past Medical History:  
Diagnosis Date  Abnormal Papanicolaou smear of cervix  Allergic rhinitis  Alopecia areata 2015 Dr Rosette Zambrano: s/p steroid injection  Anemia, iron deficiency  Asthma dx age 25  
 last used rescue inhaler in March 2018- better now- denies SOB with 1 flight of steps  Asthma exacerbation 03/2018  Depression  Encounter for insertion of mirena IUD 12/17/2015  GERD (gastroesophageal reflux disease)   
 controlled with protonix  Headache  History of abnormal cervical Pap smear 11/7/2018  LSIL (low grade squamous intraepithelial lesion) on Pap smear 7/7/2014  Migraine  Nasal polyposis  PUD (peptic ulcer disease)   
 hx of GI ulcers since 2011  Recurrent cystitis   
 neg cx, renal ultrasound normal, s/p uro evla, deferred cysto, possible IC  
 SVT (supraventricular tachycardia) ([address] 12/04/2013  
 s/p echo (2013- normal); occasionally will have palpitations but denies any medications at this time  Symptomatic varicose veins of left lower extremity 3/28/2017  Thromboembolus ([address]   
 s/p infiltrated IV in arm  Ulcer 8/2012  
 revision of gastric bypass (excision of ulcer); managed with protonix BID  Vaginitis  Varicose veins of legs Social History:  
Social History Socioeconomic History  Marital status: SINGLE Spouse name: Not on file  Number of children: Not on file  Years of education: Not on file  Highest education level: Not on file Social Needs  Financial resource strain: Not on file  Food insecurity - worry: Not on file  Food insecurity - inability: Not on file  Transportation needs - medical: Not on file  Transportation needs - non-medical: Not on file Occupational History  Not on file Tobacco Use  Smoking status: Never Smoker  Smokeless tobacco: Never Used Substance and Sexual Activity  Alcohol use: No  
 Drug use: No  
 Sexual activity: Yes  
  Partners: Male Birth control/protection: None Other Topics Concern  Not on file Social History Narrative Has 2 children [address] [place] [address] management assessment completed: 
*Patient returned call and reports that she still is having vaginal bleeding. 
 -Using 3-4 pads daily.  
 -Is being followed carefully due to high risk pregnancy. *Patient states that she has iron deficiency anemia and is getting weekly infusions. *Patient denies - all Reg Flags Symptoms except for vaginal bleeding at this time. Had  OB/GYN appointment today - Dannielle Ashton MD 
 
Patient presents with  Ultrasound  High Risk OB due date of Estimated Date of Delivery: 6/21/19  
  
    Abruption w/ VB, Hx of SVT, Hx of Gastric Bypass, Anxiety, Anemia, Asthma Reports migraine HAs daily; Tylenol doesn't help, Fioricet helps sometimes, reports nothing really helping except rest. No edema. Denies preeclamptic symptoms.  Reports good fetal movement.  No LOF, ctxs, or vaginal pressure.  Reports increased from bleeding mostly due to laying down and pooling.  Reports using 3-4 pads all day with varying degrees of bleeding.  Reports occasional \"menstrual-like\" cramping throughout the day.  States always bleeding mostly bright red Assessment/Plan High-risk pregnancy in second trimester ·  I reassured patient that high chance to resolve, no treatment except bedrest. 
·  Still having bleeding with [facility] with varying amounts using 3-4 pads daily. Occasional cramping. · Anemia being managed by Dr. Mao Sarabia  Pt increasing anxiety requesting increase in Zoloft dosage Anxiety during pregnancy in second trimester, antepartum - Currently taking Zoloft 50 mg daily. Asthma affecting pregnancy in second trimester - Continues to take Albuterol prn Follow-up Disposition: 
Return in about 1 week (around 2/5/2019) for US growth, BPP, UA Doppler study. Red Flags: 
Call 911 anytime you think you may need emergency care. For example, call if: You passed out (lost consciousness). You have severe vaginal bleeding. 
  
  
Call your doctor now or seek immediate medical care if: 
You have any vaginal bleeding. You have pain in your belly or pelvis. You think that you are in labor. You have a sudden release of fluid from your vagina. You notice that your baby has stopped moving or is moving much less than normal. 
 
Medications: 
New Medications at Discharge: Phenergan 25mg q6hr 
butalbital-acetaminophen-caffeine (FIORICET, ESGIC) -40 mg per tablet Changed Medications at Discharge: Zoloft 100mg daily Discontinued Medications at Discharge: Zoloft 50 mg daily Current Outpatient Medications Medication Sig  promethazine (PHENERGAN) 25 mg tablet Take 1 Tab by mouth every six (6) hours as needed for Nausea.  butalbital-acetaminophen-caffeine (FIORICET, ESGIC) -40 mg per tablet Take 1 Tab by mouth every six (6) hours as needed for Pain.  sertraline (ZOLOFT) 100 mg tablet Take 1 Tab by mouth daily.  prenatal vitamin (PRENATAL MULTIVITAMINS) 28 mg iron- 800 mcg tab tablet Take 1 Tab by mouth daily.  pantoprazole (PROTONIX) 40 mg tablet 1 bid (Patient taking differently: 1 bid Per anesthesia protocol:instructed to take am of surgery.)  albuterol (VENTOLIN HFA) 90 mcg/actuation inhaler Take 2 Puffs by inhalation every four (4) hours as needed for Wheezing or Shortness of Breath (cough).  (Patient taking differently: Take 2 Puffs by inhalation every four (4) hours as needed for Wheezing or Shortness of Breath (cough). Patient instructed to bring med (in Rx bottle)to hospital) No current facility-administered medications for this visit. There are no discontinued medications. Performed medication reconciliation with patient, and patient verbalizes understanding of administration of home medications. There were no barriers to obtaining medications identified at this time. Preventative Care Health Maintenance Topic Date Due  
 PAP AKA CERVICAL CYTOLOGY  06/23/2017  Influenza Age 5 to Adult  08/01/2018  DTaP/Tdap/Td series (2 - Td) 12/31/2023  Pneumococcal 19-64 Medium Risk  Completed Barriers/Support system: 
patient and boyfriend Home health/DME in place per discharge orders Barriers/Challenges to Care: []  Decline in memory    []  Language barrier    
[]  Emotional                  []  Limited mobility 
[]  Lack of motivation     [] Vision, hearing or cognitive impairment []  Knowledge [] Financial Barriers []  Lack of support  []  Pain [x]  Other - Vaginal Bleeding []  None PCP/Specialist follow up: Patient is not  scheduled to follow up with Missael Ortiz MD. Has various OB and Infusion appointments. Future Appointments Date Time Provider Seble Alberto 2/1/2019  8:30 AM  GCCOPIG GVL [facility]  
2/5/2019  2:45 PM [facility] ULTRASOUND 1 [facility]  
2/5/2019  3:45 PM Nkii Rankin MD [facility]  
2/7/2019  8:30 AM Alt, Mariana Goldberg,  UPSG UPSG  
2/26/2019  8:20 AM [facility] OUTREACH INSURANCE GCCOIG GVL [id number]  
2/26/2019  9:00 AM Kyra Concepcion MD [facility] Reviewed red flags with patient, and patient verbalizes understanding. Patient given an opportunity to ask questions. No other clinical/social/functional needs noted. The patient agrees to contact the PCP office for questions related to their healthcare.   The patient expressed thanks, offered no additional questions and ended the call. Plan for next call: 2/13/19 f/u - OB/Infusion Appts and Vaginal Bleeding.

## 2019-02-01 ENCOUNTER — HOSPITAL ENCOUNTER (OUTPATIENT)
Dept: INFUSION THERAPY | Age: 34
Discharge: HOME | End: 2019-02-01
Payer: COMMERCIAL

## 2019-02-01 VITALS
BODY MASS INDEX: 34.2 KG/M2 | OXYGEN SATURATION: 100 % | HEART RATE: 84 BPM | RESPIRATION RATE: 18 BRPM | WEIGHT: 181 LBS | TEMPERATURE: 98.4 F | SYSTOLIC BLOOD PRESSURE: 82 MMHG | DIASTOLIC BLOOD PRESSURE: 61 MMHG

## 2019-02-01 DIAGNOSIS — D50.9 MATERNAL IRON DEFICIENCY ANEMIA AFFECTING PREGNANCY IN SECOND TRIMESTER, ANTEPARTUM: Primary | ICD-10-CM

## 2019-02-01 DIAGNOSIS — O99.012 MATERNAL IRON DEFICIENCY ANEMIA AFFECTING PREGNANCY IN SECOND TRIMESTER, ANTEPARTUM: Primary | ICD-10-CM

## 2019-02-01 DIAGNOSIS — O09.92 HIGH-RISK PREGNANCY IN SECOND TRIMESTER: ICD-10-CM

## 2019-02-01 LAB
BASOPHILS # BLD: 0.1 K/UL (ref 0–0.2)
BASOPHILS NFR BLD: 1 % (ref 0–2)
DIFFERENTIAL METHOD BLD: ABNORMAL
EOSINOPHIL # BLD: 0.6 K/UL (ref 0–0.8)
EOSINOPHIL NFR BLD: 7 % (ref 0.5–7.8)
ERYTHROCYTE [DISTWIDTH] IN BLOOD BY AUTOMATED COUNT: 19.7 % (ref 11.9–14.6)
HCT VFR BLD AUTO: 28.1 % (ref 35.8–46.3)
HGB BLD-MCNC: 8.6 G/DL (ref 11.7–15.4)
IMM GRANULOCYTES # BLD AUTO: 0.1 K/UL (ref 0–0.5)
IMM GRANULOCYTES NFR BLD AUTO: 1 % (ref 0–5)
LYMPHOCYTES # BLD: 2.1 K/UL (ref 0.5–4.6)
LYMPHOCYTES NFR BLD: 24 % (ref 13–44)
MCH RBC QN AUTO: 25.4 PG (ref 26.1–32.9)
MCHC RBC AUTO-ENTMCNC: 30.6 G/DL (ref 31.4–35)
MCV RBC AUTO: 83.1 FL (ref 79.6–97.8)
MONOCYTES # BLD: 0.4 K/UL (ref 0.1–1.3)
MONOCYTES NFR BLD: 4 % (ref 4–12)
NEUTS SEG # BLD: 5.4 K/UL (ref 1.7–8.2)
NEUTS SEG NFR BLD: 64 % (ref 43–78)
NRBC # BLD: 0.01 K/UL (ref 0–0.2)
PLATELET # BLD AUTO: 296 K/UL (ref 150–450)
PMV BLD AUTO: 9.5 FL (ref 9.4–12.3)
RBC # BLD AUTO: 3.38 M/UL (ref 4.05–5.25)
WBC # BLD AUTO: 8.5 K/UL (ref 4.3–11.1)

## 2019-02-01 PROCEDURE — 85025 COMPLETE CBC W/AUTO DIFF WBC: CPT

## 2019-02-01 PROCEDURE — 96365 THER/PROPH/DIAG IV INF INIT: CPT

## 2019-02-01 PROCEDURE — 74011250636 HC RX REV CODE- 250/636: Performed by: PEDIATRICS

## 2019-02-01 RX ADMIN — FERRIC CARBOXYMALTOSE INJECTION 750 MG: 50 INJECTION, SOLUTION INTRAVENOUS at 09:05

## 2019-02-01 RX ADMIN — SODIUM CHLORIDE 500 ML: 900 INJECTION, SOLUTION INTRAVENOUS at 09:01

## 2019-02-01 NOTE — PROGRESS NOTES
Arrived to the [facility]. Iron completed. Patient tolerated well. PIV removed intact, site clear. Any issues or concerns during appointment: None. Patient aware no future infusion appointments. Patient to follow up with physician. Discharged ambulatory in stable condition.

## 2019-02-13 ENCOUNTER — PATIENT OUTREACH (OUTPATIENT)
Dept: OTHER | Age: 34
End: 2019-02-13

## 2019-02-13 NOTE — PROGRESS NOTES
HPRP f/u: 
Telephone attempt to contact patient for Health Promotion and Risk Prevention. Left discreet message on voicemail with this CC contact information. Will follow for one month for transitions of care needs. Next outreach is 2/28/19 for discussion f/u  Vaginal Bleeding and  Resolve Episode.

## 2019-02-26 ENCOUNTER — HOSPITAL ENCOUNTER (OUTPATIENT)
Dept: LAB | Age: 34
Discharge: HOME | End: 2019-02-26
Payer: COMMERCIAL

## 2019-02-26 DIAGNOSIS — O09.92 HIGH-RISK PREGNANCY IN SECOND TRIMESTER: ICD-10-CM

## 2019-02-26 DIAGNOSIS — O99.012 MATERNAL IRON DEFICIENCY ANEMIA AFFECTING PREGNANCY IN SECOND TRIMESTER, ANTEPARTUM: ICD-10-CM

## 2019-02-26 DIAGNOSIS — D50.9 MATERNAL IRON DEFICIENCY ANEMIA AFFECTING PREGNANCY IN SECOND TRIMESTER, ANTEPARTUM: ICD-10-CM

## 2019-02-26 PROBLEM — E66.01 SEVERE OBESITY (HCC): Status: ACTIVE | Noted: 2019-02-26

## 2019-02-26 LAB
ALBUMIN SERPL-MCNC: 2.6 G/DL (ref 3.5–5)
ALBUMIN/GLOB SERPL: 0.7 {RATIO} (ref 1.2–3.5)
ALP SERPL-CCNC: 48 U/L (ref 50–136)
ALT SERPL-CCNC: 12 U/L (ref 12–65)
ANION GAP SERPL CALC-SCNC: 6 MMOL/L (ref 7–16)
AST SERPL-CCNC: 14 U/L (ref 15–37)
BASOPHILS # BLD: 0.1 K/UL (ref 0–0.2)
BASOPHILS NFR BLD: 1 % (ref 0–2)
BILIRUB SERPL-MCNC: 0.2 MG/DL (ref 0.2–1.1)
BUN SERPL-MCNC: 10 MG/DL (ref 6–23)
CALCIUM SERPL-MCNC: 8.2 MG/DL (ref 8.3–10.4)
CHLORIDE SERPL-SCNC: 111 MMOL/L (ref 98–107)
CO2 SERPL-SCNC: 23 MMOL/L (ref 21–32)
CREAT SERPL-MCNC: 0.66 MG/DL (ref 0.6–1)
DIFFERENTIAL METHOD BLD: ABNORMAL
EOSINOPHIL # BLD: 0.6 K/UL (ref 0–0.8)
EOSINOPHIL NFR BLD: 8 % (ref 0.5–7.8)
FERRITIN SERPL-MCNC: 188 NG/ML (ref 8–388)
GLOBULIN SER CALC-MCNC: 3.5 G/DL (ref 2.3–3.5)
GLUCOSE SERPL-MCNC: 95 MG/DL (ref 65–100)
HCT VFR BLD AUTO: 31.4 % (ref 35.8–46.3)
HGB BLD-MCNC: 9.7 G/DL (ref 11.7–15.4)
HGB RETIC QN AUTO: 35 PG (ref 29–35)
IMM GRANULOCYTES # BLD AUTO: 0 K/UL (ref 0–0.5)
IMM GRANULOCYTES NFR BLD AUTO: 0 % (ref 0–5)
IMM RETICS NFR: 22.6 % (ref 3–15.9)
IRON SATN MFR SERPL: 35 %
IRON SERPL-MCNC: 133 UG/DL (ref 35–150)
LYMPHOCYTES # BLD: 1.7 K/UL (ref 0.5–4.6)
LYMPHOCYTES NFR BLD: 24 % (ref 13–44)
MCH RBC QN AUTO: 28.4 PG (ref 26.1–32.9)
MCHC RBC AUTO-ENTMCNC: 30.9 G/DL (ref 31.4–35)
MCV RBC AUTO: 92.1 FL (ref 79.6–97.8)
MONOCYTES # BLD: 0.2 K/UL (ref 0.1–1.3)
MONOCYTES NFR BLD: 3 % (ref 4–12)
NEUTS SEG # BLD: 4.6 K/UL (ref 1.7–8.2)
NEUTS SEG NFR BLD: 64 % (ref 43–78)
NRBC # BLD: 0 K/UL (ref 0–0.2)
PLATELET # BLD AUTO: 224 K/UL (ref 150–450)
PLATELET COMMENTS,PCOM: ADEQUATE
PMV BLD AUTO: 9.7 FL (ref 9.4–12.3)
POTASSIUM SERPL-SCNC: 3.7 MMOL/L (ref 3.5–5.1)
PROT SERPL-MCNC: 6.1 G/DL (ref 6.3–8.2)
RBC # BLD AUTO: 3.41 M/UL (ref 4.05–5.25)
RBC MORPH BLD: ABNORMAL
RETICS # AUTO: 0.09 M/UL (ref 0.03–0.1)
RETICS/RBC NFR AUTO: 2.6 % (ref 0.3–2)
SODIUM SERPL-SCNC: 140 MMOL/L (ref 136–145)
TIBC SERPL-MCNC: 384 UG/DL (ref 250–450)
WBC # BLD AUTO: 7.2 K/UL (ref 4.3–11.1)
WBC MORPH BLD: ABNORMAL

## 2019-02-26 PROCEDURE — 85046 RETICYTE/HGB CONCENTRATE: CPT

## 2019-02-26 PROCEDURE — 80053 COMPREHEN METABOLIC PANEL: CPT

## 2019-02-26 PROCEDURE — 36415 COLL VENOUS BLD VENIPUNCTURE: CPT

## 2019-02-26 PROCEDURE — 82728 ASSAY OF FERRITIN: CPT

## 2019-02-26 PROCEDURE — 83540 ASSAY OF IRON: CPT

## 2019-02-26 PROCEDURE — 85025 COMPLETE CBC W/AUTO DIFF WBC: CPT

## 2019-02-28 ENCOUNTER — PATIENT OUTREACH (OUTPATIENT)
Dept: OTHER | Age: 34
End: 2019-02-28

## 2019-02-28 NOTE — PROGRESS NOTES
Final call made today. Contacted patient for transitions of care services. Verified  and address for HIPAA security. *Spoke with patient who reports that she is doing ok. Still having vaginal bleeding and remains on bedrest.  
 
*Patient states the she still getting weekly infusions. 
  -Still experiencing Migaine HA's - taking Fioricet which helps sometimes. Offered continued CM support (HPRP) - patient states that she is doing well  enough with attending appointments and does not think  that additional support is not needed at this time. Patient had no other questions or concerns. Contact information provided and reminded her that I can be reached if any needs arise in the future. Aman Morton Resolving current episode 19(Transitions of care complete). No further ED/UC or hospital admissions within 30 days post discharge. Patient attended follow-up appointments as directed. No outreach from patient to [address] [place] [address].

## 2019-03-06 ENCOUNTER — PATIENT OUTREACH (OUTPATIENT)
Dept: OTHER | Age: 34
End: 2019-03-06

## 2019-03-06 NOTE — PROGRESS NOTES
Received notification of inpatient admission on 19 to [facility];  Care Manager contacted the patient, verified  and zip code as identifiers.  Provided introduction and explanation of purpose of call;  States she remains in the hospital indefinitely at this time;  Patient states \"l have a large hemorrhage of the placenta inside of my womb\" and continues with vaginal bleeding;  States she is anemic from the blood loss and is receiving iron supplementation;  Agrees to FU calls to check in with her occasionally and to provide support as needed;  Remains inpatient at this time;   Plan to FU in one week to check on status on mom and baby;

## 2019-03-12 ENCOUNTER — PATIENT OUTREACH (OUTPATIENT)
Dept: OTHER | Age: 34
End: 2019-03-12

## 2019-03-12 NOTE — PROGRESS NOTES
RONAK follow up. * Covering for CM Armani Wills RN. Telephone attempt to contact patient for Transitions of Care. Left discreet message on voicemail with this CC contact information. Will inform CM was unable to reach.

## 2019-03-19 ENCOUNTER — PATIENT OUTREACH (OUTPATIENT)
Dept: OTHER | Age: 34
End: 2019-03-19

## 2019-03-26 ENCOUNTER — PATIENT OUTREACH (OUTPATIENT)
Dept: OTHER | Age: 34
End: 2019-03-26

## 2019-03-26 NOTE — PROGRESS NOTES
Patient identified as eligible for [address] [place] [address] Management services. Numerous outreach attempts. Left discreet voicemail with this CM confidential contact information. Will send final UTR letter. Resolving current episode for case management due to patient unable to reach. Patient has not been reached after repeated calls and letters. Letter sent to patient notifying completion of services due to unable to reach. This writer's contact information and information regarding program services included in materials sent.

## 2019-03-26 NOTE — LETTER
Ms. Giovanni Douglas [address] [place] [address] Dear Ms. Surjit Acosta, My name is Paola Nair RN, Employee Care Manager for [facility], and I have been trying to reach you. The Employee Care Management [facility]) program is a free-of-charge, confidential service provided to our employees and their family members covered by the [facility]. I can help you with care transitions such as when you come home from the hospital, when help is needed to manage your disease, or when you need assistance coordinating services or appointments. [facility] now partners with [facility]. If you are a qualifying employee, you may receive an additional 10 wellness incentive points for every month of active participation with an Employee Care Manager. As healthcare providers, we know that patients do better when they have close follow up with a primary care provider (PCP). I can help you find one that is convenient to you and covered by your insurance. I can also help you understand any after visit instructions, such as what symptoms to watch out for, or any new or changed medications. We can work together using your preferred communication -- telephone, email, [employer]. If you do not have a [employer] account, I can help you request access. Our program is designed to provide you with the opportunity to have a [facility] care manager partner with you for your healthcare needs. Due to not being able to reach you, I am closing out the current program, but will remain available to you should you have any questions. Please contact me at the below number if I can provide you with assistance for any of the above services. Sincerely, Paola Nair RN, BSN  Employee Care Manager [email or web address] [email or web address]

## 2019-11-15 ENCOUNTER — HOSPITAL ENCOUNTER (OUTPATIENT)
Dept: LAB | Age: 34
Discharge: HOME | End: 2019-11-15
Payer: COMMERCIAL

## 2019-11-15 PROCEDURE — 36415 COLL VENOUS BLD VENIPUNCTURE: CPT

## 2019-11-15 PROCEDURE — 82785 ASSAY OF IGE: CPT

## 2019-11-22 LAB
Lab: NORMAL
REFERENCE LAB,REFLB: NORMAL
TEST DESCRIPTION:,ATST: NORMAL

## 2019-11-24 NOTE — PROGRESS NOTES
Please let patient know she needs follow-up appointment to review allergic testing. There are follow-up studies I would consider depending upon how she is doing. She missed her last appt with Areliinga Huynhs. Please re-schedule. Areli Clubs, if she sees you, consider ANCA and ABPA evaluation if not completed. Thanks.   Laisha Johnson MD

## 2019-12-05 PROBLEM — J30.9 ALLERGIC RHINITIS: Status: ACTIVE | Noted: 2019-12-05

## 2019-12-05 PROBLEM — O09.212: Status: RESOLVED | Noted: 2018-11-07 | Resolved: 2019-12-05

## 2019-12-05 PROBLEM — E66.9 OBESITY (BMI 30.0-34.9): Status: ACTIVE | Noted: 2019-02-26

## 2020-02-18 ENCOUNTER — HOSPITAL ENCOUNTER (EMERGENCY)
Age: 35
Discharge: HOME | End: 2020-02-18
Attending: STUDENT IN AN ORGANIZED HEALTH CARE EDUCATION/TRAINING PROGRAM
Payer: COMMERCIAL

## 2020-02-18 ENCOUNTER — APPOINTMENT (OUTPATIENT)
Dept: CT IMAGING | Age: 35
End: 2020-02-18
Attending: STUDENT IN AN ORGANIZED HEALTH CARE EDUCATION/TRAINING PROGRAM
Payer: COMMERCIAL

## 2020-02-18 ENCOUNTER — APPOINTMENT (OUTPATIENT)
Dept: GENERAL RADIOLOGY | Age: 35
End: 2020-02-18
Attending: STUDENT IN AN ORGANIZED HEALTH CARE EDUCATION/TRAINING PROGRAM
Payer: COMMERCIAL

## 2020-02-18 VITALS
TEMPERATURE: 98 F | WEIGHT: 170 LBS | SYSTOLIC BLOOD PRESSURE: 104 MMHG | DIASTOLIC BLOOD PRESSURE: 61 MMHG | BODY MASS INDEX: 32.1 KG/M2 | OXYGEN SATURATION: 98 % | HEIGHT: 61 IN | HEART RATE: 72 BPM | RESPIRATION RATE: 19 BRPM

## 2020-02-18 DIAGNOSIS — R51.9 NONINTRACTABLE HEADACHE, UNSPECIFIED CHRONICITY PATTERN, UNSPECIFIED HEADACHE TYPE: ICD-10-CM

## 2020-02-18 DIAGNOSIS — R56.9 SEIZURE (HCC): Primary | ICD-10-CM

## 2020-02-18 LAB
ALBUMIN SERPL-MCNC: 3.8 G/DL (ref 3.5–5)
ALBUMIN/GLOB SERPL: 1 {RATIO} (ref 1.2–3.5)
ALP SERPL-CCNC: 54 U/L (ref 50–130)
ALT SERPL-CCNC: 18 U/L (ref 12–65)
ANION GAP SERPL CALC-SCNC: 8 MMOL/L (ref 7–16)
AST SERPL-CCNC: 18 U/L (ref 15–37)
ATRIAL RATE: 80 BPM
BASOPHILS # BLD: 0.1 K/UL (ref 0–0.2)
BASOPHILS NFR BLD: 1 % (ref 0–2)
BILIRUB SERPL-MCNC: 0.2 MG/DL (ref 0.2–1.1)
BUN SERPL-MCNC: 17 MG/DL (ref 6–23)
CALCIUM SERPL-MCNC: 8.8 MG/DL (ref 8.3–10.4)
CALCULATED P AXIS, ECG09: 48 DEGREES
CALCULATED R AXIS, ECG10: 40 DEGREES
CALCULATED T AXIS, ECG11: 32 DEGREES
CHLORIDE SERPL-SCNC: 108 MMOL/L (ref 98–107)
CO2 SERPL-SCNC: 24 MMOL/L (ref 21–32)
CREAT SERPL-MCNC: 0.95 MG/DL (ref 0.6–1)
DIAGNOSIS, 93000: NORMAL
DIFFERENTIAL METHOD BLD: ABNORMAL
EOSINOPHIL # BLD: 0.6 K/UL (ref 0–0.8)
EOSINOPHIL NFR BLD: 8 % (ref 0.5–7.8)
ERYTHROCYTE [DISTWIDTH] IN BLOOD BY AUTOMATED COUNT: 13.8 % (ref 11.9–14.6)
GLOBULIN SER CALC-MCNC: 3.7 G/DL (ref 2.3–3.5)
GLUCOSE SERPL-MCNC: 85 MG/DL (ref 65–100)
HCT VFR BLD AUTO: 37.7 % (ref 35.8–46.3)
HGB BLD-MCNC: 11.8 G/DL (ref 11.7–15.4)
IMM GRANULOCYTES # BLD AUTO: 0 K/UL (ref 0–0.5)
IMM GRANULOCYTES NFR BLD AUTO: 0 % (ref 0–5)
LYMPHOCYTES # BLD: 2.3 K/UL (ref 0.5–4.6)
LYMPHOCYTES NFR BLD: 33 % (ref 13–44)
MAGNESIUM SERPL-MCNC: 2.4 MG/DL (ref 1.8–2.4)
MCH RBC QN AUTO: 27 PG (ref 26.1–32.9)
MCHC RBC AUTO-ENTMCNC: 31.3 G/DL (ref 31.4–35)
MCV RBC AUTO: 86.3 FL (ref 79.6–97.8)
MONOCYTES # BLD: 0.4 K/UL (ref 0.1–1.3)
MONOCYTES NFR BLD: 6 % (ref 4–12)
NEUTS SEG # BLD: 3.5 K/UL (ref 1.7–8.2)
NEUTS SEG NFR BLD: 52 % (ref 43–78)
NRBC # BLD: 0 K/UL (ref 0–0.2)
P-R INTERVAL, ECG05: 134 MS
PLATELET # BLD AUTO: 294 K/UL (ref 150–450)
PMV BLD AUTO: 9.6 FL (ref 9.4–12.3)
POTASSIUM SERPL-SCNC: 3.8 MMOL/L (ref 3.5–5.1)
PROT SERPL-MCNC: 7.5 G/DL (ref 6.3–8.2)
Q-T INTERVAL, ECG07: 386 MS
QRS DURATION, ECG06: 76 MS
QTC CALCULATION (BEZET), ECG08: 445 MS
RBC # BLD AUTO: 4.37 M/UL (ref 4.05–5.2)
SODIUM SERPL-SCNC: 140 MMOL/L (ref 136–145)
VENTRICULAR RATE, ECG03: 80 BPM
WBC # BLD AUTO: 6.8 K/UL (ref 4.3–11.1)

## 2020-02-18 PROCEDURE — 85025 COMPLETE CBC W/AUTO DIFF WBC: CPT

## 2020-02-18 PROCEDURE — 99285 EMERGENCY DEPT VISIT HI MDM: CPT

## 2020-02-18 PROCEDURE — 70450 CT HEAD/BRAIN W/O DYE: CPT

## 2020-02-18 PROCEDURE — 96374 THER/PROPH/DIAG INJ IV PUSH: CPT

## 2020-02-18 PROCEDURE — 83735 ASSAY OF MAGNESIUM: CPT

## 2020-02-18 PROCEDURE — 81003 URINALYSIS AUTO W/O SCOPE: CPT

## 2020-02-18 PROCEDURE — 71046 X-RAY EXAM CHEST 2 VIEWS: CPT

## 2020-02-18 PROCEDURE — 93005 ELECTROCARDIOGRAM TRACING: CPT | Performed by: STUDENT IN AN ORGANIZED HEALTH CARE EDUCATION/TRAINING PROGRAM

## 2020-02-18 PROCEDURE — 74011250636 HC RX REV CODE- 250/636: Performed by: STUDENT IN AN ORGANIZED HEALTH CARE EDUCATION/TRAINING PROGRAM

## 2020-02-18 PROCEDURE — 96375 TX/PRO/DX INJ NEW DRUG ADDON: CPT

## 2020-02-18 PROCEDURE — 80053 COMPREHEN METABOLIC PANEL: CPT

## 2020-02-18 PROCEDURE — 74011250637 HC RX REV CODE- 250/637: Performed by: STUDENT IN AN ORGANIZED HEALTH CARE EDUCATION/TRAINING PROGRAM

## 2020-02-18 RX ORDER — HYDROCODONE BITARTRATE AND ACETAMINOPHEN 7.5; 325 MG/1; MG/1
1 TABLET ORAL
Status: COMPLETED | OUTPATIENT
Start: 2020-02-18 | End: 2020-02-18

## 2020-02-18 RX ORDER — ACETAMINOPHEN 500 MG
1000 TABLET ORAL
Status: COMPLETED | OUTPATIENT
Start: 2020-02-18 | End: 2020-02-18

## 2020-02-18 RX ORDER — BUTALBITAL, ASPIRIN, AND CAFFEINE 325; 50; 40 MG/1; MG/1; MG/1
1 CAPSULE ORAL
Qty: 20 CAP | Refills: 0 | Status: SHIPPED | OUTPATIENT
Start: 2020-02-18 | End: 2020-02-20

## 2020-02-18 RX ORDER — KETOROLAC TROMETHAMINE 30 MG/ML
30 INJECTION, SOLUTION INTRAMUSCULAR; INTRAVENOUS
Status: COMPLETED | OUTPATIENT
Start: 2020-02-18 | End: 2020-02-18

## 2020-02-18 RX ADMIN — SODIUM CHLORIDE 1000 ML: 900 INJECTION, SOLUTION INTRAVENOUS at 18:11

## 2020-02-18 RX ADMIN — HYDROCODONE BITARTRATE AND ACETAMINOPHEN 1 TABLET: 7.5; 325 TABLET ORAL at 20:14

## 2020-02-18 RX ADMIN — ACETAMINOPHEN 1000 MG: 500 TABLET, FILM COATED ORAL at 18:11

## 2020-02-18 RX ADMIN — KETOROLAC TROMETHAMINE 30 MG: 30 INJECTION, SOLUTION INTRAMUSCULAR at 19:18

## 2020-02-18 NOTE — ED TRIAGE NOTES
Per Ems:  pt's son witnessed possible seizure or syncopal episode. Pt does not remember eopisode. PT states Nausea, received 4mg zofran en route.

## 2020-02-19 NOTE — DISCHARGE INSTRUCTIONS
Patient Education   You should not drive until follow-up with the neurologist listed. Drink plenty of clear liquids to ensure hydration, eat regular meals and get plenty of sleep as discussed. Return immediately for worsening symptoms, concerns or questions. Seizure: Care Instructions  Your Care Instructions    Seizures are caused by abnormal patterns of electrical signals in the brain. They are different for each person. Seizures can affect movement, speech, vision, or awareness. Some people have only slight shaking of a hand and do not pass out. Other people may pass out and have violent shaking of the whole body. Some people appear to stare into space. They are awake, but they can't respond normally. Later, they may not remember what happened. You may need tests to identify the type and cause of the seizures. A seizure may occur only once, or you may have them more than one time. Taking medicines as directed and following up with your doctor may help keep you from having more seizures. The doctor has checked you carefully, but problems can develop later. If you notice any problems or new symptoms, get medical treatment right away. Follow-up care is a key part of your treatment and safety. Be sure to make and go to all appointments, and call your doctor if you are having problems. It's also a good idea to know your test results and keep a list of the medicines you take. How can you care for yourself at home? · Be safe with medicines. Take your medicines exactly as prescribed. Call your doctor if you think you are having a problem with your medicine. · Do not do any activity that could be dangerous to you or others until your doctor says it is safe to do so. For example, do not drive a car, operate machinery, swim, or climb ladders. · Be sure that anyone treating you for any health problem knows that you have had a seizure and what medicines you are taking for it.   · Identify and avoid things that may make you more likely to have a seizure. These may include lack of sleep, alcohol or drug use, stress, or not eating. · Make sure you go to your follow-up appointment. When should you call for help? Call 911 anytime you think you may need emergency care. For example, call if:    · You have another seizure.     · You have more than one seizure in 24 hours.     · You have new symptoms, such as trouble walking, speaking, or thinking clearly.    Call your doctor now or seek immediate medical care if:    · You are not acting normally.    Watch closely for changes in your health, and be sure to contact your doctor if you have any problems. Where can you learn more? Go to http://amelia-loni.info/. Enter U942 in the search box to learn more about \"Seizure: Care Instructions. \"  Current as of: March 28, 2019  Content Version: 12.2  © 8345-4190 [employer]. Care instructions adapted under license by [employer] (which disclaims liability or warranty for this information). If you have questions about a medical condition or this instruction, always ask your healthcare professional. [place] [address] any warranty or liability for your use of this information.

## 2020-02-19 NOTE — ED PROVIDER NOTES
46-year-old female patient presents with reports of syncopal episode. Patient states she was at home, addressing her child and does not remember what happened after that. She woke up with paramedics and EMS personnel on scene. Suspect she struck her head during the fall as she reports a headache at this time. She denies any significant neck pain or stiffness. There is no numbness tingling or weakness. She denies any changes in vision. Patient states she is been feeling generally well throughout the day but states she did not sleep well last night. Estimates approximately 1 hour of sleep with a full day of activity today. She reports a similar episode as a child but is unsure what caused her symptoms at that time. She is unable to recall many of the details associated with that episode as well as her episode today. Denies any recent nausea, vomiting, fever or chills. She reports no chest pain tightness or pressure. Denies any shortness of breath. Denies changes in bowel or bladder habits. No vaginal bleeding or discharge. Last menstrual cycle occurred approximately 2 weeks ago and was normal at that time. Past Medical History:   Diagnosis Date    Abnormal Papanicolaou smear of cervix     Allergic rhinitis     Alopecia areata     Dr Junior Nova: s/p steroid injection    Anemia, iron deficiency     Asthma dx age 25    last used rescue inhaler in 2018- better now- denies SOB with 1 flight of steps    Asthma exacerbation 2018    Current pregnancy with history of  labor, second trimester 2018    Elects NIPT-low risk NO GTT-due to history of gastric bypass.    See [address] Overview    Depression     Encounter for insertion of mirena IUD 2015    GERD (gastroesophageal reflux disease)     controlled with protonix    Headache     History of abnormal cervical Pap smear 2018    LSIL (low grade squamous intraepithelial lesion) on Pap smear 2014    Migraine     Nasal polyposis     PUD (peptic ulcer disease)     hx of GI ulcers since 2011    Recurrent cystitis     neg cx, renal ultrasound normal, s/p uro evla, deferred cysto, possible IC    Severe obesity ([facility] [address] 2/26/2019    SVT (supraventricular tachycardia) ([facility] [address] 12/04/2013    s/p echo (2013- normal); occasionally will have palpitations but denies any medications at this time    Symptomatic varicose veins of left lower extremity 3/28/2017    Thromboembolus [facility])     s/p infiltrated IV in arm    Ulcer 8/2012    revision of gastric bypass (excision of ulcer); managed with protonix BID    Vaginitis     Varicose veins of legs        Past Surgical History:   Procedure Laterality Date    ABDOMEN SURGERY PROC UNLISTED      ulcer removed    ABDOMEN SURGERY PROC UNLISTED  04/24/2018    lap ventral hernia     HX CHOLECYSTECTOMY  2011    HX GASTRIC BYPASS  08/2009     and 2012 was revision with excision ulcer    HX GYN      x2 vaginal birth   [facility] HX HERNIA REPAIR      HX LAP CHOLECYSTECTOMY  2010    HX OTHER SURGICAL  2010,7/2016    scraped sinuses/ulcer surgery    HX TONSILLECTOMY  2008    HX WISDOM TEETH EXTRACTION      SINUS SURGERY [address] UNLISTED  2008, 2017    Deviated septum         Family History:   Problem Relation Age of Onset    Thyroid Disease Mother         Graves Disease    Other Mother         varicose veins    No Known Problems Father     No Known Problems Brother     Thyroid Disease Maternal Grandfather     Lung Cancer Maternal Grandfather     Emphysema Paternal Grandmother     Breast Cancer Maternal Aunt     Other Maternal Aunt         vv    Other Other         Ulcers (unknown family member)    Thyroid Disease Son     Asthma Son     Alzheimer Paternal Grandfather        Social History     Socioeconomic History    Marital status: SINGLE     Spouse name: Not on file    Number of children: Not on file    Years of education: Not on file    Highest education level: Not on file   Occupational History    Not on file   Social Needs    Financial resource strain: Not on file    Food insecurity:     Worry: Not on file     Inability: Not on file    Transportation needs:     Medical: Not on file     Non-medical: Not on file   Tobacco Use    Smoking status: Never Smoker    Smokeless tobacco: Never Used   Substance and Sexual Activity    Alcohol use: No    Drug use: No    Sexual activity: Yes     Partners: Male     Birth control/protection: None   Lifestyle    Physical activity:     Days per week: Not on file     Minutes per session: Not on file    Stress: Not on file   Relationships    Social connections:     Talks on phone: Not on file     Gets together: Not on file     Attends Caodaism service: Not on file     Active member of club or organization: Not on file     Attends meetings of clubs or organizations: Not on file     Relationship status: Not on file    Intimate partner violence:     Fear of current or ex partner: Not on file     Emotionally abused: Not on file     Physically abused: Not on file     Forced sexual activity: Not on file   Other Topics Concern    Not on file   Social History Narrative    Has 2 children    [address]: Lactose; Levaquin [levofloxacin]; and Penicillins    Review of Systems   Constitutional: Negative for chills, diaphoresis and fever. HENT: Negative for congestion, sneezing and sore throat. Eyes: Negative for visual disturbance. Respiratory: Negative for cough, chest tightness, shortness of breath and wheezing. Cardiovascular: Negative for chest pain and leg swelling. Gastrointestinal: Negative for abdominal pain, blood in stool, diarrhea, nausea and vomiting. Endocrine: Negative for polyuria. Genitourinary: Negative for difficulty urinating, dysuria, flank pain, hematuria and urgency. Musculoskeletal: Negative for back pain, myalgias, neck pain and neck stiffness.    Skin: Negative for color change and rash. Neurological: Positive for dizziness, syncope and headaches. Negative for speech difficulty, weakness, light-headedness and numbness. Psychiatric/Behavioral: Negative for behavioral problems. All other systems reviewed and are negative. Vitals:    02/18/20 1734 02/18/20 1735 02/18/20 1810   BP: 99/60 99/60 102/67   Pulse: 79 80 72   Resp: 12 10 19   Temp: 98.2 °F (36.8 °C)     SpO2: 100% 100% 99%   Weight: 77.1 kg (170 lb)     Height: 5' 1\" (1.549 m)              Physical Exam  Vitals signs and nursing note reviewed. Constitutional:       General: She is not in acute distress. Appearance: She is well-developed. She is not diaphoretic. Comments: Alert and oriented to person place and time. No acute distress, speaks in clear, fluid sentences. HENT:      Head: Normocephalic and atraumatic. Comments: No evidence of trauma about the head or face. No hemotympanum or johnson sign. Patient reports some discomfort in her tongue. There is an abrasion to the distal aspect of the tongue. Right Ear: External ear normal.      Left Ear: External ear normal.      Nose: Nose normal.   Eyes:      Pupils: Pupils are equal, round, and reactive to light. Neck:      Musculoskeletal: Normal range of motion. Cardiovascular:      Rate and Rhythm: Normal rate and regular rhythm. Heart sounds: Normal heart sounds. No murmur. No friction rub. No gallop. Pulmonary:      Effort: Pulmonary effort is normal. No respiratory distress. Breath sounds: Normal breath sounds. No stridor. No decreased breath sounds, wheezing, rhonchi or rales. Chest:      Chest wall: No tenderness. Abdominal:      General: There is no distension. Palpations: Abdomen is soft. There is no mass. Tenderness: There is no abdominal tenderness. There is no guarding or rebound. Hernia: No hernia is present. Musculoskeletal: Normal range of motion. General: No tenderness or deformity. Skin:     General: Skin is warm and dry. Neurological:      Mental Status: She is alert and oriented to person, place, and time. Cranial Nerves: No cranial nerve deficit. MDM  Number of Diagnoses or Management Options  Diagnosis management comments: Family at bedside at this time. I was able to discuss the aftermath of patient's episode today. Family reportedly on scene approximately 10 minutes after the incident, reporting patient confused and slightly combative following the event. Given this report, abrasion of the tongue, I suspect patient may have had a seizure. He reports a slightly similar episode many months ago at which time patient was admitted to the hospital.  She was subsequently discharged without acute care because of her incident. Patient's laboratory testing and urinalysis including pregnancy test are unremarkable. CT imaging shows no acute abnormality as well as patient's chest x-ray. EKG obtained on arrival shows a normal with a rate of 80 beats a minute. There is no evidence of acute ischemia. Advised patient that she should not drive and we will arrange follow-up care with a neurologist for further evaluation. Voice dictation software was used during the making of this note. This software is not perfect and grammatical and other typographical errors may be present. This note has been proofread, but may still contain errors.   [address] Doctor Saji [facility]; 2/18/2020 @7:10 PM   ===================================================================         Amount and/or Complexity of Data Reviewed  Clinical lab tests: ordered and reviewed  Tests in the radiology section of CPT®: ordered and reviewed  Tests in the medicine section of CPT®: ordered and reviewed  Independent visualization of images, tracings, or specimens: yes    Risk of Complications, Morbidity, and/or Mortality  Presenting problems: moderate  Diagnostic procedures: low  Management options: moderate    Patient Progress  Patient progress: stable         Procedures

## 2020-02-19 NOTE — ED NOTES
I have reviewed discharge instructions with the patient. The patient verbalized understanding. Patient left ED via Discharge Method: ambulatory to Home with self. Opportunity for questions and clarification provided. Patient given 0 scripts. To continue your aftercare when you leave the hospital, you may receive an automated call from our care team to check in on how you are doing. This is a free service and part of our promise to provide the best care and service to meet your aftercare needs.  If you have questions, or wish to unsubscribe from this service please call [phone]. Thank you for Choosing our [facility] Emergency Department.

## 2020-04-08 PROBLEM — J45.50 SEVERE PERSISTENT ASTHMA: Status: ACTIVE | Noted: 2020-04-08

## 2020-08-07 PROBLEM — O09.92 HIGH-RISK PREGNANCY IN SECOND TRIMESTER: Status: RESOLVED | Noted: 2019-01-08 | Resolved: 2020-08-07

## 2020-08-07 PROBLEM — O20.0 THREATENED ABORTION: Status: RESOLVED | Noted: 2019-01-28 | Resolved: 2020-08-07

## 2020-08-07 PROBLEM — O99.842 PREGNANCY AFFECTED BY PREVIOUS BARIATRIC SURGERY, CURRENTLY IN SECOND TRIMESTER: Status: RESOLVED | Noted: 2018-10-26 | Resolved: 2020-08-07

## 2020-08-07 PROBLEM — O46.8X2 SUBCHORIONIC HEMORRHAGE IN SECOND TRIMESTER: Status: RESOLVED | Noted: 2018-11-07 | Resolved: 2020-08-07

## 2020-08-07 PROBLEM — O41.8X20 SUBCHORIONIC HEMORRHAGE IN SECOND TRIMESTER: Status: RESOLVED | Noted: 2018-11-07 | Resolved: 2020-08-07

## 2020-10-13 ENCOUNTER — HOSPITAL ENCOUNTER (OUTPATIENT)
Dept: LAB | Age: 35
Discharge: HOME | End: 2020-10-13
Payer: COMMERCIAL

## 2020-10-13 DIAGNOSIS — J32.4 CHRONIC PANSINUSITIS: ICD-10-CM

## 2020-10-13 PROCEDURE — 87070 CULTURE OTHR SPECIMN AEROBIC: CPT

## 2020-10-13 PROCEDURE — 87186 SC STD MICRODIL/AGAR DIL: CPT

## 2020-10-13 PROCEDURE — 87077 CULTURE AEROBIC IDENTIFY: CPT

## 2020-10-16 LAB
BACTERIA SPEC CULT: ABNORMAL
BACTERIA SPEC CULT: ABNORMAL
GRAM STN SPEC: ABNORMAL
GRAM STN SPEC: ABNORMAL
SERVICE CMNT-IMP: ABNORMAL

## 2020-11-14 ENCOUNTER — APPOINTMENT (OUTPATIENT)
Dept: CT IMAGING | Age: 35
End: 2020-11-14
Attending: EMERGENCY MEDICINE
Payer: COMMERCIAL

## 2020-11-14 ENCOUNTER — HOSPITAL ENCOUNTER (EMERGENCY)
Age: 35
Discharge: HOME | End: 2020-11-14
Attending: EMERGENCY MEDICINE
Payer: COMMERCIAL

## 2020-11-14 VITALS
OXYGEN SATURATION: 99 % | HEIGHT: 61 IN | WEIGHT: 189 LBS | TEMPERATURE: 98.7 F | BODY MASS INDEX: 35.68 KG/M2 | HEART RATE: 87 BPM | RESPIRATION RATE: 16 BRPM | DIASTOLIC BLOOD PRESSURE: 72 MMHG | SYSTOLIC BLOOD PRESSURE: 107 MMHG
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
## 2020-11-14 LAB
ANION GAP SERPL CALC-SCNC: 6 MMOL/L (ref 7–16)
BUN SERPL-MCNC: 14 MG/DL (ref 6–23)
CALCIUM SERPL-MCNC: 8.9 MG/DL (ref 8.3–10.4)
CHLORIDE SERPL-SCNC: 107 MMOL/L (ref 98–107)
CO2 SERPL-SCNC: 26 MMOL/L (ref 21–32)
CREAT SERPL-MCNC: 0.91 MG/DL (ref 0.6–1)
GLUCOSE SERPL-MCNC: 86 MG/DL (ref 65–100)
POTASSIUM SERPL-SCNC: 3.7 MMOL/L (ref 3.5–5.1)
SODIUM SERPL-SCNC: 139 MMOL/L (ref 136–145)

## 2020-11-14 PROCEDURE — 80048 BASIC METABOLIC PNL TOTAL CA: CPT

## 2020-11-14 PROCEDURE — 99283 EMERGENCY DEPT VISIT LOW MDM: CPT

## 2020-11-14 PROCEDURE — 74011250637 HC RX REV CODE- 250/637: Performed by: EMERGENCY MEDICINE

## 2020-11-14 PROCEDURE — 70450 CT HEAD/BRAIN W/O DYE: CPT

## 2020-11-14 RX ORDER — OXYCODONE HYDROCHLORIDE 5 MG/1
5 TABLET ORAL
Qty: 13 TAB | Refills: 0 | Status: SHIPPED | OUTPATIENT
Start: 2020-11-14 | End: 2020-11-17

## 2020-11-14 RX ORDER — HYDROCODONE BITARTRATE AND ACETAMINOPHEN 5; 325 MG/1; MG/1
1 TABLET ORAL ONCE
Status: COMPLETED | OUTPATIENT
Start: 2020-11-14 | End: 2020-11-14

## 2020-11-14 RX ORDER — OXYCODONE HYDROCHLORIDE 5 MG/1
5 TABLET ORAL
Status: COMPLETED | OUTPATIENT
Start: 2020-11-14 | End: 2020-11-14

## 2020-11-14 RX ADMIN — OXYCODONE HYDROCHLORIDE 5 MG: 5 TABLET ORAL at 18:27

## 2020-11-14 RX ADMIN — HYDROCODONE BITARTRATE AND ACETAMINOPHEN 1 TABLET: 5; 325 TABLET ORAL at 19:32

## 2020-11-14 NOTE — ED PROVIDER NOTES
61-year-old lady presents with concerns about having had a witnessed seizure. She said that she was [place] at a line park for a kids birthday party. Witnesses reported to her on her mother that she was complaining of a headache, grabbed her head, and fell and had a seizure. Patient had 1 previous seizure in February and she has been seen and evaluated by Dr. Suad Bryant. She had a 24-hour EEG performed about 2 weeks ago and was told that she had a nonepileptic seizure disorder. She has been on gabapentin and she notes that her neurologist decreased the gabapentin a few days ago. She reports no other symptoms including no fevers or chills. No cough or shortness of breath. Elements of this note were created using speech recognition software. As such, errors of speech recognition may be present. Past Medical History:   Diagnosis Date    Abnormal Papanicolaou smear of cervix     Allergic rhinitis     Alopecia areata     Dr Barbara Sanchez: s/p steroid injection    Anemia, iron deficiency     Asthma dx age 25    last used rescue inhaler in 2018- better now- denies SOB with 1 flight of steps    Asthma affecting pregnancy in second trimester 2016    albuterol inhaler as needed 2019 at [facility]:  Continues to take Albuterol prn. 2019 at [facility]:  Stable on Albuterol prn. See High Risk Pregnancy Overview    Asthma exacerbation 2018    Current pregnancy with history of  labor, second trimester 2018    Elects NIPT-low risk NO GTT-due to history of gastric bypass. See [facility] Overview    Depression     Encounter for insertion of mirena IUD 2015    GERD (gastroesophageal reflux disease)     controlled with protonix    Headache     High-risk pregnancy in second trimester 2018 at [facility]: Normal NT, 1.3 mm, Nasal bone seen. Subchorionic hemorrhage currently 2.1 x 4.2 x 3.4 and 3.6 x 1.3 x 3.4.   Panorama drawn today---> Low risk, female 2019 at [facility]:  Normal early anatomy. Single large [address] still present, still with bright red bleeding. Very long cervix. I reassured patient that high chance to resolve, no treatment except bedrest. 2019 at [facility]:  Normal     History of abnormal cervical Pap smear 2018    LSIL (low grade squamous intraepithelial lesion) on Pap smear 2014    Migraine     Nasal polyposis     Pregnancy affected by previous bariatric surgery, currently in second trimester 10/26/2018    History Gastric Bypass in  No GTT. See High Risk Pregnancy Overview    PUD (peptic ulcer disease)     hx of GI ulcers since     Recurrent cystitis     neg cx, renal ultrasound normal, s/p uro evla, deferred cysto, possible IC    Severe obesity ([facility] [address] 2019    Subchorionic hemorrhage in second trimester 2018 at [facility]:  [address] at internal os resolved. [address] right lateral measuring 7.01 x 4.20 x 6.91 cm. Reports heavy bleeding on ; using 1 pad/hr. Still having bright red bleeding today and wearing a pad along with \"menstral-like\" cramping. 2019 at [facility]:  CL 4.8 cm. [address] measuring 6.2 x 7.3 x 4.2 cm; stable.   Reports two days ago had heavy bleeding (saturated 4 pads over night) and passed    SVT (supraventricular tachycardia) ([address] 2013    s/p echo (- normal); occasionally will have palpitations but denies any medications at this time    Symptomatic varicose veins of left lower extremity 3/28/2017    Threatened  2019    Thromboembolus [facility])     s/p infiltrated IV in arm    Ulcer 2012    revision of gastric bypass (excision of ulcer); managed with protonix BID    Vaginitis     Varicose veins of legs        Past Surgical History:   Procedure Laterality Date    ABDOMEN SURGERY PROC UNLISTED      ulcer removed    ABDOMEN SURGERY PROC UNLISTED  2018    lap ventral hernia     HX CHOLECYSTECTOMY      HX GASTRIC BYPASS  2009     and  was revision with excision ulcer    HX GYN      x2 vaginal birth    HX HERNIA REPAIR      HX LAP CHOLECYSTECTOMY  2010    HX OTHER SURGICAL  2010,7/2016    scraped sinuses/ulcer surgery    HX TONSILLECTOMY  2008    HX WISDOM TEETH EXTRACTION      SINUS SURGERY PROC UNLISTED  2008, 2017    Deviated septum         Family History:   Problem Relation Age of Onset    Thyroid Disease Mother         Graves Disease    Other Mother         varicose veins    No Known Problems Father     No Known Problems Brother     Thyroid Disease Maternal Grandfather     Lung Cancer Maternal Grandfather     Emphysema Paternal Grandmother     Breast Cancer Maternal Aunt     Other Maternal Aunt         vv    Other Other         Ulcers (unknown family member)    Thyroid Disease Son     Asthma Son     Alzheimer Paternal Grandfather        Social History     Socioeconomic History    Marital status: SINGLE     Spouse name: Not on file    Number of children: Not on file    Years of education: Not on file    Highest education level: Not on file   Occupational History    Not on file   Social Needs    Financial resource strain: Not on file    Food insecurity     Worry: Not on file     Inability: Not on file    Transportation needs     Medical: Not on file     Non-medical: Not on file   Tobacco Use    Smoking status: Never Smoker    Smokeless tobacco: Never Used   Substance and Sexual Activity    Alcohol use: No    Drug use: No    Sexual activity: Yes     Partners: Male     Birth control/protection: None   Lifestyle    Physical activity     Days per week: Not on file     Minutes per session: Not on file    Stress: Not on file   Relationships    Social connections     Talks on phone: Not on file     Gets together: Not on file     Attends Restorationist service: Not on file     Active member of club or organization: Not on file     Attends meetings of clubs or organizations: Not on file     Relationship status: Not on file    Intimate partner violence     Fear of current or ex partner: Not on file     Emotionally abused: Not on file     Physically abused: Not on file     Forced sexual activity: Not on file   Other Topics Concern    Not on file   Social History Narrative    Has 2 children    [address]: Lactose; Levaquin [levofloxacin]; and Penicillins    Review of Systems   Constitutional: Negative for chills, diaphoresis and fever. HENT: Negative for congestion, rhinorrhea and sore throat. Eyes: Negative for redness and visual disturbance. Respiratory: Negative for cough, chest tightness, shortness of breath and wheezing. Cardiovascular: Negative for chest pain and palpitations. Gastrointestinal: Negative for abdominal pain, blood in stool, diarrhea, nausea and vomiting. Endocrine: Negative for polydipsia and polyuria. Genitourinary: Negative for dysuria and hematuria. Musculoskeletal: Negative for arthralgias, myalgias and neck stiffness. Allergic/Immunologic: Negative for environmental allergies and food allergies. Neurological: Positive for headaches. Negative for dizziness and weakness. Hematological: Negative for adenopathy. Does not bruise/bleed easily. Vitals:    11/14/20 1800 11/14/20 1802 11/14/20 1803   BP: 111/73  111/73   Resp:   16   Temp:   98.7 °F (37.1 °C)   SpO2:  99% 99%   Weight:   85.7 kg (189 lb)   Height:   5' 1\" (1.549 m)            Physical Exam  Vitals signs and nursing note reviewed. Constitutional:       Appearance: Normal appearance. HENT:      Head: Normocephalic and atraumatic. Cardiovascular:      Rate and Rhythm: Normal rate and regular rhythm. Pulmonary:      Effort: Pulmonary effort is normal.      Breath sounds: Normal breath sounds. Neurological:      General: No focal deficit present. Mental Status: She is alert and oriented to person, place, and time. Mental status is at baseline.           MDM  Number of Diagnoses or Management Options  Diagnosis management comments: Given her fall I will get a head CT scan. I will also check her electrolytes and kidney function. ED Course as of Nov 14 1921   Sat Nov 14, 2020 1919 CT scan is negative. Basic metabolic panel is unremarkable. I will discharge her home with a prescription for oxycodone.   We will give her a dose of hydrocodone in the ER as the initial dose of the oxycodone did not provide significant relief.    [AC]      ED Course User Index  [AC] Mary Nielson MD       Procedures

## 2020-11-14 NOTE — ED TRIAGE NOTES
Pt states she had a seizure witnessed by friends. PT states she was told she was  \"out for 2 to 3 to minuets\" and hit her head on a stone . Pt states HA x3 days prior to seizure. Pt c/o HA.

## 2020-11-15 NOTE — DISCHARGE INSTRUCTIONS
Return with any fevers, vomiting, difficulty breathing, worsening symptoms, or additional concerns. Follow-up with your neurologist.  Please call his office to make an appointment.

## 2020-11-15 NOTE — ED NOTES
I have reviewed discharge instructions with the parent. The parent verbalized understanding. Patient left ED via Discharge Method: ambulatory to Home with ( family, self,). Opportunity for questions and clarification provided. Patient given 1 scripts. To continue your aftercare when you leave the hospital, you may receive an automated call from our care team to check in on how you are doing. This is a free service and part of our promise to provide the best care and service to meet your aftercare needs.  If you have questions, or wish to unsubscribe from this service please call [phone]. Thank you for Choosing our [facility] Emergency Department.

## 2020-12-21 ENCOUNTER — HOSPITAL ENCOUNTER (OUTPATIENT)
Dept: GENERAL RADIOLOGY | Age: 35
Discharge: HOME | End: 2020-12-21

## 2020-12-21 DIAGNOSIS — Z20.822 ENCOUNTER FOR LABORATORY TESTING FOR COVID-19 VIRUS: ICD-10-CM

## 2020-12-21 DIAGNOSIS — R05.9 COUGH: ICD-10-CM

## 2021-04-20 RX ORDER — SODIUM CHLORIDE, SODIUM LACTATE, POTASSIUM CHLORIDE, CALCIUM CHLORIDE 600; 310; 30; 20 MG/100ML; MG/100ML; MG/100ML; MG/100ML
100 INJECTION, SOLUTION INTRAVENOUS CONTINUOUS
Status: CANCELLED | OUTPATIENT
Start: 2021-04-20

## 2021-04-21 ENCOUNTER — ANESTHESIA (OUTPATIENT)
Dept: ENDOSCOPY | Age: 36
End: 2021-04-21
Payer: COMMERCIAL

## 2021-04-21 ENCOUNTER — HOSPITAL ENCOUNTER (OUTPATIENT)
Age: 36
Setting detail: OUTPATIENT SURGERY
Discharge: HOME | End: 2021-04-21
Attending: INTERNAL MEDICINE | Admitting: INTERNAL MEDICINE
Payer: COMMERCIAL

## 2021-04-21 ENCOUNTER — ANESTHESIA EVENT (OUTPATIENT)
Dept: ENDOSCOPY | Age: 36
End: 2021-04-21
Payer: COMMERCIAL

## 2021-04-21 VITALS
TEMPERATURE: 97.6 F | WEIGHT: 180 LBS | HEART RATE: 73 BPM | BODY MASS INDEX: 33.99 KG/M2 | RESPIRATION RATE: 16 BRPM | HEIGHT: 61 IN | SYSTOLIC BLOOD PRESSURE: 107 MMHG | DIASTOLIC BLOOD PRESSURE: 66 MMHG | OXYGEN SATURATION: 100 %

## 2021-04-21 PROCEDURE — 74011000250 HC RX REV CODE- 250: Performed by: REGISTERED NURSE

## 2021-04-21 PROCEDURE — 76060000032 HC ANESTHESIA 0.5 TO 1 HR: Performed by: INTERNAL MEDICINE

## 2021-04-21 PROCEDURE — 76040000025: Performed by: INTERNAL MEDICINE

## 2021-04-21 PROCEDURE — 74011250636 HC RX REV CODE- 250/636: Performed by: REGISTERED NURSE

## 2021-04-21 PROCEDURE — 88305 TISSUE EXAM BY PATHOLOGIST: CPT

## 2021-04-21 PROCEDURE — 2709999900 HC NON-CHARGEABLE SUPPLY: Performed by: INTERNAL MEDICINE

## 2021-04-21 PROCEDURE — 77030021593 HC FCPS BIOP ENDOSC BSC -A: Performed by: INTERNAL MEDICINE

## 2021-04-21 PROCEDURE — 74011250636 HC RX REV CODE- 250/636: Performed by: ANESTHESIOLOGY

## 2021-04-21 RX ORDER — LIDOCAINE HYDROCHLORIDE 20 MG/ML
INJECTION, SOLUTION EPIDURAL; INFILTRATION; INTRACAUDAL; PERINEURAL AS NEEDED
Status: DISCONTINUED | OUTPATIENT
Start: 2021-04-21 | End: 2021-04-21 | Stop reason: HOSPADM

## 2021-04-21 RX ORDER — PROPOFOL 10 MG/ML
INJECTION, EMULSION INTRAVENOUS AS NEEDED
Status: DISCONTINUED | OUTPATIENT
Start: 2021-04-21 | End: 2021-04-21 | Stop reason: HOSPADM

## 2021-04-21 RX ORDER — SODIUM CHLORIDE, SODIUM LACTATE, POTASSIUM CHLORIDE, CALCIUM CHLORIDE 600; 310; 30; 20 MG/100ML; MG/100ML; MG/100ML; MG/100ML
100 INJECTION, SOLUTION INTRAVENOUS CONTINUOUS
Status: DISCONTINUED | OUTPATIENT
Start: 2021-04-21 | End: 2021-04-21 | Stop reason: HOSPADM

## 2021-04-21 RX ORDER — PROPOFOL 10 MG/ML
INJECTION, EMULSION INTRAVENOUS
Status: DISCONTINUED | OUTPATIENT
Start: 2021-04-21 | End: 2021-04-21 | Stop reason: HOSPADM

## 2021-04-21 RX ADMIN — PROPOFOL 140 MCG/KG/MIN: 10 INJECTION, EMULSION INTRAVENOUS at 10:36

## 2021-04-21 RX ADMIN — LIDOCAINE HYDROCHLORIDE 50 MG: 20 INJECTION, SOLUTION EPIDURAL; INFILTRATION; INTRACAUDAL; PERINEURAL at 10:36

## 2021-04-21 RX ADMIN — SODIUM CHLORIDE, SODIUM LACTATE, POTASSIUM CHLORIDE, AND CALCIUM CHLORIDE 100 ML/HR: 600; 310; 30; 20 INJECTION, SOLUTION INTRAVENOUS at 10:30

## 2021-04-21 RX ADMIN — PROPOFOL 50 MG: 10 INJECTION, EMULSION INTRAVENOUS at 10:36

## 2021-04-21 NOTE — PROCEDURES
EGD and Colonoscopy Procedure Note      Anesthesia/Sedation: MAC IV       Procedure Details:  Informed consent was obtained for the procedure, including sedation risk. Risks of pancreatitis, infection, perforation, hemorrhage, adverse drug reaction and aspiration were discussed. The EGD gastroscope was inserted into the mouth and advanced under direct vision to the second portion of the duodenum. A careful inspection was made as the gastroscope was withdrawn, including a retroflexed view of the proximal stomach; findings and interventions are described below. The patient was placed in the left lateral decubitus position. A rectal examination was performed. The adult colonoscope was inserted into the rectum and advanced under direct vision to the cecum. The quality of the colonic preparation was good. A careful inspection was made as the colonoscope was withdrawn, including a retroflexed view of the rectum; findings and interventions are described below. Findings:       EGD:  OROPHARYNX: Cords and pyriform recesses normal.    ESOPHAGUS: The esophagus is normal. The proximal, mid and distal portions are normal. The Z-Line is unremarkable. STOMACH: S/P R-en-Y gastrojejunostomy. Anastomotic ulcer noted. Clean based. No bleeding. JEJUNUM: The examined portion was unremarkable. Colonoscopy:  ANUS:  Anal exam reveals no masses or hemorrhoids, sphincter tone is normal.    RECTUM: Rectal exam reveals no masses or hemorrhoids. SIGMOID COLON: The mucosa is normal with good vascular pattern and without ulcers, diverticula or polyps. DESCENDING COLON: The mucosa is normal with good vascular pattern and without ulcers, diverticula or polyps. SPLENIC FLEXURE: The splenic flexure is normal.    TRANSVERSE COLON: The mucosa is normal with good vascular pattern and without ulcers, polyps  or diverticula.      HEPATIC FLEXURE: The hepatic flexure is normal.    ASCENDING COLON: The mucosa is normal with good vascular pattern and without ulcers, diverticula or polyps. Retroflexion performed with good views behind the folds. CECUM: The appendiceal orifice appears normal. The ileocecal valve appears normal.    TERMINAL ILEUM: The terminal ileum was entered is normal.    Random biopsies taken throughout the colon    Specimens:   1. Random colon biopsies     Estimated Blood Loss: 0-min cc           Complications:   None; patient tolerated the procedure well. Attending Attestation:  I performed the procedure. Impression:    1. Anastomotic ulcer   2. Unremarkable colonoscopy      Recommendations:  1. PPI/Carafate - may require revision as this does not appear to be healing with medical therapy  2. Follow up with primary GI  3.  Call office in 14 days for path report      Thai Panchal MD  [facility], [place]

## 2021-04-21 NOTE — ANESTHESIA PREPROCEDURE EVALUATION
Anesthetic History          Comments: Commonly wakes up from anesthesia shaking?      Review of Systems / Medical History  Patient summary reviewed and pertinent labs reviewed    Pulmonary            Asthma : well controlled       Neuro/Psych     seizures    Headaches and psychiatric history     Cardiovascular            Dysrhythmias (H/o SVT during pregnancy - no issues recently ) : SVT      Exercise tolerance: >4 METS  Comments: PVD   GI/Hepatic/Renal     GERD: well controlled      PUD    Comments: H/o gastric bypass Endo/Other        Obesity and anemia (chronic)     Other Findings              Physical Exam    Airway  Mallampati: I  TM Distance: 4 - 6 cm  Neck ROM: normal range of motion   Mouth opening: Normal     Cardiovascular    Rhythm: regular  Rate: normal         Dental  No notable dental hx       Pulmonary  Breath sounds clear to auscultation               Abdominal  GI exam deferred       Other Findings            Anesthetic Plan    ASA: 2  Anesthesia type: total IV anesthesia          Induction: Intravenous  Anesthetic plan and risks discussed with: Patient

## 2021-04-21 NOTE — DISCHARGE INSTRUCTIONS
Gastrointestinal Esophagogastroduodenoscopy (EGD) - Upper Exam Discharge Instructions    1. Call Dr. Chuck Scott at [phone] for any problems or questions. 2. Contact the doctor's office for follow up appointment as directed. 3. Medication may cause drowsiness for several hours, therefore:  · Do not drive or operate machinery for remainder of the day. · No alcohol today. · Do not make any important or legal decisions for 24 hours. · Do not sign any legal documents for 24 hours. 5. Ordinarily, you may resume regular diet and activity after exam unless otherwise              specified by your physician. 6. For mild soreness in your throat you may use Cepacol throat lozenges or warm               salt-water gargles as needed. Gastrointestinal Colonoscopy/Flexible Sigmoidoscopy - Lower Exam Discharge Instructions  1. Call Dr. Chuck Scott at [phone] for any problems or questions. 2. Contact the doctors office for follow up appointment as directed  3. Medication may cause drowsiness for several hours, therefore:  · Do not drive or operate machinery for reminder of the day. · No alcohol today. · Do not make any important or legal decisions for 24 hours. · Do not sign any legal documents for 24 hours. 4. Ordinarily, you may resume regular diet and activity after exam unless otherwise specified by your physician. 5. Because of air put into your colon during exam, you may experience some abdominal distension, relieved by the passage of gas, for several hours. 6. Contact your physician if you have any of the following:  · Excessive amount of bleeding - large amount when having a bowel movement. Occasional specks of blood with bowel movement would not be unusual.  · Severe abdominal pain  · Fever or Chills    Any additional instructions:    1. PPI/Carafate - ulcer may require revision as this does not appear to be healing with medical therapy. 2. Follow up with primary GI doctor.   3. Call office in 14 days for pathology report. Instructions given to Callum Arredondo and other family members.

## 2021-04-21 NOTE — H&P
History and Physical for Outpatient Procedures             Date: 2021     History of Present Illness:  Patient presents to undergo EGD and colonoscopy due to sx of N/V/D. Past Medical History:   Diagnosis Date    Adverse effect of anesthesia     per pt-- shakes upon awaking    Allergic rhinitis     Alopecia areata     Dr Delroy Cuenca: s/p steroid injection    Anemia, iron deficiency     Asthma     dx  age 25---- daily inhaler    Asthma affecting pregnancy in second trimester 2016    albuterol inhaler as needed 2019 at [facility]:  Continues to take Albuterol prn. 2019 at [facility]:  Stable on Albuterol prn.   See High Risk Pregnancy Overview    Asthma exacerbation 2018    Depression     Encounter for insertion of mirena IUD 2015    GERD (gastroesophageal reflux disease)     controlled with med    History of gastric bypass     initial wt loss 130lbs-- regained approx 20 lbs    Migraine     Nasal polyposis     PUD (peptic ulcer disease)     hx of GI ulcers since     Seizures ([address] onset 2020    last one 1 week ago-- followed by dr. Meng Late SVT (supraventricular tachycardia) ([address] 2013    per pt-- only during pregnancy-- no meds    Symptomatic varicose veins of left lower extremity 3/28/2017    Threatened  2019    Thromboembolus ([address] 2015    s/p infiltrated IV in arm     Past Surgical History:   Procedure Laterality Date    HX  SECTION  2019    HX GASTRIC BYPASS  2009     and 2012 was revision with excision ulcer    HX GYN      x2 vaginal birth    HX HERNIA REPAIR  2018    ventral    HX LAP CHOLECYSTECTOMY  2010    HX OTHER SURGICAL  ,2016    scraped sinuses/ulcer surgery    HX TONSILLECTOMY  2008    HX WISDOM TEETH EXTRACTION      [place] ABDOMEN SURGERY [address] UNLISTED      ulcer removed    [place] SINUS SURGERY PROC UNLISTED  ,     Deviated septum      Family History   Problem Relation Age of Onset    Thyroid Disease Mother         Graves Disease    Other Mother         varicose veins    No Known Problems Father     No Known Problems Brother     Thyroid Disease Maternal Grandfather     Lung Cancer Maternal Grandfather     Emphysema Paternal Grandmother     Breast Cancer Maternal Aunt     Other Maternal Aunt         vv    Other Other         Ulcers (unknown family member)    Thyroid Disease Son     Asthma Son     Alzheimer Paternal Grandfather      Social History     Tobacco Use    Smoking status: Never Smoker    Smokeless tobacco: Never Used   Substance Use Topics    Alcohol use: No        Allergies   Allergen Reactions    Lactose Diarrhea    Levaquin [Levofloxacin] Other (comments)     Chest/back pain    Penicillins Other (comments)     UTI sx per pt     No current facility-administered medications for this encounter. Review of Systems:  A detailed 10 organ review of systems is obtained with pertinent positives as listed in the History of Present Illness. All others are negative. Objective:     Physical Exam:  Visit Vitals  LMP 04/14/2021      General:  Alert and oriented. Heart: Regular rate and rhythm  Lungs:  Clear to auscultation bilaterally  Abdomen: Soft, nontender, nondistended    Impression/Plan:     Proceed with EGD and colonoscopy as planned. I have discussed with the patient the technique, benefits, alternatives, and risks of these procedures, including medication reaction, immediate or delayed bleeding, or perforation of the gastrointestinal tract.      Signed By: Kimi Enriquez MD     April 21, 2021

## 2021-04-26 NOTE — ANESTHESIA POSTPROCEDURE EVALUATION
Procedure(s):  ESOPHAGOGASTRODUODENOSCOPY (EGD)  COLONOSCOPY/ BMI 34  COLON BIOPSY.     total IV anesthesia    Anesthesia Post Evaluation        Patient location during evaluation: PACU  Patient participation: complete - patient participated  Level of consciousness: awake and alert  Pain management: adequate  Airway patency: patent  Anesthetic complications: no  Cardiovascular status: acceptable  Respiratory status: acceptable  Hydration status: acceptable  Post anesthesia nausea and vomiting:  controlled  Final Post Anesthesia Temperature Assessment:  Normothermia (36.0-37.5 degrees C)      INITIAL Post-op Vital signs:   Vitals Value Taken Time   /66 04/21/21 1134   Temp 36.4 °C (97.6 °F) 04/21/21 1105   Pulse 73 04/21/21 1134   Resp 16 04/21/21 1134   SpO2 100 % 04/21/21 1134

## 2021-05-03 ENCOUNTER — HOSPITAL ENCOUNTER (OUTPATIENT)
Dept: CT IMAGING | Age: 36
Discharge: HOME | End: 2021-05-03
Attending: INTERNAL MEDICINE
Payer: COMMERCIAL

## 2021-05-03 ENCOUNTER — TRANSCRIBE ORDER (OUTPATIENT)
Dept: SCHEDULING | Age: 36
End: 2021-05-03

## 2021-05-03 DIAGNOSIS — R10.84 GENERALIZED ABDOMINAL PAIN: ICD-10-CM

## 2021-05-03 DIAGNOSIS — R10.13 EPIGASTRIC PAIN: ICD-10-CM

## 2021-05-03 DIAGNOSIS — R10.13 EPIGASTRIC PAIN: Primary | ICD-10-CM

## 2021-05-03 PROCEDURE — 74160 CT ABDOMEN W/CONTRAST: CPT

## 2021-05-03 PROCEDURE — 74011000258 HC RX REV CODE- 258: Performed by: INTERNAL MEDICINE

## 2021-05-03 PROCEDURE — 74011000636 HC RX REV CODE- 636: Performed by: INTERNAL MEDICINE

## 2021-05-03 RX ORDER — SODIUM CHLORIDE 0.9 % (FLUSH) 0.9 %
10 SYRINGE (ML) INJECTION
Status: COMPLETED | OUTPATIENT
Start: 2021-05-03 | End: 2021-05-03

## 2021-05-03 RX ADMIN — SODIUM CHLORIDE 100 ML: 900 INJECTION, SOLUTION INTRAVENOUS at 16:00

## 2021-05-03 RX ADMIN — DIATRIZOATE MEGLUMINE AND DIATRIZOATE SODIUM 15 ML: 660; 100 LIQUID ORAL; RECTAL at 16:00

## 2021-05-03 RX ADMIN — IOPAMIDOL 100 ML: 755 INJECTION, SOLUTION INTRAVENOUS at 16:00

## 2021-05-03 RX ADMIN — Medication 10 ML: at 16:00

## 2021-08-29 ENCOUNTER — APPOINTMENT (OUTPATIENT)
Dept: GENERAL RADIOLOGY | Age: 36
End: 2021-08-29
Attending: EMERGENCY MEDICINE
Payer: COMMERCIAL

## 2021-08-29 ENCOUNTER — HOSPITAL ENCOUNTER (EMERGENCY)
Age: 36
Discharge: HOME | End: 2021-08-29
Attending: EMERGENCY MEDICINE
Payer: COMMERCIAL

## 2021-08-29 VITALS
HEIGHT: 62 IN | BODY MASS INDEX: 29.44 KG/M2 | OXYGEN SATURATION: 97 % | SYSTOLIC BLOOD PRESSURE: 105 MMHG | WEIGHT: 160 LBS | DIASTOLIC BLOOD PRESSURE: 59 MMHG | RESPIRATION RATE: 18 BRPM | TEMPERATURE: 98.7 F | HEART RATE: 93 BPM

## 2021-08-29 DIAGNOSIS — J45.41 MODERATE PERSISTENT ASTHMA WITH ACUTE EXACERBATION: Primary | ICD-10-CM

## 2021-08-29 LAB
ALBUMIN SERPL-MCNC: 3.6 G/DL (ref 3.5–5)
ALBUMIN/GLOB SERPL: 1 {RATIO} (ref 1.2–3.5)
ALP SERPL-CCNC: 53 U/L (ref 50–136)
ALT SERPL-CCNC: 19 U/L (ref 12–65)
ANION GAP SERPL CALC-SCNC: 5 MMOL/L (ref 7–16)
AST SERPL-CCNC: 15 U/L (ref 15–37)
BASOPHILS # BLD: 0.1 K/UL (ref 0–0.2)
BASOPHILS NFR BLD: 2 % (ref 0–2)
BILIRUB SERPL-MCNC: 0.2 MG/DL (ref 0.2–1.1)
BUN SERPL-MCNC: 10 MG/DL (ref 6–23)
CALCIUM SERPL-MCNC: 9 MG/DL (ref 8.3–10.4)
CHLORIDE SERPL-SCNC: 107 MMOL/L (ref 98–107)
CO2 SERPL-SCNC: 27 MMOL/L (ref 21–32)
COVID-19 RAPID TEST, COVR: NOT DETECTED
CREAT SERPL-MCNC: 0.76 MG/DL (ref 0.6–1)
DIFFERENTIAL METHOD BLD: ABNORMAL
EOSINOPHIL # BLD: 0.7 K/UL (ref 0–0.8)
EOSINOPHIL NFR BLD: 12 % (ref 0.5–7.8)
ERYTHROCYTE [DISTWIDTH] IN BLOOD BY AUTOMATED COUNT: 18.1 % (ref 11.9–14.6)
GLOBULIN SER CALC-MCNC: 3.6 G/DL (ref 2.3–3.5)
GLUCOSE SERPL-MCNC: 90 MG/DL (ref 65–100)
HCT VFR BLD AUTO: 32.3 % (ref 35.8–46.3)
HGB BLD-MCNC: 9.9 G/DL (ref 11.7–15.4)
IMM GRANULOCYTES # BLD AUTO: 0 K/UL (ref 0–0.5)
IMM GRANULOCYTES NFR BLD AUTO: 0 % (ref 0–5)
LYMPHOCYTES # BLD: 2.4 K/UL (ref 0.5–4.6)
LYMPHOCYTES NFR BLD: 41 % (ref 13–44)
MCH RBC QN AUTO: 23.6 PG (ref 26.1–32.9)
MCHC RBC AUTO-ENTMCNC: 30.7 G/DL (ref 31.4–35)
MCV RBC AUTO: 76.9 FL (ref 79.6–97.8)
MONOCYTES # BLD: 0.4 K/UL (ref 0.1–1.3)
MONOCYTES NFR BLD: 7 % (ref 4–12)
NEUTS SEG # BLD: 2.2 K/UL (ref 1.7–8.2)
NEUTS SEG NFR BLD: 38 % (ref 43–78)
NRBC # BLD: 0 K/UL (ref 0–0.2)
PLATELET # BLD AUTO: 354 K/UL (ref 150–450)
PMV BLD AUTO: 9.9 FL (ref 9.4–12.3)
POTASSIUM SERPL-SCNC: 3.2 MMOL/L (ref 3.5–5.1)
PROT SERPL-MCNC: 7.2 G/DL (ref 6.3–8.2)
RBC # BLD AUTO: 4.2 M/UL (ref 4.05–5.2)
SODIUM SERPL-SCNC: 139 MMOL/L (ref 136–145)
SOURCE, COVRS: NORMAL
WBC # BLD AUTO: 5.7 K/UL (ref 4.3–11.1)

## 2021-08-29 PROCEDURE — 96375 TX/PRO/DX INJ NEW DRUG ADDON: CPT

## 2021-08-29 PROCEDURE — 96366 THER/PROPH/DIAG IV INF ADDON: CPT

## 2021-08-29 PROCEDURE — U0005 INFEC AGEN DETEC AMPLI PROBE: HCPCS

## 2021-08-29 PROCEDURE — 94640 AIRWAY INHALATION TREATMENT: CPT

## 2021-08-29 PROCEDURE — 71046 X-RAY EXAM CHEST 2 VIEWS: CPT

## 2021-08-29 PROCEDURE — 94664 DEMO&/EVAL PT USE INHALER: CPT

## 2021-08-29 PROCEDURE — 74011250636 HC RX REV CODE- 250/636: Performed by: EMERGENCY MEDICINE

## 2021-08-29 PROCEDURE — 96365 THER/PROPH/DIAG IV INF INIT: CPT

## 2021-08-29 PROCEDURE — 80053 COMPREHEN METABOLIC PANEL: CPT

## 2021-08-29 PROCEDURE — 74011000250 HC RX REV CODE- 250: Performed by: EMERGENCY MEDICINE

## 2021-08-29 PROCEDURE — 87635 SARS-COV-2 COVID-19 AMP PRB: CPT

## 2021-08-29 PROCEDURE — 99284 EMERGENCY DEPT VISIT MOD MDM: CPT

## 2021-08-29 PROCEDURE — 85025 COMPLETE CBC W/AUTO DIFF WBC: CPT

## 2021-08-29 RX ORDER — HYDROCODONE BITARTRATE AND HOMATROPINE METHYLBROMIDE ORAL SOLUTION 5; 1.5 MG/5ML; MG/5ML
5 LIQUID ORAL
Qty: 45 ML | Refills: 0 | Status: SHIPPED | OUTPATIENT
Start: 2021-08-29 | End: 2021-09-01

## 2021-08-29 RX ORDER — OXYCODONE HYDROCHLORIDE 10 MG/1
TABLET ORAL
COMMUNITY
Start: 2021-08-20 | End: 2021-12-14

## 2021-08-29 RX ORDER — IPRATROPIUM BROMIDE AND ALBUTEROL SULFATE 2.5; .5 MG/3ML; MG/3ML
3 SOLUTION RESPIRATORY (INHALATION)
Status: COMPLETED | OUTPATIENT
Start: 2021-08-29 | End: 2021-08-29

## 2021-08-29 RX ORDER — PREDNISONE 5 MG/1
TABLET ORAL
Qty: 48 TABLET | Refills: 0 | Status: SHIPPED | OUTPATIENT
Start: 2021-08-29 | End: 2021-12-14

## 2021-08-29 RX ORDER — MAGNESIUM SULFATE HEPTAHYDRATE 40 MG/ML
2 INJECTION, SOLUTION INTRAVENOUS
Status: COMPLETED | OUTPATIENT
Start: 2021-08-29 | End: 2021-08-29

## 2021-08-29 RX ORDER — HYDROCODONE BITARTRATE AND HOMATROPINE METHYLBROMIDE 5; 1.5 MG/5ML; MG/5ML
SOLUTION ORAL
COMMUNITY
Start: 2021-08-10 | End: 2021-12-14

## 2021-08-29 RX ADMIN — IPRATROPIUM BROMIDE AND ALBUTEROL SULFATE 3 ML: .5; 3 SOLUTION RESPIRATORY (INHALATION) at 16:21

## 2021-08-29 RX ADMIN — METHYLPREDNISOLONE SODIUM SUCCINATE 125 MG: 125 INJECTION, POWDER, FOR SOLUTION INTRAMUSCULAR; INTRAVENOUS at 16:45

## 2021-08-29 RX ADMIN — MAGNESIUM SULFATE HEPTAHYDRATE 2 G: 40 INJECTION, SOLUTION INTRAVENOUS at 16:40

## 2021-08-29 RX ADMIN — IPRATROPIUM BROMIDE AND ALBUTEROL SULFATE 3 ML: .5; 3 SOLUTION RESPIRATORY (INHALATION) at 17:21

## 2021-08-29 NOTE — ED PROVIDER NOTES
The history is provided by the patient and the EMS personnel. Wheezing   This is a recurrent problem. The current episode started more than 2 days ago. The problem occurs constantly. The problem has been gradually worsening. Associated symptoms include vomiting, rhinorrhea and cough. Pertinent negatives include no chest pain, no fever, no abdominal pain, no diarrhea, no dysuria, no ear pain, no headaches, no neck pain and no rash. There was no precipitant for this problem. She has tried beta-agonist inhalers and leukotriene antagonists for the symptoms. She has had prior hospitalizations. She has had prior ED visits. Her past medical history is significant for asthma. Past Medical History:   Diagnosis Date    Adverse effect of anesthesia     per pt-- shakes upon awaking    Allergic rhinitis     Alopecia areata     Dr Koby Pacheco: s/p steroid injection    Anemia, iron deficiency     Asthma     dx  age 25---- daily inhaler    Asthma affecting pregnancy in second trimester 2016    albuterol inhaler as needed 2019 at [facility]:  Continues to take Albuterol prn. 2019 at [facility]:  Stable on Albuterol prn.   See High Risk Pregnancy Overview    Asthma exacerbation 2018    Depression     Encounter for insertion of mirena IUD 2015    GERD (gastroesophageal reflux disease)     controlled with med    History of gastric bypass     initial wt loss 130lbs-- regained approx 20 lbs    Migraine     Nasal polyposis     PUD (peptic ulcer disease)     hx of GI ulcers since     Seizures ([address] onset 2020    last one 1 week ago-- followed by dr. Marisel Morrissey SVT (supraventricular tachycardia) ([address] 2013    per pt-- only during pregnancy-- no meds    Symptomatic varicose veins of left lower extremity 3/28/2017    Threatened  2019    Thromboembolus ([address]     s/p infiltrated IV in arm       Past Surgical History:   Procedure Laterality Date    COLONOSCOPY N/A 4/21/2021    COLONOSCOPY/ BMI 34 performed by Modesto Collet, MD at [address]  03/2019    HX GASTRIC BYPASS  08/2009     and 2012 was revision with excision ulcer    HX GYN      x2 vaginal birth    HX HERNIA REPAIR  2018    ventral    HX LAP CHOLECYSTECTOMY  2010    HX OTHER SURGICAL  2010,7/2016    scraped sinuses/ulcer surgery    HX TONSILLECTOMY  2008    HX WISDOM TEETH EXTRACTION      [place] ABDOMEN SURGERY [address] UNLISTED      ulcer removed    [place] SINUS SURGERY PROC UNLISTED  2008, 2017    Deviated septum         Family History:   Problem Relation Age of Onset    Thyroid Disease Mother         Graves Disease    Other Mother         varicose veins    No Known Problems Father     No Known Problems Brother     Thyroid Disease Maternal Grandfather     Lung Cancer Maternal Grandfather     Emphysema Paternal Grandmother     Breast Cancer Maternal Aunt     Other Maternal Aunt         vv    Other Other         Ulcers (unknown family member)    Thyroid Disease Son     Asthma Son     Alzheimer Paternal Grandfather        Social History     Socioeconomic History    Marital status: SINGLE     Spouse name: Not on file    Number of children: Not on file    Years of education: Not on file    Highest education level: Not on file   Occupational History    Not on file   Tobacco Use    Smoking status: Never Smoker    Smokeless tobacco: Never Used   Vaping Use    Vaping Use: Never used   Substance and Sexual Activity    Alcohol use: No    Drug use: No    Sexual activity: Yes     Partners: Male   Other Topics Concern    Not on file   Social History Narrative    Has 2 children    [address] [facility]     Social Determinants of Health     Financial Resource Strain:     Difficulty of Paying Living Expenses:    Food Insecurity:     Worried About Running Out of Food in the Last Year:     [address] in the Last Year:    Transportation Needs:     Lack of Transportation (Medical):  Lack of Transportation (Non-Medical):    Physical Activity:     Days of Exercise per Week:     Minutes of Exercise per Session:    Stress:     Feeling of Stress :    Social Connections:     Frequency of Communication with Friends and Family:     Frequency of Social Gatherings with Friends and Family:     Attends [employer] Services:     Active Member of Clubs or Organizations:     Attends Club or Organization Meetings:     Marital Status:    Intimate Partner Violence:     Fear of Current or Ex-Partner:     Emotionally Abused:     Physically Abused:     Sexually Abused: ALLERGIES: Lactose, Levaquin [levofloxacin], and Penicillins    Review of Systems   Constitutional: Negative for chills and fever. HENT: Positive for rhinorrhea. Negative for congestion and ear pain. Eyes: Negative for photophobia and discharge. Respiratory: Positive for cough and wheezing. Negative for shortness of breath. Cardiovascular: Negative for chest pain and palpitations. Gastrointestinal: Positive for vomiting. Negative for abdominal pain, constipation and diarrhea. Endocrine: Negative for cold intolerance and heat intolerance. Genitourinary: Negative for dysuria and flank pain. Musculoskeletal: Negative for arthralgias, myalgias and neck pain. Skin: Negative for rash and wound. Allergic/Immunologic: Negative for environmental allergies and food allergies. Neurological: Negative for syncope and headaches. Hematological: Negative for adenopathy. Does not bruise/bleed easily. Psychiatric/Behavioral: Negative for dysphoric mood. The patient is not nervous/anxious. All other systems reviewed and are negative. Vitals:    08/29/21 1551   BP: 109/73   Pulse: 77   Resp: 21   Temp: 98.7 °F (37.1 °C)   SpO2: 98%   Weight: 72.6 kg (160 lb)   Height: 5' 2\" (1.575 m)            Physical Exam  Vitals and nursing note reviewed.    Constitutional:       General: She is in acute distress. Appearance: Normal appearance. She is well-developed and normal weight. HENT:      Head: Normocephalic and atraumatic. Right Ear: External ear normal.      Left Ear: External ear normal.      Mouth/Throat:      Mouth: Mucous membranes are moist.      Pharynx: Oropharynx is clear. No oropharyngeal exudate or posterior oropharyngeal erythema. Eyes:      Extraocular Movements: Extraocular movements intact. Conjunctiva/sclera: Conjunctivae normal.      Pupils: Pupils are equal, round, and reactive to light. Neck:      Vascular: No JVD. Cardiovascular:      Rate and Rhythm: Normal rate and regular rhythm. Pulses: Normal pulses. Heart sounds: Normal heart sounds. No murmur heard. No friction rub. No gallop. Pulmonary:      Effort: Pulmonary effort is normal.      Breath sounds: Decreased air movement present. Wheezing present. Abdominal:      General: Bowel sounds are normal. There is no distension. Palpations: Abdomen is soft. There is no mass. Tenderness: There is no abdominal tenderness. Musculoskeletal:         General: No deformity. Normal range of motion. Cervical back: Normal range of motion and neck supple. Skin:     General: Skin is warm and dry. Capillary Refill: Capillary refill takes less than 2 seconds. Findings: No rash. Neurological:      General: No focal deficit present. Mental Status: She is alert and oriented to person, place, and time. Cranial Nerves: No cranial nerve deficit. Sensory: No sensory deficit. Gait: Gait normal.   Psychiatric:         Mood and Affect: Mood normal.         Speech: Speech normal.         Behavior: Behavior normal.         Thought Content:  Thought content normal.         Judgment: Judgment normal.          MDM  Number of Diagnoses or Management Options  Moderate persistent asthma with acute exacerbation: established and worsening  Diagnosis management comments: 31year-old female presents with wheezing and shortness of breath. Patient reports a 3 to 4-day history of congestion subjective fevers with some nausea vomiting  Wheezing has worsened over the last 24 hours  Initially went to urgent care was given albuterol treatment there with only minimal improvement  Sent here to the ER for further evaluation    Given her preceding symptoms we will go ahead and check a rapid Covid  Check chest x-ray to rule out infiltrate    Start steroids and DuoNeb    6:30 PM  Patient feeling much better after treatment and steroids    Patient will be discharged  Close follow-up with her primary care doctor advised    Patient's controlled substance prescription records reviewed @ the [facility] [place]  [facility]ware website. Information will be utilized for accurate and appropriate patient care. Amount and/or Complexity of Data Reviewed  Clinical lab tests: ordered and reviewed  Tests in the radiology section of CPT®: ordered and reviewed  Tests in the medicine section of CPT®: ordered and reviewed  Decide to obtain previous medical records or to obtain history from someone other than the patient: yes  Review and summarize past medical records: yes  Independent visualization of images, tracings, or specimens: yes    Risk of Complications, Morbidity, and/or Mortality  Presenting problems: moderate  Diagnostic procedures: moderate  Management options: moderate  General comments: Elements of this note have been dictated via voice recognition software. Text and phrases may be limited by the accuracy of the software. The chart has been reviewed, but errors may still be present.       Patient Progress  Patient progress: improved         Procedures

## 2021-08-29 NOTE — ED TRIAGE NOTES
Pt. States she has been having cough ,congestion , and cold sx. X 4 days. She has also had body aches, N/V, and stomach ache.

## 2021-08-29 NOTE — DISCHARGE INSTRUCTIONS
Take medications as directed  Do not drink alcohol or drive while taking the prescription pain medications  Call your doctor/follow up doctor to set up appointment for recheck visit  Return to ER for any worsening symptoms or new problems which may arise

## 2021-08-29 NOTE — ED NOTES
I have reviewed discharge instructions with the patient. The patient verbalized understanding. Patient left ED via Discharge Method: ambulatory to Home with [facility]. [facility] for questions and clarification provided. Patient given 2 scripts. To continue your aftercare when you leave the hospital, you may receive an automated call from our care team to check in on how you are doing. This is a free service and part of our promise to provide the best care and service to meet your aftercare needs.  If you have questions, or wish to unsubscribe from this service please call [phone]. Thank you for Choosing our [facility].

## 2021-08-30 LAB
SARS-COV-2, COV2: NOT DETECTED
SPECIMEN SOURCE, FCOV2M: NORMAL

## 2022-01-28 ENCOUNTER — HOSPITAL ENCOUNTER (EMERGENCY)
Age: 37
Discharge: HOME | End: 2022-01-28
Attending: EMERGENCY MEDICINE
Payer: COMMERCIAL

## 2022-01-28 VITALS
SYSTOLIC BLOOD PRESSURE: 117 MMHG | OXYGEN SATURATION: 99 % | RESPIRATION RATE: 16 BRPM | DIASTOLIC BLOOD PRESSURE: 67 MMHG | WEIGHT: 170 LBS | TEMPERATURE: 97.6 F | HEIGHT: 62 IN | HEART RATE: 84 BPM | BODY MASS INDEX: 31.28 KG/M2

## 2022-01-28 DIAGNOSIS — R51.9 NONINTRACTABLE HEADACHE, UNSPECIFIED CHRONICITY PATTERN, UNSPECIFIED HEADACHE TYPE: Primary | ICD-10-CM

## 2022-01-28 LAB
ALBUMIN SERPL-MCNC: 3.9 G/DL (ref 3.5–5)
ALBUMIN/GLOB SERPL: 1 {RATIO} (ref 1.2–3.5)
ALP SERPL-CCNC: 53 U/L (ref 50–130)
ALT SERPL-CCNC: 21 U/L (ref 12–65)
ANION GAP SERPL CALC-SCNC: 2 MMOL/L (ref 7–16)
AST SERPL-CCNC: 20 U/L (ref 15–37)
BASOPHILS # BLD: 0.1 K/UL (ref 0–0.2)
BASOPHILS NFR BLD: 1 % (ref 0–2)
BILIRUB SERPL-MCNC: 0.3 MG/DL (ref 0.2–1.1)
BUN SERPL-MCNC: 14 MG/DL (ref 6–23)
CALCIUM SERPL-MCNC: 9.4 MG/DL (ref 8.3–10.4)
CHLORIDE SERPL-SCNC: 106 MMOL/L (ref 98–107)
CO2 SERPL-SCNC: 30 MMOL/L (ref 21–32)
CREAT SERPL-MCNC: 0.78 MG/DL (ref 0.6–1)
DIFFERENTIAL METHOD BLD: ABNORMAL
EOSINOPHIL # BLD: 0.5 K/UL (ref 0–0.8)
EOSINOPHIL NFR BLD: 6 % (ref 0.5–7.8)
ERYTHROCYTE [DISTWIDTH] IN BLOOD BY AUTOMATED COUNT: 17.7 % (ref 11.9–14.6)
GLOBULIN SER CALC-MCNC: 3.9 G/DL (ref 2.3–3.5)
GLUCOSE SERPL-MCNC: 118 MG/DL (ref 65–100)
HCG UR QL: NEGATIVE
HCT VFR BLD AUTO: 33.2 % (ref 35.8–46.3)
HGB BLD-MCNC: 10 G/DL (ref 11.7–15.4)
IMM GRANULOCYTES # BLD AUTO: 0 K/UL (ref 0–0.5)
IMM GRANULOCYTES NFR BLD AUTO: 0 % (ref 0–5)
LYMPHOCYTES # BLD: 2 K/UL (ref 0.5–4.6)
LYMPHOCYTES NFR BLD: 25 % (ref 13–44)
MAGNESIUM SERPL-MCNC: 2.2 MG/DL (ref 1.8–2.4)
MCH RBC QN AUTO: 22.3 PG (ref 26.1–32.9)
MCHC RBC AUTO-ENTMCNC: 30.1 G/DL (ref 31.4–35)
MCV RBC AUTO: 73.9 FL (ref 79.6–97.8)
MONOCYTES # BLD: 0.4 K/UL (ref 0.1–1.3)
MONOCYTES NFR BLD: 6 % (ref 4–12)
NEUTS SEG # BLD: 4.9 K/UL (ref 1.7–8.2)
NEUTS SEG NFR BLD: 63 % (ref 43–78)
NRBC # BLD: 0 K/UL (ref 0–0.2)
PLATELET # BLD AUTO: 454 K/UL (ref 150–450)
PMV BLD AUTO: 9.8 FL (ref 9.4–12.3)
POTASSIUM SERPL-SCNC: 3.9 MMOL/L (ref 3.5–5.1)
PROT SERPL-MCNC: 7.8 G/DL (ref 6.3–8.2)
RBC # BLD AUTO: 4.49 M/UL (ref 4.05–5.2)
SODIUM SERPL-SCNC: 138 MMOL/L (ref 136–145)
WBC # BLD AUTO: 7.9 K/UL (ref 4.3–11.1)

## 2022-01-28 PROCEDURE — 74011250637 HC RX REV CODE- 250/637: Performed by: NURSE PRACTITIONER

## 2022-01-28 PROCEDURE — 83735 ASSAY OF MAGNESIUM: CPT

## 2022-01-28 PROCEDURE — 96365 THER/PROPH/DIAG IV INF INIT: CPT

## 2022-01-28 PROCEDURE — 96375 TX/PRO/DX INJ NEW DRUG ADDON: CPT

## 2022-01-28 PROCEDURE — 74011250636 HC RX REV CODE- 250/636: Performed by: NURSE PRACTITIONER

## 2022-01-28 PROCEDURE — 81025 URINE PREGNANCY TEST: CPT

## 2022-01-28 PROCEDURE — 99284 EMERGENCY DEPT VISIT MOD MDM: CPT

## 2022-01-28 PROCEDURE — 85025 COMPLETE CBC W/AUTO DIFF WBC: CPT

## 2022-01-28 PROCEDURE — 80053 COMPREHEN METABOLIC PANEL: CPT

## 2022-01-28 RX ORDER — DEXAMETHASONE SODIUM PHOSPHATE 100 MG/10ML
10 INJECTION INTRAMUSCULAR; INTRAVENOUS
Status: COMPLETED | OUTPATIENT
Start: 2022-01-28 | End: 2022-01-28

## 2022-01-28 RX ORDER — BUTALBITAL, ASPIRIN, AND CAFFEINE 325; 50; 40 MG/1; MG/1; MG/1
1 CAPSULE ORAL
Qty: 18 CAPSULE | Refills: 0 | Status: SHIPPED | OUTPATIENT
Start: 2022-01-28 | End: 2022-01-31

## 2022-01-28 RX ORDER — DIPHENHYDRAMINE HYDROCHLORIDE 50 MG/ML
25 INJECTION, SOLUTION INTRAMUSCULAR; INTRAVENOUS
Status: COMPLETED | OUTPATIENT
Start: 2022-01-28 | End: 2022-01-28

## 2022-01-28 RX ORDER — METOCLOPRAMIDE HYDROCHLORIDE 5 MG/ML
10 INJECTION INTRAMUSCULAR; INTRAVENOUS
Status: COMPLETED | OUTPATIENT
Start: 2022-01-28 | End: 2022-01-28

## 2022-01-28 RX ORDER — MAGNESIUM SULFATE HEPTAHYDRATE 40 MG/ML
2 INJECTION, SOLUTION INTRAVENOUS
Status: COMPLETED | OUTPATIENT
Start: 2022-01-28 | End: 2022-01-28

## 2022-01-28 RX ORDER — ACETAMINOPHEN 500 MG
1000 TABLET ORAL
Status: COMPLETED | OUTPATIENT
Start: 2022-01-28 | End: 2022-01-28

## 2022-01-28 RX ORDER — KETOROLAC TROMETHAMINE 15 MG/ML
15 INJECTION, SOLUTION INTRAMUSCULAR; INTRAVENOUS
Status: COMPLETED | OUTPATIENT
Start: 2022-01-28 | End: 2022-01-28

## 2022-01-28 RX ADMIN — METOCLOPRAMIDE 10 MG: 5 INJECTION, SOLUTION INTRAMUSCULAR; INTRAVENOUS at 16:48

## 2022-01-28 RX ADMIN — ACETAMINOPHEN 1000 MG: 500 TABLET, FILM COATED ORAL at 19:13

## 2022-01-28 RX ADMIN — SODIUM CHLORIDE 1000 ML: 900 INJECTION, SOLUTION INTRAVENOUS at 16:57

## 2022-01-28 RX ADMIN — DIPHENHYDRAMINE HYDROCHLORIDE 25 MG: 50 INJECTION, SOLUTION INTRAMUSCULAR; INTRAVENOUS at 16:47

## 2022-01-28 RX ADMIN — KETOROLAC TROMETHAMINE 15 MG: 15 INJECTION, SOLUTION INTRAMUSCULAR; INTRAVENOUS at 18:59

## 2022-01-28 RX ADMIN — MAGNESIUM SULFATE HEPTAHYDRATE 2 G: 40 INJECTION, SOLUTION INTRAVENOUS at 16:50

## 2022-01-28 RX ADMIN — DEXAMETHASONE SODIUM PHOSPHATE 10 MG: 10 INJECTION INTRAMUSCULAR; INTRAVENOUS at 16:48

## 2022-01-28 NOTE — ED TRIAGE NOTES
Pt with headache and neck pain that began around noon. Pt took 4 tylenol. Pt with hx of migraines. Pt also with vomiting.

## 2022-01-28 NOTE — ED PROVIDER NOTES
HPI   42-year-old female presents to the emergency department with complaint of headache. States that pain is localized to the base of her skull, began approximately 4 hours prior to ED arrival, while laying her daughter down for a nap. Reports gradually worsening pain since onset. Reports history of migraine headaches, as well as seizure disorder, which she states is similar to this. States that she has photophobia to accompany the headache, sensitivity to light and slight dizziness; symptoms that she states are consistent with her regularly experience migraines. Follows with neurology. Does not take migraine medication. Reports compliance with seizure medication, though she cannot recall its name. Denies fall or other injury. Denies visual change, numbness/tingling/weakness, thunderclap onset. Denies nasal congestion or rhinorrhea, syncope or near syncope, fevers or chills, recent illness. Patient is alert and orient x3, hemodynamic stable, afebrile. She is nontoxic-appearing. Patient seated upright on side of exam room bed. Answers questions appropriately. Conversant with no increased effort. States she took 4 acetaminophen prior to arrival.  No other known therapeutic measures. Nothing else known to make worse or better      Past Medical History:   Diagnosis Date    Adverse effect of anesthesia     per pt-- shakes upon awaking    Allergic rhinitis     Alopecia areata 2015    Dr Ortiz Bulls: s/p steroid injection    Anemia, iron deficiency     Asthma     dx  age 25---- daily inhaler    Asthma affecting pregnancy in second trimester 6/4/2016    albuterol inhaler as needed 1/8/2019 at [facility]:  Continues to take Albuterol prn. 1/25/2019 at [facility]:  Stable on Albuterol prn.   See High Risk Pregnancy Overview    Asthma exacerbation 03/2018    Depression     Encounter for insertion of mirena IUD 12/17/2015    GERD (gastroesophageal reflux disease)     controlled with med    High-risk pregnancy in second trimester 2018 at [facility]: Normal NT, 1.3 mm, Nasal bone seen. Subchorionic hemorrhage currently 2.1 x 4.2 x 3.4 and 3.6 x 1.3 x 3.4. Panorama drawn today---> Low risk, female 2019 at [facility]:  Normal early anatomy. Single large [facility] still present, still with bright red bleeding. Very long cervix. I reassured patient that high chance to resolve, no treatment except bedrest. 2019 at [facility]:  Normal     High-risk pregnancy supervision 2013    History of gastric bypass     initial wt loss 130lbs-- regained approx 20 lbs    Migraine     Nasal polyposis     PUD (peptic ulcer disease)     hx of GI ulcers since     Seizures ([address] onset 2020    last one 1 week ago-- followed by dr. Tolu Marte hemorrhage in second trimester 2018 at [facility]:  [facility] at internal os resolved. [facility] right lateral measuring 7.01 x 4.20 x 6.91 cm. Reports heavy bleeding on ; using 1 pad/hr. Still having bright red bleeding today and wearing a pad along with \"menstral-like\" cramping. 2019 at [facility]:  CL 4.8 cm. [facility] measuring 6.2 x 7.3 x 4.2 cm; stable.   Reports two days ago had heavy bleeding (saturated 4 pads over night) and passed    Supervision of high risk pregnancy in second trimester 9/3/2013    SVT (supraventricular tachycardia) ([address] 2013    per pt-- only during pregnancy-- no meds    Symptomatic varicose veins of left lower extremity 3/28/2017    Threatened  2019    Thromboembolus ([address] 2015    s/p infiltrated IV in arm       Past Surgical History:   Procedure Laterality Date    COLONOSCOPY N/A 2021    COLONOSCOPY/ BMI 34 performed by Mora Shoemaker MD at [address]  2019    HX GASTRIC BYPASS  2009     and  was revision with excision ulcer    HX GYN      x2 vaginal birth    HX HERNIA REPAIR  2018    ventral    HX LAP CHOLECYSTECTOMY      HX OTHER SURGICAL  ,2016    scraped sinuses/ulcer surgery    HX TONSILLECTOMY  2008    HX WISDOM TEETH EXTRACTION      NC ABDOMEN SURGERY PROC UNLISTED      ulcer removed    NC SINUS SURGERY PROC UNLISTED  2008, 2017    Deviated septum         Family History:   Problem Relation Age of Onset    Thyroid Disease Mother         Graves Disease    Other Mother         varicose veins    No Known Problems Father     No Known Problems Brother     Thyroid Disease Maternal Grandfather     Lung Cancer Maternal Grandfather     Emphysema Paternal Grandmother     Breast Cancer Maternal Aunt     Other Maternal Aunt         vv    Other Other         Ulcers (unknown family member)    Thyroid Disease Son     Asthma Son     Alzheimer's Disease Paternal Grandfather        Social History     Socioeconomic History    Marital status: SINGLE     Spouse name: Not on file    Number of children: Not on file    Years of education: Not on file    Highest education level: Not on file   Occupational History    Not on file   Tobacco Use    Smoking status: Never Smoker    Smokeless tobacco: Never Used   Vaping Use    Vaping Use: Never used   Substance and Sexual Activity    Alcohol use: No    Drug use: No    Sexual activity: Yes     Partners: Male     Birth control/protection: None   Other Topics Concern    Not on file   Social History Narrative    Has 2 children    [address] [place] [address] Employee     Social Determinants of Health     Financial Resource Strain:     Difficulty of Paying Living Expenses: Not on file   Food Insecurity:     Worried About Running Out of Food in the Last Year: Not on file    Sherita of Food in the Last Year: Not on file   Transportation Needs:     Lack of Transportation (Medical): Not on file    Lack of Transportation (Non-Medical):  Not on file   Physical Activity:     Days of Exercise per Week: Not on file    Minutes of Exercise per Session: Not on file   Stress:     Feeling of Stress : Not on file   Social Connections:     Frequency of Communication with Friends and Family: Not on file    Frequency of Social Gatherings with Friends and Family: Not on file    Attends Anabaptist Services: Not on file    Active Member of Clubs or Organizations: Not on file    Attends Club or Organization Meetings: Not on file    Marital Status: Not on file   Intimate Partner Violence:     Fear of Current or Ex-Partner: Not on file    Emotionally Abused: Not on file    Physically Abused: Not on file    Sexually Abused: Not on file   Housing Stability:     Unable to Pay for Housing in the Last Year: Not on file    Number of [place] in the Last Year: Not on file    Unstable Housing in the Last Year: Not on file         ALLERGIES: Lactose, Levaquin [levofloxacin], and Penicillins    Review of Systems  Constitutional: Negative for fever. Negative for appetite change, chills, diaphoresis and unexpected weight change. HENT: Negative     Eyes: Negative   Respiratory: Negative  Cardiovascular: Negative  Musculoskeletal: Negative   Skin: Negative     Allergic/Immunologic: Negative  Neurological: As in HPI            Vitals:    01/28/22 1447   BP: (!) 138/94   Pulse: 81   Resp: 18   Temp: 97.6 °F (36.4 °C)   SpO2: 100%   Weight: 77.1 kg (170 lb)   Height: 5' 2\" (1.575 m)            Physical Exam   Constitutional: Oriented to person, place, and time. Appears well-developed and well-nourished. No distress. HENT:    Head: Normocephalic and atraumatic. No raccoon eyes or johnson signs. No depression, crepitus, hematoma. No bleeding or drainage from ears or nares  Right Ear: External ear normal.  Hemotympanum, no bleeding or drainage  Left Ear: External ear normal.   No hemotympanum, no bleeding or drainage  Nose: Nose normal.   Mouth/Throat: Mouth normal.    Eyes: Conjunctivae are normal.   Neck: Supple. No tracheal deviation. Cardiovascular: Normal rate, intact distal pulses. Brisk capillary refill intact, less than 2 seconds.  Regular rhythm present. No pitting edema. Pulmonary/Chest: Lungs are clear & equal bilaterally. No adventitious sounds. No respiratory distress. Abdominal: Soft. There is no tenderness. Musculoskeletal: No obvious deformity, erythema, edema. Neurological: Alert and oriented to person, place, and time. No numbness/tingling. No loss of sensation. Positive PMS ×4. GCS= 15.  Cranial nerves are grossly intact. She has no focal deficits on exam  Skin: Skin is warm and dry. Capillary refill takes less than 2 seconds. No abrasion, no lesion, no petechiae and no rash noted. Not diaphoretic. No cyanosis, erythema, or pallor. Negative Brudzinski, negative Kernig. Cranial nerves grossly intact. No focal deficits. Negative Kernig, negative Brudzinski  Psychiatric: Normal mood and affect. Behavior is normal.    Nursing note and vitals reviewed. MDM   22-year-old female in the ED with complaint of headache and neck pain. States consistent with regularly experience migraine headaches. Is worst of life, denies thunderclap onset. Denies numbness/tingling as weakness or visual change. Reported similar dizziness, now resolved. Pain made worse with bright lights and loud noise. No vomiting. No injury. Denies recent illness, fevers or chills. No neck rigidity, no meningeal signs: Brudzinski and Kernig sign. Patient remains seated upright in the ED, she passes much of her time using screen on her mobile device. States that she has had success with migraine cocktails in the past.  Clinical suspicion of meningitis, encephalitis, subarachnoid hemorrhage, stroke or other acute emergent process. Negative urine hCG, reassuring labs no elevated white count. Patient is alert and orient x3 with no focal deficits. Provided migraine cocktail and reevaluated. Patient states that her headache is resolved and she like be discharged home at this time.   She denies any further complaints states her symptoms are well managed at this time.  Offered further work-up and management, which she declines. Discussed with ED attending who collaborated on care planning. Will provide for short course of Fioricet as she reports is helped in the past. made aware of danger signs to be aware of and gave very strict return to ED for care instructions. Bella, to follow-up with PCP in 1-2 days. Patient is well-hydrated appearing, no distress. Nontoxic-appearing, tolerating oral intake, hemodynamically stable. All findings and plan were discussed with the patient. All questions answered. Discussed with the patient that an unremarkable evaluation in the ED does not preclude the development or presence of a serious or life threatening condition. Patient was instructed to return immediately for any worsening or change in current symptoms, or if symptoms do not continue to improve. I instructed them to follow up with their primary care provider, own specialist, or medical provider that I am recommending for him within the next 2-3 days  The patient acknowledged understanding plan of care and affirmed approval.     Signed by: TADEO Huntley     This note created using Dragon voice recognition software. Please excuse any accidental errors associated with its use, as note has not been fully proofread and edited.           Procedures

## 2022-01-29 NOTE — ED NOTES
I have reviewed discharge instructions with the patient. The patient verbalized understanding. Patient left ED via Discharge Method: ambulatory to Home with self. Opportunity for questions and clarification provided. No acute distress present      Patient given 1 scripts. To continue your aftercare when you leave the hospital, you may receive an automated call from our care team to check in on how you are doing. This is a free service and part of our promise to provide the best care and service to meet your aftercare needs.  If you have questions, or wish to unsubscribe from this service please call [phone]. Thank you for Choosing our [address] [place] [address] Emergency Department.

## 2022-03-18 PROBLEM — J45.50 SEVERE PERSISTENT ASTHMA: Status: ACTIVE | Noted: 2020-04-08

## 2022-03-18 PROBLEM — Z86.718 HISTORY OF BLOOD CLOTS: Status: ACTIVE | Noted: 2018-11-07

## 2022-03-18 PROBLEM — F41.9 ANXIETY DURING PREGNANCY IN SECOND TRIMESTER, ANTEPARTUM: Status: ACTIVE | Noted: 2018-11-07

## 2022-03-18 PROBLEM — O99.342 ANXIETY DURING PREGNANCY IN SECOND TRIMESTER, ANTEPARTUM: Status: ACTIVE | Noted: 2018-11-07

## 2022-03-18 PROBLEM — J30.9 ALLERGIC RHINITIS: Status: ACTIVE | Noted: 2019-12-05

## 2022-03-19 PROBLEM — O99.012 MATERNAL IRON DEFICIENCY ANEMIA AFFECTING PREGNANCY IN SECOND TRIMESTER, ANTEPARTUM: Status: ACTIVE | Noted: 2019-01-24

## 2022-03-19 PROBLEM — E66.9 OBESITY (BMI 30.0-34.9): Status: ACTIVE | Noted: 2019-02-26

## 2022-03-19 PROBLEM — D50.9 MATERNAL IRON DEFICIENCY ANEMIA AFFECTING PREGNANCY IN SECOND TRIMESTER, ANTEPARTUM: Status: ACTIVE | Noted: 2019-01-24

## 2022-03-19 PROBLEM — E66.811 OBESITY (BMI 30.0-34.9): Status: ACTIVE | Noted: 2019-02-26

## 2022-03-19 PROBLEM — Z87.42 HISTORY OF ABNORMAL CERVICAL PAPANICOLAOU SMEAR: Status: ACTIVE | Noted: 2018-11-07

## 2022-03-19 PROBLEM — O99.412 CARDIAC DISEASE DURING PREGNANCY IN SECOND TRIMESTER: Status: ACTIVE | Noted: 2018-11-07

## 2022-05-24 ENCOUNTER — TELEPHONE (OUTPATIENT)
Dept: SLEEP MEDICINE | Age: 37
End: 2022-05-24

## 2022-05-24 NOTE — TELEPHONE ENCOUNTER
Terrence Chanel @ [facility] is calling about Xolair prior auth. They need current notes. Documentation she is using. Fax [phone]. Attn:  Terrence Chanel

## 2022-05-25 NOTE — TELEPHONE ENCOUNTER
Note that she was on Advair as prescribed at her visit 9/2021. Review of chart indicates that Advair is no longer on MAR, unclear reason. No instructions from this office to discontinue. She needs appointment to address issues with denial of Xolair due to noncompliance with filling steroid inhaler. In order for approval for Xolair, she needs compliance with Advair for 3 months consecutively. Mary Garrison has left message for her to call our office. Last OV 9/2021:    PLAN:      Continue Advair twice daily, Singulair at bedtime, albuterol-advised that she may want to use 2-4 times daily on a scheduled basis for now until she has returned to baseline and has opportunity for additional doses of Xolair to be administered-she reports that this was started back approximately 6 weeks ago. Would like to avoid additional Rx of hydrocodone cough Rx, especially since she is on oxycodone for back pain. Advised to change Mucinex D to Mucinex DM per package instructions.     Continue omeprazole as prescribed, EGD as planned.     Follow-up in November as previously scheduled, sooner if needed.

## 2022-05-25 NOTE — TELEPHONE ENCOUNTER
Received a call from Dilcia Mendenhall with [employer]. He states that they will be denying the patients Xolair due to non compliance with filling her steroid inhaler. He states that in order for them to approve her continuation for Xolair she will need to fill her Advair for at least 3 months in a roll. Also they will need documentation of her receiving the xolair and benefiting from it. Almas Landon will be informed. I have also tried to call the patient and had to leave a message for her to call back.

## 2022-05-31 NOTE — TELEPHONE ENCOUNTER
I have spoken with patient. She is aware of communication with Mrs Motta Sylvia and approves appointment on 6/6 at [address] [place] [address] with Mrs Ángela Ward for further discussion.

## 2022-06-06 ENCOUNTER — OFFICE VISIT (OUTPATIENT)
Dept: PULMONOLOGY | Age: 37
End: 2022-06-06
Payer: COMMERCIAL

## 2022-06-06 VITALS
OXYGEN SATURATION: 100 % | HEART RATE: 94 BPM | SYSTOLIC BLOOD PRESSURE: 109 MMHG | WEIGHT: 178 LBS | TEMPERATURE: 97.1 F | DIASTOLIC BLOOD PRESSURE: 70 MMHG | HEIGHT: 61 IN | BODY MASS INDEX: 33.61 KG/M2

## 2022-06-06 DIAGNOSIS — Z79.899 MEDICATION MANAGEMENT: ICD-10-CM

## 2022-06-06 DIAGNOSIS — J30.9 ALLERGIC RHINITIS, UNSPECIFIED SEASONALITY, UNSPECIFIED TRIGGER: ICD-10-CM

## 2022-06-06 DIAGNOSIS — J45.50 SEVERE PERSISTENT ASTHMA WITHOUT COMPLICATION: Primary | ICD-10-CM

## 2022-06-06 PROCEDURE — 99213 OFFICE O/P EST LOW 20 MIN: CPT | Performed by: NURSE PRACTITIONER

## 2022-06-06 RX ORDER — MONTELUKAST SODIUM 10 MG/1
10 TABLET ORAL NIGHTLY
Qty: 90 TABLET | Refills: 3 | Status: SHIPPED | OUTPATIENT
Start: 2022-06-06

## 2022-06-06 RX ORDER — FLUTICASONE PROPIONATE AND SALMETEROL 250; 50 UG/1; UG/1
POWDER RESPIRATORY (INHALATION)
Qty: 3 EACH | Refills: 3 | Status: SHIPPED | OUTPATIENT
Start: 2022-06-06

## 2022-06-06 ASSESSMENT — ENCOUNTER SYMPTOMS
WHEEZING: 0
ABDOMINAL PAIN: 0
VOMITING: 0
NAUSEA: 0
BLOOD IN STOOL: 0
CONSTIPATION: 0
DIARRHEA: 0
CHEST TIGHTNESS: 0
APNEA: 0
COUGH: 0
SHORTNESS OF BREATH: 0

## 2022-06-06 NOTE — PROGRESS NOTES
She is a 44-year-old female who presents today due to recent denial from insurance for Xolair secondary to noncompliance failing steroid inhaler. Insurance has indicated that in order for them to approve continuation of Xolair, she will need to fill maintenance inhaler for at least 3 months in a row.     (To recap, has hx asthma, Veronica Belling was resumed several months ago after a brief time on hold secondary to pregnancy and breast-feeding. She had developed pattern of exacerbations with work in visits, courses of steroids while off of Krueger Hole was then denied by insurance, indicating that she was off of maintenance Hector Fidelina was not approved for \"monotherapy\" and required resuming maintenance RX.  She was actually using supply of IS/LABA that she has on hand.  She has not required use of albuterol inhaler since resuming Xolair. States that she is doing well now).     DIAGNOSTICS:     Spirometry 9/7/2016moderately severe obstructive defect. Sinus CT 7/2017 - FINDINGS: There is extensive bilateral paranasal sinus disease.  The maxillary and ethmoid sinuses are almost completely opacified bilaterally.  There is also opacification of the left sphenoid sinus.  The left frontal sinus and right sphenoid sinus are partially opacified.  The patient has probably had bilateral sinus surgery.  The maxillary ostia are widened.  There are no fractures.  There is no significant septal deviation. IMPRESSION: Extensive bilateral paranasal sinus disease. Spirometry 9/20/2019 normal.  IgE level of 255 and + allergen panel  CXR 2/18/2020 ER-lungs are clear, no acute process. CXR 12/21/2020lungs are clear.  No acute process. CXR 8/29/2021ERclear lungs, no acute process.

## 2022-06-06 NOTE — PROGRESS NOTES
[address] [place]. [address], [address] [facility]  [phone]    Patient Name:  Ruel Hill    YOB: 1985    Office Visit 6/6/2022      CHIEF COMPLAINT:      Chief Complaint   Patient presents with    Asthma    Medication Problem         HISTORY OF PRESENT ILLNESS:     She is a 46-year-old female who presents today due to recent denial from insurance for Xolair secondary to noncompliance failing steroid inhaler. Insurance has indicated that in order for them to approve continuation of Xolair, she will need to fill maintenance inhaler for at least 3 months in a row.     (To recap, has hx asthma, Sherin Solum was resumed several months ago after a brief time on hold secondary to pregnancy and breast-feeding. She had developed pattern of exacerbations with work in visits, courses of steroids while off of Leticia Pratt was then denied by insurance, indicating that she was off of maintenance Neli Rees was not approved for \"monotherapy\" and required resuming maintenance RX.  She was actually using supply of IS/LABA that she has on hand.  She has not required use of albuterol inhaler since resuming Xolair. States that she is doing well now).      She has received Xolair as recently as 2 weeks ago and has remained stable. States that it has been a while since she has used Advair. She has not required albuterol. Allergy symptoms are well controlled. She is compliant with Singulair.      DIAGNOSTICS:     Spirometry 9/7/2016-moderately severe obstructive defect.   Sinus CT 7/2017 - FINDINGS: There is extensive bilateral paranasal sinus disease.  The maxillary and ethmoid sinuses are almost completely opacified bilaterally.  There is also opacification of the left sphenoid sinus.  The left frontal sinus and right sphenoid sinus are partially opacified.  The patient has probably had bilateral sinus surgery.  The maxillary ostia are widened.  There are no fractures. Wandy Jono is no significant septal deviation. IMPRESSION: Extensive bilateral paranasal sinus disease. Spirometry 9/20/2019- normal.  IgE level of 255 and + allergen panel  CXR 2/18/2020 ER-lungs are clear, no acute process. CXR 12/21/2020-lungs are clear.  No acute process. CXR 8/29/2021-ER-clear lungs, no acute process. Past Medical History:   Diagnosis Date    Adverse effect of anesthesia     per pt-- shakes upon awaking    Allergic rhinitis     Alopecia areata 2015    Dr Juliette Eubanks: s/p steroid injection    Anemia, iron deficiency     Asthma     dx  age 25---- daily inhaler    Asthma affecting pregnancy in second trimester 6/4/2016    albuterol inhaler as needed 1/8/2019 at [facility]:  Continues to take Albuterol prn. 1/25/2019 at [facility]:  Stable on Albuterol prn. See High Risk Pregnancy Overview    Asthma exacerbation 03/2018    Depression     Encounter for insertion of mirena IUD 12/17/2015    GERD (gastroesophageal reflux disease)     controlled with med    High-risk pregnancy in second trimester 1/8/2019 12/17/2018 at [facility]: Normal NT, 1.3 mm, Nasal bone seen. Subchorionic hemorrhage currently 2.1 x 4.2 x 3.4 and 3.6 x 1.3 x 3.4. Panorama drawn today---> Low risk, female 1/8/2019 at [facility]:  Normal early anatomy. Single large [facility] still present, still with bright red bleeding. Very long cervix. I reassured patient that high chance to resolve, no treatment except bedrest. 1/25/2019 at [facility]:  Normal     High-risk pregnancy supervision 12/27/2013    History of gastric bypass 2009    initial wt loss 130lbs-- regained approx 20 lbs    Migraine     Nasal polyposis     PUD (peptic ulcer disease)     hx of GI ulcers since 2011    Seizures ([address] onset 2/2020    last one 1 week ago-- followed by dr. Kraig Perdomo hemorrhage in second trimester 11/7/2018 1/8/2019 at [facility]:  [facility] at internal os resolved. [facility] right lateral measuring 7.01 x 4.20 x 6.91 cm.   Reports heavy bleeding on ; using 1 pad/hr. Still having bright red bleeding today and wearing a pad along with \"menstral-like\" cramping. 2019 at [facility]:  CL 4.8 cm. [facility] measuring 6.2 x 7.3 x 4.2 cm; stable. Reports two days ago had heavy bleeding (saturated 4 pads over night) and passed    Supervision of high risk pregnancy in second trimester 9/3/2013    SVT (supraventricular tachycardia) ([address] 2013    per pt-- only during pregnancy-- no meds    Symptomatic varicose veins of left lower extremity 3/28/2017    Threatened  2019    Th[address]boemb[address]us [address]     s/p infiltrated IV in arm       Patient Active Problem List   Diagnosis    Severe persistent asthma    History of blood clots    Anxiety during pregnancy in second trimester, antepartum    Allergic rhinitis    Maternal iron deficiency anemia affecting pregnancy in second trimester, antepartum    Obesity (BMI 30.0-34. 9)    Cardiac disease during pregnancy in second trimester    History of abnormal cervical Papanicolaou smear    Gastroesophageal reflux disease         Past Surgical History:   Procedure Laterality Date     SECTION  2019    CHOLECYSTECTOMY, LAPAROSCOPIC      COLONOSCOPY N/A 2021    COLONOSCOPY/ BMI 34 performed by Felecia Barrow MD at [address]  2009     and  was revision with excision ulcer    GYN      x2 vaginal birth   [address] [facility]  2018    ventral    OTHER SURGICAL HISTORY  ,2016    scraped sinuses/ulcer surgery    [place] ABDOMEN SURGERY PROC UNLISTED      ulcer removed   [facility] SINUS SURGERY PROC UNLISTED  ,     Deviated septum    TONSILLECTOMY  2008    WISDOM TOOTH EXTRACTION           Social History     Socioeconomic History    Marital status: Single     Spouse name: None    Number of children: None    Years of education: None    Highest education level: None   Occupational History    None   Tobacco Use    Smoking status: Never Smoker  Smokeless tobacco: Never Used   Substance and Sexual Activity    Alcohol use: No    Drug use: No    Sexual activity: None   Other Topics Concern    None   Social History Narrative    Has 2 children  [address] [facility]     Social Determinants of Health     Financial Resource Strain:     Difficulty of Paying Living Expenses: Not on file   Food Insecurity:     Worried About Running Out of Food in the Last Year: Not on file    Calista of Food in the Last Year: Not on file   Transportation Needs:     Lack of Transportation (Medical): Not on file    Lack of Transportation (Non-Medical):  Not on file   Physical Activity:     Days of Exercise per Week: Not on file    Minutes of Exercise per Session: Not on file   Stress:     Feeling of Stress : Not on file   Social Connections:     Frequency of Communication with Friends and Family: Not on file    Frequency of Social Gatherings with Friends and Family: Not on file    Attends Hindu Services: Not on file    Active Member of [address] [place] [address] or Organizations: Not on file    Attends Club or Organization Meetings: Not on file    Marital Status: Not on file   Intimate Partner Violence:     Fear of Current or Ex-Partner: Not on file    Emotionally Abused: Not on file    Physically Abused: Not on file    Sexually Abused: Not on file   Housing Stability:     Unable to Pay for Housing in the Last Year: Not on file    Number of [place] in the Last Year: Not on file    Unstable Housing in the Last Year: Not on file       Family History   Problem Relation Age of Onset    Thyroid Disease Mother         Graves Disease    Other Mother         varicose veins    No Known Problems Father     No Known Problems Brother     Thyroid Disease Maternal Grandfather     Lung Cancer Maternal Grandfather     Emphysema Paternal Grandmother     Breast Cancer Maternal Aunt     Other Maternal Aunt         vv    Other Other         Ulcers (unknown family member)    Thyroid Disease Son     Asthma Son    Small Alzheimer's Disease Paternal Grandfather        Allergies   Allergen Reactions    Lactose Diarrhea    Levofloxacin Other (See Comments)     Chest/back pain    Penicillins Other (See Comments)     UTI sx per pt       Current Outpatient Medications   Medication Sig    albuterol sulfate  (90 Base) MCG/ACT inhaler Inhale 2 puffs into the lungs every 4 hours as needed    LORazepam (ATIVAN) 1 MG tablet Take by mouth every 4 hours as needed.  montelukast (SINGULAIR) 10 MG tablet Take 1 tablet by mouth q hs    omalizumab (XOLAIR) 150 MG injection Inject into the skin every 14 days    omeprazole (PRILOSEC) 40 MG delayed release capsule Take 40 mg by mouth 2 times daily    sertraline (ZOLOFT) 100 MG tablet Take 100 mg by mouth daily    sucralfate (CARAFATE) 1 GM tablet Take 1 g by mouth 4 times daily    terconazole (TERAZOL 7) 0.4 % vaginal cream Place 1 applicator vaginally    zonisamide (ZONEGRAN) 100 MG capsule Take 200 mg by mouth daily     No current facility-administered medications for this visit. REVIEW OF SYSTEMS:    Review of Systems   Constitutional: Negative for chills, fatigue, fever and unexpected weight change. Respiratory: Negative for apnea, cough, chest tightness, shortness of breath and wheezing. Cardiovascular: Negative for chest pain, palpitations and leg swelling. Gastrointestinal: Negative for abdominal pain, blood in stool, constipation, diarrhea, nausea and vomiting. Neurological: Negative for dizziness, tremors, weakness and headaches. PHYSICAL EXAM:    Vitals:    06/06/22 1613   BP: 109/70   Pulse: 94   Temp: 97.1 °F (36.2 °C)   SpO2: 100%   Weight: 178 lb (80.7 kg)   Height: 5' 1\" (1.549 m)        Body mass index is 33.63 kg/m². GENERAL APPEARANCE:  The patient is overweight and in no respiratory distress. HEENT:  PERRL. Conjunctivae unremarkable.     NECK/LYMPHATIC:   Symmetrical with no elevation of jugular venous pulsation. Trachea midline. No thyroid enlargement. No cervical adenopathy. LUNGS:   Normal respiratory effort with symmetrical lung expansion. Breath sounds-completely clear. Clenton Reas HEART:   There is a normal rate and regular rhythm. No murmur, rub, or gallop. There is no edema in the lower extremities. NEURO:  The patient is alert and oriented to person, place, and time. Memory appears intact and mood is normal.  No gross sensorimotor deficits are present. DIAGNOSTIC TESTS: Studies were personally reviewed by me and discussed with the patient. CXR:    XR CHEST (2 VW) 08/29/2021    Narrative  EXAM: XR CHEST PA LAT    INDICATION: cough SOB    COMPARISON: 12/21/2020. FINDINGS: PA and lateral radiographs of the chest demonstrate clear lungs. The  cardiac and mediastinal contours and pulmonary vascularity are normal. The bones  and soft tissues are within normal limits. Impression  Normal chest.      ASSESSMENT:   (Medical Decision Making)                                                                                                                                          Encounter Diagnoses   Name Primary?  Severe persistent asthma without complication Yes    Allergic rhinitis, unspecified seasonality, unspecified trigger     Medication management      Patient well controlled with Xolair therapy, Singulair. However insurance requires that Xolair therapy is not indicated as monotherapy for asthma and will need to resume maintenance inhaler. Discussed with her and she is agreeable. She has not required albuterol recently                        PLAN:     Resume Advair 250/50, 1 inhalation twice daily, rinse mouth after use. She is aware that insurance requires this filled on 3 consecutive months in order to obtain approval for Xolair. Continue Singulair 10 mg at bedtime. Continue Xolair as prescribed.     Follow-up in 6 months with spirometry, sooner if needed. Encouraged to have COVID booster per CDC guidelines. Orders Placed This Encounter    fluticasone-salmeterol (ADVAIR DISKUS) 250-50 MCG/ACT AEPB diskus inhaler     Si inhalation twice daily, rinse mouth after use     Dispense:  3 each     Refill:  3    montelukast (SINGULAIR) 10 MG tablet     Sig: Take 1 tablet by mouth nightly     Dispense:  90 tablet     Refill:  3           LIS DAVIDSON, APRN - CNP    Total  time spent with patient -25 min. Over 50% of today's office visit was spent in face to face time reviewing test results, prognosis, importance of compliance, education about disease process, benefits of medications, instructions for management of acute symptoms, and follow up plans. Collaborating MD:      Electronically signed. Dictated using voice recognition software.   Proof read but unrecognized errors may exist.

## 2022-07-12 ENCOUNTER — TELEPHONE (OUTPATIENT)
Dept: PULMONOLOGY | Age: 37
End: 2022-07-12

## 2022-07-12 NOTE — TELEPHONE ENCOUNTER
Mitzi BUITRAGO  From [facility] called to check on the status of the appeal for the  Drug Xolair for the patient please contact her at [phone] EXT# [phone]

## 2022-07-19 NOTE — TELEPHONE ENCOUNTER
Spoke with [facility] and informed her that the appeal had been overturned. Xolair has been approved.

## 2022-09-13 NOTE — PROGRESS NOTES
The patient is a 30 y.o. No obstetric history on file who is seen for TVUS for heavy vaginal bleeding during periods (more than 1 pad/hour) with current pelvic pain that started over the last month that has worsened this past week. .  She is due for a period, lmp 8/17/22. She has monthly and regular cycles that last 3-4 days but they are very heavy. This is typical for her. At its heaviest changes 2 pads an hour with clots. Bleeding is associated with cramping. She had an Iud in the past but notes that it did not really improve her symptoms. She has also used OCPs in the past but would prefer to stay off. She is open to surgical management of her menorrhagia. US Results:   Cx appears WNL with simple nabothian cysts. Uterus is enlarges, anteverted and heterogenous. Endo = 13 mm, no intracavitary masses visualized. ROV visualized only TA. Appears enlarged with multiple follicles in the periphery, appearance c/w possible PCOS. Blood flow visualized. Pt expressed this was an area of pain while scanning. LOV visualized with multiple follicles in periphery, appearance c/w possible PCOS although not as large as ROV. Blood flow visualized. No adnexal masses or fluid seen. HISTORY:    No obstetric history on file. Patient's last menstrual period was 08/17/2022 (exact date). Sexual History:  has sex with males  Contraception:  none  Current Outpatient Medications on File Prior to Visit   Medication Sig Dispense Refill    fluticasone-salmeterol (ADVAIR DISKUS) 250-50 MCG/ACT AEPB diskus inhaler 1 inhalation twice daily, rinse mouth after use 3 each 3    montelukast (SINGULAIR) 10 MG tablet Take 1 tablet by mouth nightly 90 tablet 3    albuterol sulfate  (90 Base) MCG/ACT inhaler Inhale 2 puffs into the lungs every 4 hours as needed      LORazepam (ATIVAN) 1 MG tablet Take by mouth every 4 hours as needed.       omalizumab (XOLAIR) 150 MG injection Inject into the skin every 14 days omeprazole (PRILOSEC) 40 MG delayed release capsule Take 40 mg by mouth 2 times daily      sertraline (ZOLOFT) 100 MG tablet Take 100 mg by mouth daily      sucralfate (CARAFATE) 1 GM tablet Take 1 g by mouth 4 times daily      zonisamide (ZONEGRAN) 100 MG capsule Take 200 mg by mouth daily       No current facility-administered medications on file prior to visit. ROS:  Feeling well. No dyspnea or chest pain on exertion. No abdominal pain, change in bowel habits, black or bloody stools. No urinary tract symptoms. GYN ROS: she complains of heavy and painful periods. PHYSICAL EXAM:  Height 5' 1\" (1.549 m), weight 172 lb 3.2 oz (78.1 kg), last menstrual period 08/17/2022. The patient appears well, alert, oriented x 3, in no distress. Exam deferred, us only    ASSESSMENT:  Encounter Diagnoses   Name Primary? Pelvic pain Yes    Menorrhagia with regular cycle        PLAN:  All questions answered  Diagnosis explained in detail, including differential  Reviewed US findings with pt. Heterogeneous uterus suggestive of adenomyosis, possibly contributory to her heavy and painful periods. Stripe 13mm, but pt due for menses now. Ovaries c/w PCO, no other significant findings. Disc options for management of menorrhagia to include IUD vs OCP vs surgical mgmt. Pt would prefer to discuss surgical options for management of menorrhagia. I think she would be a good candidate for ablation as she has BTL. We disc the procedure and its benefits, also possibility of failed ablation or regeneration of endometrium. We also disc hysterectomy as an option for definitively eliminating vaginal bleeding. Will bring her back with physician to review ablation vs hyst as options for surgical management. Orders Placed This Encounter   Procedures    AMB POC US, TRANSVAGINAL     Order Specific Question:   Reason for Exam:     Answer:   GYN     Order Specific Question:   Are you Pregnant?      Answer:   No    AMB POC US, PELVIC (NONOBSTETRIC),REAL TIME,LIMITED     Order Specific Question:   Reason for Exam:     Answer:   GYN     Order Specific Question:   Are you Pregnant? Answer:   No         Supervising physician is Dr. Markell Lowe.   25 min chart review, US reviews, counseling and documentation

## 2022-09-14 ENCOUNTER — OFFICE VISIT (OUTPATIENT)
Dept: OBGYN CLINIC | Age: 37
End: 2022-09-14
Payer: COMMERCIAL

## 2022-09-14 VITALS
SYSTOLIC BLOOD PRESSURE: 118 MMHG | DIASTOLIC BLOOD PRESSURE: 74 MMHG | WEIGHT: 172.2 LBS | BODY MASS INDEX: 32.51 KG/M2 | HEIGHT: 61 IN

## 2022-09-14 DIAGNOSIS — R10.2 PELVIC PAIN: ICD-10-CM

## 2022-09-14 DIAGNOSIS — N92.0 MENORRHAGIA WITH REGULAR CYCLE: Primary | ICD-10-CM

## 2022-09-14 LAB
BASOPHILS # BLD: 0.1 K/UL (ref 0–0.2)
BASOPHILS NFR BLD: 2 % (ref 0–2)
DIFFERENTIAL METHOD BLD: ABNORMAL
EOSINOPHIL # BLD: 0.7 K/UL (ref 0–0.8)
EOSINOPHIL NFR BLD: 11 % (ref 0.5–7.8)
ERYTHROCYTE [DISTWIDTH] IN BLOOD BY AUTOMATED COUNT: 16.8 % (ref 11.9–14.6)
HCT VFR BLD AUTO: 30.7 % (ref 35.8–46.3)
HGB BLD-MCNC: 8.9 G/DL (ref 11.7–15.4)
IMM GRANULOCYTES # BLD AUTO: 0 K/UL (ref 0–0.5)
IMM GRANULOCYTES NFR BLD AUTO: 0 % (ref 0–5)
LYMPHOCYTES # BLD: 2.5 K/UL (ref 0.5–4.6)
LYMPHOCYTES NFR BLD: 37 % (ref 13–44)
MCH RBC QN AUTO: 21.2 PG (ref 26.1–32.9)
MCHC RBC AUTO-ENTMCNC: 29 G/DL (ref 31.4–35)
MCV RBC AUTO: 73.3 FL (ref 79.6–97.8)
MONOCYTES # BLD: 0.4 K/UL (ref 0.1–1.3)
MONOCYTES NFR BLD: 6 % (ref 4–12)
NEUTS SEG # BLD: 3 K/UL (ref 1.7–8.2)
NEUTS SEG NFR BLD: 44 % (ref 43–78)
NRBC # BLD: 0 K/UL (ref 0–0.2)
PLATELET # BLD AUTO: 324 K/UL (ref 150–450)
PMV BLD AUTO: 10.7 FL (ref 9.4–12.3)
RBC # BLD AUTO: 4.19 M/UL (ref 4.05–5.2)
T4 FREE SERPL-MCNC: 0.8 NG/DL (ref 0.78–1.46)
TSH, 3RD GENERATION: 1.64 UIU/ML (ref 0.36–3.74)
WBC # BLD AUTO: 6.7 K/UL (ref 4.3–11.1)

## 2022-09-14 PROCEDURE — 99214 OFFICE O/P EST MOD 30 MIN: CPT | Performed by: NURSE PRACTITIONER

## 2022-09-14 PROCEDURE — 76830 TRANSVAGINAL US NON-OB: CPT | Performed by: NURSE PRACTITIONER

## 2022-09-14 PROCEDURE — 76857 US EXAM PELVIC LIMITED: CPT | Performed by: NURSE PRACTITIONER

## 2022-10-03 ENCOUNTER — OFFICE VISIT (OUTPATIENT)
Dept: OBGYN CLINIC | Age: 37
End: 2022-10-03
Payer: COMMERCIAL

## 2022-10-03 VITALS
DIASTOLIC BLOOD PRESSURE: 74 MMHG | HEIGHT: 61 IN | WEIGHT: 173 LBS | SYSTOLIC BLOOD PRESSURE: 118 MMHG | BODY MASS INDEX: 32.66 KG/M2

## 2022-10-03 DIAGNOSIS — N85.2 ENLARGED UTERUS: ICD-10-CM

## 2022-10-03 DIAGNOSIS — N94.6 DYSMENORRHEA: ICD-10-CM

## 2022-10-03 DIAGNOSIS — N92.0 MENORRHAGIA WITH REGULAR CYCLE: Primary | ICD-10-CM

## 2022-10-03 PROCEDURE — 99214 OFFICE O/P EST MOD 30 MIN: CPT | Performed by: OBSTETRICS & GYNECOLOGY

## 2022-10-03 RX ORDER — TRANEXAMIC ACID 650 1/1
TABLET ORAL
Qty: 30 TABLET | Refills: 5 | Status: SHIPPED | OUTPATIENT
Start: 2022-10-03

## 2022-10-03 NOTE — PROGRESS NOTES
The patient is a 30 y.o. No obstetric history on file. who is seen for a talk to discuss having a hysterectomy vs ablation due to heavy periods. She has cramps before her period starts. Her cycle is heavy. Discussed ablation and amenorrhea / possible tubal ablation syndrome. Discussed partial hysterectomy with risk associated. Worried with hx of cramping that ablation may not help that. 9/14/2022 US:  Uterine volume is 233ml / 9 week size. Cx appears WNL with simple nabothian cysts. Uterus is enlarges, anteverted and heterogenous. Endo = 13 mm, no intracavitary masses visualized. ROV visualized only TA. Appears enlarged with multiple follicles in the periphery, appearance c/w possible PCOS. Blood flow visualized. Pt expressed this was an area of pain while scanning. LOV visualized with multiple follicles in periphery, appearance c/w possible PCOS although not as large as ROV. Blood flow visualized. No adnexal masses or fluid seen. HISTORY:    No obstetric history on file. Patient's last menstrual period was 09/27/2022 (approximate). Sexual History:  has sex with males  Contraception:  tubal ligation  Current Outpatient Medications on File Prior to Visit   Medication Sig Dispense Refill    fluticasone-salmeterol (ADVAIR DISKUS) 250-50 MCG/ACT AEPB diskus inhaler 1 inhalation twice daily, rinse mouth after use 3 each 3    montelukast (SINGULAIR) 10 MG tablet Take 1 tablet by mouth nightly 90 tablet 3    albuterol sulfate  (90 Base) MCG/ACT inhaler Inhale 2 puffs into the lungs every 4 hours as needed      LORazepam (ATIVAN) 1 MG tablet Take by mouth every 4 hours as needed.       omalizumab (XOLAIR) 150 MG injection Inject into the skin every 14 days      omeprazole (PRILOSEC) 40 MG delayed release capsule Take 40 mg by mouth 2 times daily      sertraline (ZOLOFT) 100 MG tablet Take 100 mg by mouth daily      sucralfate (CARAFATE) 1 GM tablet Take 1 g by mouth 4 times daily zonisamide (ZONEGRAN) 100 MG capsule Take 200 mg by mouth daily       No current facility-administered medications on file prior to visit. Past Medical History:   Diagnosis Date    Adverse effect of anesthesia     per pt-- shakes upon awaking    Allergic rhinitis     Alopecia areata 2015    Dr Faye Cruz: s/p steroid injection    Anemia, iron deficiency     Asthma     dx  age 25---- daily inhaler    Asthma affecting pregnancy in second trimester 6/4/2016    albuterol inhaler as needed 1/8/2019 at [facility]:  Continues to take Albuterol prn. 1/25/2019 at [facility]:  Stable on Albuterol prn. See High Risk Pregnancy Overview    Asthma exacerbation 03/2018    Depression     Encounter for insertion of mirena IUD 12/17/2015    GERD (gastroesophageal reflux disease)     controlled with med    High-risk pregnancy in second trimester 1/8/2019 12/17/2018 at [facility]: Normal NT, 1.3 mm, Nasal bone seen. Subchorionic hemorrhage currently 2.1 x 4.2 x 3.4 and 3.6 x 1.3 x 3.4. Panorama drawn today---> Low risk, female 1/8/2019 at [facility]:  Normal early anatomy. Single large [facility] still present, still with bright red bleeding. Very long cervix. I reassured patient that high chance to resolve, no treatment except bedrest. 1/25/2019 at [facility]:  Normal     High-risk pregnancy supervision 12/27/2013    History of gastric bypass 2009    initial wt loss 130lbs-- regained approx 20 lbs    Migraine     Nasal polyposis     PUD (peptic ulcer disease)     hx of GI ulcers since 2011    Seizures ([address] onset 2/2020    last one 1 week ago-- followed by dr. Khris Menon hemorrhage in second trimester 11/7/2018 1/8/2019 at [facility]:  [facility] at internal os resolved. [facility] right lateral measuring 7.01 x 4.20 x 6.91 cm. Reports heavy bleeding on Sunday; using 1 pad/hr. Still having bright red bleeding today and wearing a pad along with \"menstral-like\" cramping. 1/25/2019 at [facility]:  CL 4.8 cm. [facility] measuring 6.2 x 7.3 x 4.2 cm; stable.   Reports two days ago had heavy bleeding (saturated 4 pads over night) and passed    Supervision of high risk pregnancy in second trimester 9/3/2013    SVT (supraventricular tachycardia) [address] [address] 2013    per pt-- only during pregnancy-- no meds    Symptomatic varicose veins of left lower extremity 3/28/2017    Threatened  2019    Thromboembolus ([facility] 2015    s/p infiltrated IV in arm    Neg thrombophilia workup   Past Surgical History:   Procedure Laterality Date     SECTION  2019    CHOLECYSTECTOMY, LAPAROSCOPIC      COLONOSCOPY N/A 2021    COLONOSCOPY/ BMI 34 performed by Franc Ware MD at [facility].  2009     and  was revision with excision ulcer    GYN      x2 vaginal birth    HERNIA REPAIR  2018    ventral    OTHER SURGICAL HISTORY  ,2016    scraped sinuses/ulcer surgery    [place] ABDOMEN SURGERY PROC UNLISTED      ulcer removed    SINUS SURGERY PROC UNLISTED  ,     Deviated septum    TONSILLECTOMY      WISDOM TOOTH EXTRACTION        ROS:  Feeling well. No dyspnea or chest pain on exertion. No abdominal pain, change in bowel habits, black or bloody stools. No urinary tract symptoms. GYN ROS: normal menses, no abnormal bleeding, pelvic pain or discharge, no breast pain or new or enlarging lumps on self exam.    PHYSICAL EXAM:  Blood pressure 118/74, height 5' 1\" (1.549 m), weight 173 lb (78.5 kg), last menstrual period 2022. The patient appears well, alert, oriented x 3, in no distress. Lungs are clear. Heart RRR, no murmurs. Abdomen soft without tenderness, guarding, mass or organomegaly. Pelvic: deferred . ASSESSMENT:  Encounter Diagnoses   Name Primary? Menorrhagia with regular cycle Yes    Dysmenorrhea     Enlarged uterus        PLAN:  All questions answered. Will try lysteda in interim but pt has elected to proceed with partial hysterectomy with ovarian conservation. Will see back for preop visit.   Significant asthma / hx of seizures with no meds and hx of clot.

## 2022-10-04 ENCOUNTER — TELEPHONE (OUTPATIENT)
Dept: OBGYN CLINIC | Age: 37
End: 2022-10-04

## 2022-10-19 ENCOUNTER — PREP FOR PROCEDURE (OUTPATIENT)
Dept: OBGYN CLINIC | Age: 37
End: 2022-10-19

## 2022-10-19 PROBLEM — N85.2 ENLARGED UTERUS: Status: ACTIVE | Noted: 2022-10-19

## 2022-10-19 PROBLEM — N92.0 MENORRHAGIA: Status: ACTIVE | Noted: 2022-10-19

## 2022-10-19 PROBLEM — N94.6 DYSMENORRHEA: Status: ACTIVE | Noted: 2022-10-19

## 2022-12-06 ENCOUNTER — CLINICAL DOCUMENTATION (OUTPATIENT)
Dept: PULMONOLOGY | Age: 37
End: 2022-12-06

## 2022-12-06 NOTE — PROGRESS NOTES
Called the patient to offer VV bereket or re-schedule.  No answer, left message on VM to call us back Jagjit Poe, [address]

## 2022-12-19 ENCOUNTER — TELEPHONE (OUTPATIENT)
Dept: PULMONOLOGY | Age: 37
End: 2022-12-19

## 2022-12-19 NOTE — TELEPHONE ENCOUNTER
Isabel @ [facility] called to say that pt missed 4 appointments without calling.  They would like to know if they should close referral.     Please call back - [phone]

## 2022-12-21 NOTE — TELEPHONE ENCOUNTER
MICHELLE GillP has been informed. She states that if the patient isn't showing up for her appts it's ok for them to close the referral.     I call [facility] and spoke with Renee Owens, she states that the patient was a no show on 12/2,12/7, 12/14 and is scheduled for 12/28.  They will reach out to her to see if she is going to keep this appt before closing the referral.

## 2023-02-28 NOTE — PROGRESS NOTES
The patient is a 30 y.o. No obstetric history on file. who is seen for vaginal irritation. Pt c/o dysuria. This has been going on for about a week, pt denies trying anything OTC. Reports dysuria, pelvic \"numbness\" and discomfort. Not really having any pelvic pain. She also has vaginal irritation. No urinary frequency or urgency. She is having a hysterectomy end of March with Dr. Gloria Palacios. Pt c/o vaginal discharge, took a leftover Diflucan that she had but it did not really help. Denies any abnormal discharge. Some odor. Pt c/o pelvic cramping. All these symptoms began a week ago. UA large blood and tr leuks   She is just ending her period. HISTORY:    No obstetric history on file. Patient's last menstrual period was 02/25/2023 (approximate). Sexual History:  single partner, contraception - tubal ligation  Contraception:  tubal ligation  Current Outpatient Medications on File Prior to Visit   Medication Sig Dispense Refill    fluticasone-salmeterol (ADVAIR DISKUS) 250-50 MCG/ACT AEPB diskus inhaler 1 inhalation twice daily, rinse mouth after use 3 each 3    montelukast (SINGULAIR) 10 MG tablet Take 1 tablet by mouth nightly 90 tablet 3    albuterol sulfate  (90 Base) MCG/ACT inhaler Inhale 2 puffs into the lungs every 4 hours as needed      LORazepam (ATIVAN) 1 MG tablet Take by mouth every 4 hours as needed. omalizumab (XOLAIR) 150 MG injection Inject into the skin every 14 days      omeprazole (PRILOSEC) 40 MG delayed release capsule Take 40 mg by mouth 2 times daily      sertraline (ZOLOFT) 100 MG tablet Take 100 mg by mouth daily      sucralfate (CARAFATE) 1 GM tablet Take 1 g by mouth 4 times daily      zonisamide (ZONEGRAN) 100 MG capsule Take 200 mg by mouth daily       No current facility-administered medications on file prior to visit. ROS:  Feeling well. No dyspnea or chest pain on exertion. No abdominal pain, change in bowel habits, black or bloody stools.   No urinary tract symptoms. GYN ROS: she complains of dysuria and vulvar irritation with vaginal odor.    PHYSICAL EXAM:  Blood pressure 116/60, height 5' 1\" (1.549 m), weight 179 lb 9.6 oz (81.5 kg), last menstrual period 02/25/2023.    The patient appears well, alert, oriented x 3, in no distress.   Pelvic: VULVA: normal appearing vulva with no masses, tenderness or lesions, VAGINA: normal appearing vagina with normal color and discharge, no lesions, CERVIX: normal appearing cervix without discharge or lesions.    ASSESSMENT:  Encounter Diagnoses   Name Primary?    Vaginal irritation Yes    Dysuria     Vaginal odor        PLAN:  All questions answered  Diagnosis explained in detail, including differential  Ceck urine cx  Check nuswab vaginitis plus  Will tx based on result  Advised pt to change to organic cotton pads/liners as suspect traditional pads/liners contributing to external vulvar irritation.    Orders Placed This Encounter   Procedures    Culture, Urine     Standing Status:   Future     Number of Occurrences:   1     Standing Expiration Date:   3/1/2024     Order Specific Question:   Specify (ex-cath, midstream, cysto, etc)?     Answer:   mid stream    Nuswab Vaginitis Plus (VG+)     Standing Status:   Future     Number of Occurrences:   1     Standing Expiration Date:   3/1/2024    AMB POC URINALYSIS DIP STICK AUTO W/O MICRO         Supervising physician is Dr. Rubin.  Greater than 50% of the 20 minute visit were spent in counseling to the above topics.

## 2023-03-01 ENCOUNTER — OFFICE VISIT (OUTPATIENT)
Dept: OBGYN CLINIC | Age: 38
End: 2023-03-01
Payer: MEDICAID

## 2023-03-01 VITALS
WEIGHT: 179.6 LBS | SYSTOLIC BLOOD PRESSURE: 116 MMHG | DIASTOLIC BLOOD PRESSURE: 60 MMHG | HEIGHT: 61 IN | BODY MASS INDEX: 33.91 KG/M2

## 2023-03-01 DIAGNOSIS — N89.8 VAGINAL ODOR: ICD-10-CM

## 2023-03-01 DIAGNOSIS — N89.8 VAGINAL IRRITATION: Primary | ICD-10-CM

## 2023-03-01 DIAGNOSIS — R30.0 DYSURIA: ICD-10-CM

## 2023-03-01 LAB
BILIRUBIN, URINE, POC: NEGATIVE
BLOOD URINE, POC: NORMAL
GLUCOSE URINE, POC: NEGATIVE
KETONES, URINE, POC: NORMAL
LEUKOCYTE ESTERASE, URINE, POC: NORMAL
NITRITE, URINE, POC: NEGATIVE
PH, URINE, POC: 6 (ref 4.6–8)
PROTEIN,URINE, POC: 30
SPECIFIC GRAVITY, URINE, POC: 1.02 (ref 1–1.03)
URINALYSIS CLARITY, POC: CLEAR
URINALYSIS COLOR, POC: NORMAL
UROBILINOGEN, POC: NORMAL

## 2023-03-01 PROCEDURE — 99214 OFFICE O/P EST MOD 30 MIN: CPT | Performed by: NURSE PRACTITIONER

## 2023-03-01 PROCEDURE — 81003 URINALYSIS AUTO W/O SCOPE: CPT | Performed by: NURSE PRACTITIONER

## 2023-03-03 LAB
A VAGINAE DNA VAG QL NAA+PROBE: ABNORMAL SCORE
BACTERIA SPEC CULT: NORMAL
BACTERIA SPEC CULT: NORMAL
BVAB2 DNA VAG QL NAA+PROBE: ABNORMAL SCORE
C ALBICANS DNA VAG QL NAA+PROBE: NEGATIVE
C GLABRATA DNA VAG QL NAA+PROBE: NEGATIVE
C TRACH RRNA SPEC QL NAA+PROBE: NEGATIVE
MEGA1 DNA VAG QL NAA+PROBE: ABNORMAL SCORE
N GONORRHOEA RRNA SPEC QL NAA+PROBE: NEGATIVE
SERVICE CMNT-IMP: NORMAL
SPECIMEN SOURCE: ABNORMAL
T VAGINALIS RRNA SPEC QL NAA+PROBE: NEGATIVE

## 2023-03-06 ENCOUNTER — TELEPHONE (OUTPATIENT)
Dept: OBGYN CLINIC | Age: 38
End: 2023-03-06

## 2023-03-06 DIAGNOSIS — B96.89 BACTERIAL VAGINOSIS: Primary | ICD-10-CM

## 2023-03-06 DIAGNOSIS — N76.0 BACTERIAL VAGINOSIS: Primary | ICD-10-CM

## 2023-03-06 RX ORDER — METRONIDAZOLE 500 MG/1
TABLET ORAL
Qty: 14 TABLET | Refills: 0 | Status: SHIPPED | OUTPATIENT
Start: 2023-03-06

## 2023-03-06 NOTE — TELEPHONE ENCOUNTER
Called and spoke to pt about results of Nuswab being positive for BV. Will send a prescription for Flaygl. Pt v/u and will call if this does not help with the infections.

## 2023-03-14 ENCOUNTER — TELEPHONE (OUTPATIENT)
Dept: OBGYN CLINIC | Age: 38
End: 2023-03-14

## 2023-03-14 NOTE — TELEPHONE ENCOUNTER
LMOM about missed pre-op appt today in our office. Pt to call back if desires to reschedule or to cancel upcoming surgery. Appt for 3/27/23 @ 2:15pm given if patient would like to reschedule pre-op then.

## 2023-03-16 NOTE — TELEPHONE ENCOUNTER
Attempted to contact patient with no answer. LMOM asking that she call back to reschedule pre-op or to let us know if she would like to cancel surgery. Message left if she does not return our call by 12 noon tomorrow her surgery will be cancelled.

## 2023-03-20 ENCOUNTER — NURSE ONLY (OUTPATIENT)
Dept: SURGERY | Age: 38
End: 2023-03-20

## 2023-03-20 ENCOUNTER — HOSPITAL ENCOUNTER (OUTPATIENT)
Dept: LAB | Age: 38
Discharge: HOME | End: 2023-03-23
Payer: MEDICAID

## 2023-03-20 DIAGNOSIS — Z01.818 PRE-OP TESTING: ICD-10-CM

## 2023-03-20 LAB — HGB BLD-MCNC: 9 G/DL (ref 11.7–15.4)

## 2023-03-20 PROCEDURE — 36415 COLL VENOUS BLD VENIPUNCTURE: CPT

## 2023-03-20 PROCEDURE — 85018 HEMOGLOBIN: CPT

## 2023-03-20 NOTE — PROGRESS NOTES
The lab results below are to be reviewed by anesthesia. Labs routed to surgeon. Surgery dated 03/29/23 noted to be canceled.         Latest Reference Range & Units 3/20/23 11:30   Hemoglobin Quant 11.7 - 15.4 g/dL 9.0 (L)   (L): Data is abnormally low

## 2023-03-21 NOTE — PROGRESS NOTES
Dr. Elissa Lemos reviewed chart. New order received \"type and cross DOS. \" Edda Nieves to proceed. Error code received when trying to place order due to case being canceled. Type and Screen order placed. Will need Type and Cross order entered when surgery rescheduled.

## 2023-03-22 ENCOUNTER — TELEPHONE (OUTPATIENT)
Dept: OBGYN CLINIC | Age: 38
End: 2023-03-22

## 2023-03-22 NOTE — TELEPHONE ENCOUNTER
Talked with patient about her cancelled surgery. Pt states she was unaware she had an office pre-op appointment. Pt states she did not get any phone messages about her missed pre-op appt and did not see her Qubrit message until today. Surgery added back on for 3/29/23 for [facility] @ 1430. Pre-Op scheduled 3/27/23 @ 2:15pm.  Pt notified of both and confirmed.

## 2023-03-22 NOTE — TELEPHONE ENCOUNTER
Patient called about her surgery that had been canceled on the 29th. She is wanting to proceed with surgery but did not know why it had been canceled. Surgery was canceled because patient no showed for pre-op exam and the office made multiple attempt to contact the patient via phone and my chart but were unable to reach patient. Patient was very upset that her surgery had been canceled because she never received a phone call, voice mail or my chart message about her pre-op visit. Patient asked to speak with a manager. Patient placed on hold and transferred to speak with Mook Raymond the clinical manager.

## 2023-03-23 ENCOUNTER — TELEPHONE (OUTPATIENT)
Dept: OBGYN CLINIC | Age: 38
End: 2023-03-23

## 2023-03-27 ENCOUNTER — OFFICE VISIT (OUTPATIENT)
Dept: OBGYN CLINIC | Age: 38
End: 2023-03-27

## 2023-03-27 VITALS — SYSTOLIC BLOOD PRESSURE: 121 MMHG | BODY MASS INDEX: 32.61 KG/M2 | DIASTOLIC BLOOD PRESSURE: 74 MMHG | WEIGHT: 172.6 LBS

## 2023-03-27 DIAGNOSIS — Z01.818 PREOP EXAMINATION: Primary | ICD-10-CM

## 2023-03-27 PROCEDURE — 99024 POSTOP FOLLOW-UP VISIT: CPT | Performed by: OBSTETRICS & GYNECOLOGY

## 2023-03-27 NOTE — H&P
vertical incision), colostomy, reimplantation of ureters, need for prolonged catheter drainage should urologic injury occur & other procedures as indicated. Unanticipated reactions to anesthesia as well as the small possibility of death were all discussed. All of the patient's questions/concerns were answered. She was encouraged to call me if she has additional questions/concerns prior to surgery. Pt understands & states she wants to proceed w/surgery.

## 2023-03-27 NOTE — PROGRESS NOTES
History and Physical      Artur Fisher:   Physician:  Terri Bowman. Alt, DO    This 41 y.o. female presents today for pre-operative evaluation  Laparoscopic hysterectomy and bilateral salpingectomy scheduled 3/29/23. History: This 41 y.o. No obstetric history on file. patient has history of  dysmenorrhea, enlarged uterus and menorrhagia. Previous treatment includes: Lysteda. She recently had an intussusception that was reduced by Dr. Paco Jordan. OB History          3    Para        Term   2       1    AB        Living   3         SAB        IAB        Ectopic        Molar        Multiple        Live Births                    Past Medical History:   Diagnosis Date    Adverse effect of anesthesia     per pt-- shakes upon awaking    Allergic rhinitis     Alopecia areata     Dr Concepcion Owens: s/p steroid injection    Anemia, iron deficiency     Asthma     dx  age 25---- daily inhaler    Asthma affecting pregnancy in second trimester 2016    albuterol inhaler as needed 2019 at [facility]:  Continues to take Albuterol prn. 2019 at [facility]:  Stable on Albuterol prn. See High Risk Pregnancy Overview    Asthma exacerbation 2018    Depression     Encounter for insertion of mirena IUD 2015    GERD (gastroesophageal reflux disease)     controlled with med    High-risk pregnancy in second trimester 2018 at [facility]: Normal NT, 1.3 mm, Nasal bone seen. Subchorionic hemorrhage currently 2.1 x 4.2 x 3.4 and 3.6 x 1.3 x 3.4. Panorama drawn today---> Low risk, female 2019 at [facility]:  Normal early anatomy. Single [facility] still present, still with bright red bleeding. Very long cervix.  I reassured patient that high chance to resolve, no treatment except bedrest. 2019 at [facility]:  Normal     High-risk pregnancy supervision 2013    History of gastric bypass     initial wt loss 130lbs-- regained approx 20 lbs    Migraine     Nasal polyposis     PUD (peptic ulcer

## 2023-03-28 ENCOUNTER — ANESTHESIA EVENT (OUTPATIENT)
Dept: SURGERY | Age: 38
End: 2023-03-28
Payer: MEDICAID

## 2023-03-29 ENCOUNTER — PREP FOR PROCEDURE (OUTPATIENT)
Dept: OBGYN CLINIC | Age: 38
End: 2023-03-29

## 2023-03-29 ENCOUNTER — HOSPITAL ENCOUNTER (OUTPATIENT)
Age: 38
Setting detail: OUTPATIENT SURGERY
Discharge: HOME | End: 2023-03-29
Attending: OBSTETRICS & GYNECOLOGY | Admitting: OBSTETRICS & GYNECOLOGY
Payer: MEDICAID

## 2023-03-29 ENCOUNTER — ANESTHESIA (OUTPATIENT)
Dept: SURGERY | Age: 38
End: 2023-03-29
Payer: MEDICAID

## 2023-03-29 ENCOUNTER — TELEPHONE (OUTPATIENT)
Dept: OBGYN CLINIC | Age: 38
End: 2023-03-29

## 2023-03-29 VITALS
HEART RATE: 67 BPM | OXYGEN SATURATION: 100 % | BODY MASS INDEX: 32.97 KG/M2 | RESPIRATION RATE: 15 BRPM | DIASTOLIC BLOOD PRESSURE: 80 MMHG | WEIGHT: 174.6 LBS | HEIGHT: 61 IN | SYSTOLIC BLOOD PRESSURE: 108 MMHG | TEMPERATURE: 97.5 F

## 2023-03-29 DIAGNOSIS — N85.2 ENLARGED UTERUS: Primary | ICD-10-CM

## 2023-03-29 DIAGNOSIS — N94.6 DYSMENORRHEA: ICD-10-CM

## 2023-03-29 DIAGNOSIS — N92.0 MENORRHAGIA WITH REGULAR CYCLE: ICD-10-CM

## 2023-03-29 LAB
HCG UR QL: NEGATIVE
HGB BLD-MCNC: 11.8 G/DL (ref 11.7–15.4)
HISTORY CHECK: NORMAL

## 2023-03-29 PROCEDURE — 2580000003 HC RX 258: Performed by: ANESTHESIOLOGY

## 2023-03-29 PROCEDURE — 7100000000 HC PACU RECOVERY - FIRST 15 MIN: Performed by: OBSTETRICS & GYNECOLOGY

## 2023-03-29 PROCEDURE — 3600000004 HC SURGERY LEVEL 4 BASE: Performed by: OBSTETRICS & GYNECOLOGY

## 2023-03-29 PROCEDURE — 7100000011 HC PHASE II RECOVERY - ADDTL 15 MIN: Performed by: OBSTETRICS & GYNECOLOGY

## 2023-03-29 PROCEDURE — 6370000000 HC RX 637 (ALT 250 FOR IP): Performed by: ANESTHESIOLOGY

## 2023-03-29 PROCEDURE — 3700000000 HC ANESTHESIA ATTENDED CARE: Performed by: OBSTETRICS & GYNECOLOGY

## 2023-03-29 PROCEDURE — 2580000003 HC RX 258: Performed by: NURSE ANESTHETIST, CERTIFIED REGISTERED

## 2023-03-29 PROCEDURE — 2709999900 HC NON-CHARGEABLE SUPPLY: Performed by: OBSTETRICS & GYNECOLOGY

## 2023-03-29 PROCEDURE — 7100000010 HC PHASE II RECOVERY - FIRST 15 MIN: Performed by: OBSTETRICS & GYNECOLOGY

## 2023-03-29 PROCEDURE — 2500000003 HC RX 250 WO HCPCS: Performed by: NURSE ANESTHETIST, CERTIFIED REGISTERED

## 2023-03-29 PROCEDURE — 88307 TISSUE EXAM BY PATHOLOGIST: CPT

## 2023-03-29 PROCEDURE — 81025 URINE PREGNANCY TEST: CPT

## 2023-03-29 PROCEDURE — 6360000002 HC RX W HCPCS: Performed by: ANESTHESIOLOGY

## 2023-03-29 PROCEDURE — 7100000001 HC PACU RECOVERY - ADDTL 15 MIN: Performed by: OBSTETRICS & GYNECOLOGY

## 2023-03-29 PROCEDURE — 6360000002 HC RX W HCPCS: Performed by: OBSTETRICS & GYNECOLOGY

## 2023-03-29 PROCEDURE — 6360000002 HC RX W HCPCS: Performed by: REGISTERED NURSE

## 2023-03-29 PROCEDURE — 3600000014 HC SURGERY LEVEL 4 ADDTL 15MIN: Performed by: OBSTETRICS & GYNECOLOGY

## 2023-03-29 PROCEDURE — 3700000001 HC ADD 15 MINUTES (ANESTHESIA): Performed by: OBSTETRICS & GYNECOLOGY

## 2023-03-29 PROCEDURE — 85018 HEMOGLOBIN: CPT

## 2023-03-29 PROCEDURE — 6360000002 HC RX W HCPCS: Performed by: NURSE ANESTHETIST, CERTIFIED REGISTERED

## 2023-03-29 PROCEDURE — 86923 COMPATIBILITY TEST ELECTRIC: CPT

## 2023-03-29 PROCEDURE — 2720000010 HC SURG SUPPLY STERILE: Performed by: OBSTETRICS & GYNECOLOGY

## 2023-03-29 PROCEDURE — 86900 BLOOD TYPING SEROLOGIC ABO: CPT

## 2023-03-29 PROCEDURE — 2500000003 HC RX 250 WO HCPCS: Performed by: REGISTERED NURSE

## 2023-03-29 RX ORDER — KETAMINE HCL IN NACL, ISO-OSM 20 MG/2 ML
SYRINGE (ML) INJECTION
Status: COMPLETED
Start: 2023-03-29 | End: 2023-03-29

## 2023-03-29 RX ORDER — OXYCODONE HYDROCHLORIDE 5 MG/1
5 TABLET ORAL
Status: COMPLETED | OUTPATIENT
Start: 2023-03-29 | End: 2023-03-29

## 2023-03-29 RX ORDER — LIDOCAINE HYDROCHLORIDE 10 MG/ML
1 INJECTION, SOLUTION INFILTRATION; PERINEURAL
Status: DISCONTINUED | OUTPATIENT
Start: 2023-03-29 | End: 2023-03-29 | Stop reason: HOSPADM

## 2023-03-29 RX ORDER — OXYCODONE HYDROCHLORIDE 5 MG/1
5 TABLET ORAL EVERY 6 HOURS PRN
Qty: 15 TABLET | Refills: 0 | Status: SHIPPED | OUTPATIENT
Start: 2023-03-29 | End: 2023-04-05

## 2023-03-29 RX ORDER — FENTANYL CITRATE 50 UG/ML
100 INJECTION, SOLUTION INTRAMUSCULAR; INTRAVENOUS
Status: DISCONTINUED | OUTPATIENT
Start: 2023-03-29 | End: 2023-03-29 | Stop reason: HOSPADM

## 2023-03-29 RX ORDER — DEXAMETHASONE SODIUM PHOSPHATE 10 MG/ML
INJECTION INTRAMUSCULAR; INTRAVENOUS PRN
Status: DISCONTINUED | OUTPATIENT
Start: 2023-03-29 | End: 2023-03-29 | Stop reason: SDUPTHER

## 2023-03-29 RX ORDER — SODIUM CHLORIDE, SODIUM LACTATE, POTASSIUM CHLORIDE, CALCIUM CHLORIDE 600; 310; 30; 20 MG/100ML; MG/100ML; MG/100ML; MG/100ML
INJECTION, SOLUTION INTRAVENOUS CONTINUOUS
Status: DISCONTINUED | OUTPATIENT
Start: 2023-03-29 | End: 2023-03-29 | Stop reason: HOSPADM

## 2023-03-29 RX ORDER — ONDANSETRON 2 MG/ML
INJECTION INTRAMUSCULAR; INTRAVENOUS PRN
Status: DISCONTINUED | OUTPATIENT
Start: 2023-03-29 | End: 2023-03-29 | Stop reason: SDUPTHER

## 2023-03-29 RX ORDER — LIDOCAINE HYDROCHLORIDE 20 MG/ML
INJECTION, SOLUTION EPIDURAL; INFILTRATION; INTRACAUDAL; PERINEURAL PRN
Status: DISCONTINUED | OUTPATIENT
Start: 2023-03-29 | End: 2023-03-29 | Stop reason: SDUPTHER

## 2023-03-29 RX ORDER — MIDAZOLAM HYDROCHLORIDE 2 MG/2ML
2 INJECTION, SOLUTION INTRAMUSCULAR; INTRAVENOUS
Status: COMPLETED | OUTPATIENT
Start: 2023-03-29 | End: 2023-03-29

## 2023-03-29 RX ORDER — SODIUM CHLORIDE 0.9 % (FLUSH) 0.9 %
5-40 SYRINGE (ML) INJECTION EVERY 12 HOURS SCHEDULED
Status: DISCONTINUED | OUTPATIENT
Start: 2023-03-29 | End: 2023-03-29 | Stop reason: HOSPADM

## 2023-03-29 RX ORDER — IPRATROPIUM BROMIDE AND ALBUTEROL SULFATE 2.5; .5 MG/3ML; MG/3ML
1 SOLUTION RESPIRATORY (INHALATION)
Status: DISCONTINUED | OUTPATIENT
Start: 2023-03-29 | End: 2023-03-29 | Stop reason: HOSPADM

## 2023-03-29 RX ORDER — GLYCOPYRROLATE 0.2 MG/ML
INJECTION INTRAMUSCULAR; INTRAVENOUS PRN
Status: DISCONTINUED | OUTPATIENT
Start: 2023-03-29 | End: 2023-03-29 | Stop reason: SDUPTHER

## 2023-03-29 RX ORDER — ONDANSETRON 2 MG/ML
4 INJECTION INTRAMUSCULAR; INTRAVENOUS EVERY 6 HOURS PRN
Status: CANCELLED | OUTPATIENT
Start: 2023-03-29

## 2023-03-29 RX ORDER — SODIUM CHLORIDE 9 MG/ML
INJECTION, SOLUTION INTRAVENOUS PRN
Status: DISCONTINUED | OUTPATIENT
Start: 2023-03-29 | End: 2023-03-29 | Stop reason: HOSPADM

## 2023-03-29 RX ORDER — NEOSTIGMINE METHYLSULFATE 1 MG/ML
INJECTION, SOLUTION INTRAVENOUS PRN
Status: DISCONTINUED | OUTPATIENT
Start: 2023-03-29 | End: 2023-03-29 | Stop reason: SDUPTHER

## 2023-03-29 RX ORDER — KETAMINE HCL IN NACL, ISO-OSM 20 MG/2 ML
SYRINGE (ML) INJECTION PRN
Status: DISCONTINUED | OUTPATIENT
Start: 2023-03-29 | End: 2023-03-29 | Stop reason: SDUPTHER

## 2023-03-29 RX ORDER — PROPOFOL 10 MG/ML
INJECTION, EMULSION INTRAVENOUS PRN
Status: DISCONTINUED | OUTPATIENT
Start: 2023-03-29 | End: 2023-03-29 | Stop reason: SDUPTHER

## 2023-03-29 RX ORDER — SODIUM CHLORIDE 0.9 % (FLUSH) 0.9 %
5-40 SYRINGE (ML) INJECTION PRN
Status: DISCONTINUED | OUTPATIENT
Start: 2023-03-29 | End: 2023-03-29 | Stop reason: HOSPADM

## 2023-03-29 RX ORDER — OXYCODONE HYDROCHLORIDE 5 MG/1
5 TABLET ORAL EVERY 4 HOURS PRN
Status: CANCELLED | OUTPATIENT
Start: 2023-03-29

## 2023-03-29 RX ORDER — SODIUM CHLORIDE, SODIUM LACTATE, POTASSIUM CHLORIDE, CALCIUM CHLORIDE 600; 310; 30; 20 MG/100ML; MG/100ML; MG/100ML; MG/100ML
INJECTION, SOLUTION INTRAVENOUS CONTINUOUS PRN
Status: DISCONTINUED | OUTPATIENT
Start: 2023-03-29 | End: 2023-03-29 | Stop reason: SDUPTHER

## 2023-03-29 RX ORDER — ROCURONIUM BROMIDE 10 MG/ML
INJECTION, SOLUTION INTRAVENOUS PRN
Status: DISCONTINUED | OUTPATIENT
Start: 2023-03-29 | End: 2023-03-29 | Stop reason: SDUPTHER

## 2023-03-29 RX ORDER — ONDANSETRON 4 MG/1
4 TABLET, ORALLY DISINTEGRATING ORAL EVERY 8 HOURS PRN
Status: CANCELLED | OUTPATIENT
Start: 2023-03-29

## 2023-03-29 RX ORDER — ACETAMINOPHEN 500 MG
1000 TABLET ORAL ONCE
Status: COMPLETED | OUTPATIENT
Start: 2023-03-29 | End: 2023-03-29

## 2023-03-29 RX ORDER — IBUPROFEN 600 MG/1
600 TABLET ORAL EVERY 8 HOURS SCHEDULED
Status: CANCELLED | OUTPATIENT
Start: 2023-03-29

## 2023-03-29 RX ORDER — HALOPERIDOL 5 MG/ML
1 INJECTION INTRAMUSCULAR
Status: DISCONTINUED | OUTPATIENT
Start: 2023-03-29 | End: 2023-03-29 | Stop reason: HOSPADM

## 2023-03-29 RX ORDER — IBUPROFEN 800 MG/1
800 TABLET ORAL EVERY 8 HOURS PRN
Qty: 30 TABLET | Refills: 0 | Status: SHIPPED | OUTPATIENT
Start: 2023-03-29

## 2023-03-29 RX ORDER — SODIUM CHLORIDE, SODIUM LACTATE, POTASSIUM CHLORIDE, CALCIUM CHLORIDE 600; 310; 30; 20 MG/100ML; MG/100ML; MG/100ML; MG/100ML
INJECTION, SOLUTION INTRAVENOUS CONTINUOUS
Status: CANCELLED | OUTPATIENT
Start: 2023-03-29

## 2023-03-29 RX ORDER — FENTANYL CITRATE 50 UG/ML
INJECTION, SOLUTION INTRAMUSCULAR; INTRAVENOUS PRN
Status: DISCONTINUED | OUTPATIENT
Start: 2023-03-29 | End: 2023-03-29 | Stop reason: SDUPTHER

## 2023-03-29 RX ORDER — PROCHLORPERAZINE EDISYLATE 5 MG/ML
5 INJECTION INTRAMUSCULAR; INTRAVENOUS
Status: COMPLETED | OUTPATIENT
Start: 2023-03-29 | End: 2023-03-29

## 2023-03-29 RX ORDER — ACETAMINOPHEN 500 MG
1000 TABLET ORAL EVERY 8 HOURS PRN
Status: CANCELLED | OUTPATIENT
Start: 2023-03-29

## 2023-03-29 RX ADMIN — ROCURONIUM BROMIDE 10 MG: 50 INJECTION, SOLUTION INTRAVENOUS at 08:08

## 2023-03-29 RX ADMIN — SODIUM CHLORIDE, POTASSIUM CHLORIDE, SODIUM LACTATE AND CALCIUM CHLORIDE: 600; 310; 30; 20 INJECTION, SOLUTION INTRAVENOUS at 06:33

## 2023-03-29 RX ADMIN — FENTANYL CITRATE 100 MCG: 50 INJECTION, SOLUTION INTRAMUSCULAR; INTRAVENOUS at 07:17

## 2023-03-29 RX ADMIN — SODIUM CHLORIDE, SODIUM LACTATE, POTASSIUM CHLORIDE, AND CALCIUM CHLORIDE: 600; 310; 30; 20 INJECTION, SOLUTION INTRAVENOUS at 08:36

## 2023-03-29 RX ADMIN — MIDAZOLAM HYDROCHLORIDE 2 MG: 1 INJECTION, SOLUTION INTRAMUSCULAR; INTRAVENOUS at 06:52

## 2023-03-29 RX ADMIN — Medication 3 MG: at 09:25

## 2023-03-29 RX ADMIN — ROCURONIUM BROMIDE 20 MG: 50 INJECTION, SOLUTION INTRAVENOUS at 08:35

## 2023-03-29 RX ADMIN — SODIUM CHLORIDE, SODIUM LACTATE, POTASSIUM CHLORIDE, AND CALCIUM CHLORIDE: 600; 310; 30; 20 INJECTION, SOLUTION INTRAVENOUS at 07:10

## 2023-03-29 RX ADMIN — Medication 25 MG: at 07:30

## 2023-03-29 RX ADMIN — HYDROMORPHONE HYDROCHLORIDE 0.5 MG: 1 INJECTION, SOLUTION INTRAMUSCULAR; INTRAVENOUS; SUBCUTANEOUS at 10:01

## 2023-03-29 RX ADMIN — FENTANYL CITRATE 100 MCG: 50 INJECTION, SOLUTION INTRAMUSCULAR; INTRAVENOUS at 09:30

## 2023-03-29 RX ADMIN — PROCHLORPERAZINE EDISYLATE 5 MG: 5 INJECTION INTRAMUSCULAR; INTRAVENOUS at 09:54

## 2023-03-29 RX ADMIN — Medication 15 MG: at 08:35

## 2023-03-29 RX ADMIN — PROPOFOL 150 MG: 10 INJECTION, EMULSION INTRAVENOUS at 07:19

## 2023-03-29 RX ADMIN — HYDROMORPHONE HYDROCHLORIDE 0.5 MG: 1 INJECTION, SOLUTION INTRAMUSCULAR; INTRAVENOUS; SUBCUTANEOUS at 09:46

## 2023-03-29 RX ADMIN — DEXAMETHASONE SODIUM PHOSPHATE 10 MG: 10 INJECTION INTRAMUSCULAR; INTRAVENOUS at 08:24

## 2023-03-29 RX ADMIN — ROCURONIUM BROMIDE 50 MG: 50 INJECTION, SOLUTION INTRAVENOUS at 07:20

## 2023-03-29 RX ADMIN — OXYCODONE 5 MG: 5 TABLET ORAL at 10:26

## 2023-03-29 RX ADMIN — ACETAMINOPHEN 1000 MG: 500 TABLET, FILM COATED ORAL at 06:24

## 2023-03-29 RX ADMIN — ONDANSETRON 4 MG: 2 INJECTION INTRAMUSCULAR; INTRAVENOUS at 08:24

## 2023-03-29 RX ADMIN — GLYCOPYRROLATE 0.6 MG: 0.2 INJECTION INTRAMUSCULAR; INTRAVENOUS at 09:25

## 2023-03-29 RX ADMIN — LIDOCAINE HYDROCHLORIDE 70 MG: 20 INJECTION, SOLUTION EPIDURAL; INFILTRATION; INTRACAUDAL; PERINEURAL at 07:19

## 2023-03-29 RX ADMIN — HYDROMORPHONE HYDROCHLORIDE 0.5 MG: 1 INJECTION, SOLUTION INTRAMUSCULAR; INTRAVENOUS; SUBCUTANEOUS at 09:51

## 2023-03-29 RX ADMIN — Medication 2000 MG: at 07:17

## 2023-03-29 RX ADMIN — HYDROMORPHONE HYDROCHLORIDE 0.5 MG: 1 INJECTION, SOLUTION INTRAMUSCULAR; INTRAVENOUS; SUBCUTANEOUS at 09:56

## 2023-03-29 ASSESSMENT — PAIN DESCRIPTION - PAIN TYPE
TYPE: SURGICAL PAIN

## 2023-03-29 ASSESSMENT — PAIN DESCRIPTION - DESCRIPTORS
DESCRIPTORS: CRAMPING
DESCRIPTORS: CRAMPING
DESCRIPTORS: ACHING
DESCRIPTORS: CRAMPING
DESCRIPTORS: CRAMPING

## 2023-03-29 ASSESSMENT — PAIN DESCRIPTION - LOCATION
LOCATION: ABDOMEN

## 2023-03-29 ASSESSMENT — PAIN - FUNCTIONAL ASSESSMENT
PAIN_FUNCTIONAL_ASSESSMENT: 0-10
PAIN_FUNCTIONAL_ASSESSMENT: 0-10

## 2023-03-29 ASSESSMENT — PAIN SCALES - GENERAL
PAINLEVEL_OUTOF10: 9
PAINLEVEL_OUTOF10: 10
PAINLEVEL_OUTOF10: 7
PAINLEVEL_OUTOF10: 9
PAINLEVEL_OUTOF10: 10

## 2023-03-29 NOTE — DISCHARGE INSTRUCTIONS
the next 24 hours  Do not drive and operate hazardous machinery  Do not make important personal or business decisions  Do not drink alcoholic beverages  If you have not urinated within 8 hours after discharge, and you are experiencing discomfort from urinary retention, please go to the nearest ED. If you have sleep apnea and have a CPAP machine, please use it for all naps and sleeping. Please use caution when taking narcotics and any of your home medications that may cause drowsiness. *  Please give a list of your current medications to your Primary Care Provider. *  Please update this list whenever your medications are discontinued, doses are      changed, or new medications (including over-the-counter products) are added. *  Please carry medication information at all times in case of emergency situations. These are general instructions for a healthy lifestyle:  No smoking/ No tobacco products/ Avoid exposure to second hand smoke  Surgeon General's Warning:  Quitting smoking now greatly reduces serious risk to your health. Obesity, smoking, and sedentary lifestyle greatly increases your risk for illness  A healthy diet, regular physical exercise & weight monitoring are important for maintaining a healthy lifestyle    You may be retaining fluid if you have a history of heart failure or if you experience any of the following symptoms:  Weight gain of 3 pounds or more overnight or 5 pounds in a week, increased swelling in our hands or feet or shortness of breath while lying flat in bed. Please call your doctor as soon as you notice any of these symptoms; do not wait until your next office visit.

## 2023-03-29 NOTE — TELEPHONE ENCOUNTER
Received VM from [address] w/ [facility] who was wanting to let  know that pt is already on oxycodone 15mg maintenance dose from another provider. Pharmacist unsure if you would like to still rx this for pt. Pt was recently sent oxycodone 5mg for surgery today 3/29/23. Confirmed meds w/ pt during prev appt and oxycodone 15mg was not noted by the pt as a current medication.

## 2023-03-29 NOTE — OP NOTE
direct visualization. Survey was taken of the abdomen and pelvis with findings as noted above. Next attention was turned to left uterovarian ligament  which was elevated and broad ligament was dissected  using the harmonic scalpel. The harmonic was used to take down the cardinals and the uterine artery pedicle with excellent hemostasis. Next bladder flap was dissected. Next the right adnexa was taken down in similar fashion to left. About 10 minutes was spent dissecting anterior ab wall adhesion and uterus. Next bladder flap was created. At this time, the harmonic scalpel was used to dissect the vaginal cuff at the apex of the v-care cup. Next the uterus was pulled into the vaginal vault and the cuff was sutured using 0-vicryl in figure of 8 fashion and interrupted on the angles and stratafix was used to close the central defect with excellent hemostasis. Next the pelvic was irrigated with pressure reduced and excellent hemostasis was insured. Attention was then turned to the bladder with cystoscopy performed as above. Attention redirected to pelvis with still great hemostasis. All ports were removed under direct visualization. The skin site over the right lower quadrant site was sutured with 4-0 vicryl. Dermabond was placed over all sites. Pt tolerated the procedure well and was taken to the PACU in stable fashion. Dr mulligan helped operate camera, retract and perform his side of the case.       Signed By: Igor LomelistDO solange     March 29, 2023

## 2023-03-29 NOTE — ANESTHESIA POSTPROCEDURE EVALUATION
Department of Anesthesiology  Postprocedure Note    Patient: Pasquale Nielsen  MRN: [id number]  YOB: 1985  Date of evaluation: 3/29/2023      Procedure Summary     Date: 03/29/23 Room / Location: [facility] MAIN OR 03 / [facility] MAIN OR    Anesthesia Start: 0710 Anesthesia Stop: 5066    Procedure: HYSTERECTOMY ABDOMINAL LAPAROSCOPIC (Abdomen) Diagnosis:       Enlarged uterus      Dysmenorrhea      Menorrhagia      (Enlarged uterus [N85.2])      (Dysmenorrhea [N94.6])      (Menorrhagia [N92.0])    Surgeons: Lynne Rico DO Responsible Provider: Madiha Beavers MD    Anesthesia Type: general ASA Status: 2          Anesthesia Type: No value filed.     Shad Phase I: Shad Score: 10    Shad Phase II: Shad Score: 10      Anesthesia Post Evaluation    Patient location during evaluation: PACU  Patient participation: complete - patient participated  Level of consciousness: awake  Airway patency: patent  Nausea & Vomiting: no nausea and no vomiting  Complications: no  Cardiovascular status: hemodynamically stable  Respiratory status: acceptable, nonlabored ventilation and spontaneous ventilation  Hydration status: euvolemic  Comments: /80   Pulse 67   Temp 97.5 °F (36.4 °C)   Resp 15   Ht 5' 1\" (1.549 m)   Wt 174 lb 9.6 oz (79.2 kg)   SpO2 100%   BMI 32.99 kg/m²     Multimodal analgesia pain management approach

## 2023-03-29 NOTE — PERIOP NOTE
Patient assisted to br upon arrival to phase 2; patient unable to void at this time; mother brought to bedside; call bell within reach, crackers and water provided; instructed to call for assistance if needed.

## 2023-03-29 NOTE — ANESTHESIA PRE PROCEDURE
Department of Anesthesiology  Preprocedure Note       Name:  Katiuska Neri   Age:  41 y.o.  :  1985                                          MRN:  [id number]         Date:  3/29/2023      Surgeon: Karime Escoto):  Jeffery Cagle Alt, DO    Procedure: Procedure(s): HYSTERECTOMY ABDOMINAL LAPAROSCOPIC BILATERAL SALPINGECTOMY    Medications prior to admission:   Prior to Admission medications    Medication Sig Start Date End Date Taking? Authorizing Provider   albuterol sulfate  (90 Base) MCG/ACT inhaler Inhale 2 puffs into the lungs every 4 hours as needed 21   Ar Automatic Reconciliation   LORazepam (ATIVAN) 1 MG tablet Take by mouth every 4 hours as needed.     Ar Automatic Reconciliation   omalizumab (XOLAIR) 150 MG injection Inject into the skin every 14 days    Ar Automatic Reconciliation   omeprazole (PRILOSEC) 40 MG delayed release capsule Take 40 mg by mouth 2 times daily    Ar Automatic Reconciliation   sertraline (ZOLOFT) 100 MG tablet Take 100 mg by mouth daily 19   Ar Automatic Reconciliation   sucralfate (CARAFATE) 1 GM tablet Take 1 g by mouth 4 times daily    Ar Automatic Reconciliation       Current medications:    Current Facility-Administered Medications   Medication Dose Route Frequency Provider Last Rate Last Admin    ceFAZolin (ANCEF) 2000 mg in sterile water 20 mL IV syringe  2,000 mg [facility] Alt, DO        0.9 % sodium chloride infusion   IntraVENous PRN Shiva France MD        lidocaine 1 % injection 1 mL  1 mL IntraDERmal Once PRN Shiva France MD        fentaNYL (SUBLIMAZE) injection 100 mcg  100 mcg IntraVENous Once PRN Shiva France MD        lactated ringers IV soln infusion   IntraVENous Continuous Shiva France  mL/hr at 23 0633 New Bag at 23 1884    sodium chloride flush 0.9 % injection 5-40 mL  5-40 mL IntraVENous 2 times per day Shiva France MD        sodium chloride flush 0.9 % injection 5-40 mL  5-40 mL

## 2023-03-29 NOTE — PROGRESS NOTES
Discharge and follow up instructions reviewed with patient and her mom  voiced understanding  assisted to bathroom  tolerating liquids and crackers without nausea

## 2023-03-31 NOTE — TELEPHONE ENCOUNTER
Spoke w/ pharmacist again, pt picked up oxycodone 15mg 23d supply on 3/16/23 and pharmacist confirmed that she should have enough for 3d post op. Advised pharmacist to hold 's prescription of oxycodone 5mg.

## 2023-04-02 LAB
ABO + RH BLD: NORMAL
BLD PROD TYP BPU: NORMAL
BLOOD BANK DISPENSE STATUS: NORMAL
BLOOD GROUP ANTIBODIES SERPL: NORMAL
BPU ID: NORMAL
CROSSMATCH RESULT: NORMAL
SPECIMEN EXP DATE BLD: NORMAL
UNIT DIVISION: 0

## 2023-04-17 ENCOUNTER — OFFICE VISIT (OUTPATIENT)
Dept: OBGYN CLINIC | Age: 38
End: 2023-04-17

## 2023-04-17 VITALS — BODY MASS INDEX: 31.3 KG/M2 | HEIGHT: 61 IN | WEIGHT: 165.8 LBS

## 2023-04-17 DIAGNOSIS — Z09 POSTOPERATIVE EXAMINATION: Primary | ICD-10-CM

## 2023-04-17 PROCEDURE — 99024 POSTOP FOLLOW-UP VISIT: CPT | Performed by: OBSTETRICS & GYNECOLOGY

## 2023-04-17 NOTE — PROGRESS NOTES
Patient presents today for a post-op visit. She is 2 weeks from Hysterectomy Abdominal  Laparoscopic with bilateral salpingectomy on 3/29/23. She is tolerating a regular diet, passing gas and her pain is under good control. She denies signs of bleeding and infection. Was seen on 4/11/23 and was placed on Bactrim and Flagyl for cuff abscess. Urinalysis showed small bilirubin, moderate blood, 1+ protein, positive nitrite, small leukocytes however patient is asymptomatic. She is having drainage vaginally. No fever or nausea. Ht 5' 1\" (1.549 m)   Wt 165 lb 12.8 oz (75.2 kg)   LMP 02/25/2023 (Approximate)   BMI 31.33 kg/m²     General appearance: no distress  Respiratory: clear to auscultation bilaterally  Cardiovascular: regular heart rate, no murmurs  Abdomen: soft, non-tender, bowel sounds present. Port sites clean /dry and intact. Extremities: no calf pain or tenderness. No redness. Pelvic exam: normal external genitalia, vulva, vagina, cervix, uterus and adnexa, copious watery pink discharge. No odor. Cuff felt with dime size weak area in middle of cuff. Can feel suture. Neurologic: alert and oriented, cranial nerves grossly intact    Plan:vaginal cuff abscess. Draining. Cuff at increased risk of breakdown. Pt is aware. Rtc with increased pressure / bleeding. She is aware. Will continue to see pt weekly.   Likely will need to be out of work 6-8 weeks in total.

## 2023-04-25 ENCOUNTER — TELEPHONE (OUTPATIENT)
Dept: PULMONOLOGY | Age: 38
End: 2023-04-25

## 2023-04-25 NOTE — TELEPHONE ENCOUNTER
Left message to the patient requesting to arrive around 8:20 am to her bereket time since we need to do a breathing test prior to her office visit with aSm Sutton NP, Hal Pena MA

## 2023-04-26 ENCOUNTER — OFFICE VISIT (OUTPATIENT)
Dept: OBGYN CLINIC | Age: 38
End: 2023-04-26

## 2023-04-26 ENCOUNTER — TELEPHONE (OUTPATIENT)
Dept: PULMONOLOGY | Age: 38
End: 2023-04-26

## 2023-04-26 VITALS
WEIGHT: 170.4 LBS | DIASTOLIC BLOOD PRESSURE: 74 MMHG | BODY MASS INDEX: 32.17 KG/M2 | HEIGHT: 61 IN | SYSTOLIC BLOOD PRESSURE: 122 MMHG

## 2023-04-26 DIAGNOSIS — Z09 POSTOP CHECK: Primary | ICD-10-CM

## 2023-04-26 PROCEDURE — 99024 POSTOP FOLLOW-UP VISIT: CPT | Performed by: OBSTETRICS & GYNECOLOGY

## 2023-04-26 NOTE — PROGRESS NOTES
Patient presents today for a post-op visit. She is 4 weeks from Hysterectomy Abdominal  Laparoscopic with bilateral salpingectomy on 3/29/23. She is tolerating a regular diet, passing gas and her pain is under good control. She denies signs of bleeding and infection. Was seen on 4/11/23 and was placed on Bactrim and Flagyl for cuff abscess. Has completed both abx. Reports that she is still having drainage vaginally. No fever or nausea. She is going to work at [employer] as a  with [employer] may 8th. /74   Ht 5' 1\" (1.549 m)   Wt 170 lb 6.4 oz (77.3 kg)   LMP 02/25/2023 (Approximate)   BMI 32.20 kg/m²     General appearance: no distress  Respiratory: clear to auscultation bilaterally  Cardiovascular: regular heart rate, no murmurs  Abdomen: soft, non-tender, bowel sounds present. Port sites clean /dry and intact. Extremities: no calf pain or tenderness. No redness. Pelvic exam: normal external genitalia, vulva, vagina with watery discharge present on speculum exam.  On bimanual exam cuff still weak in middle but has closed mostly. Neurologic: alert and oriented, cranial nerves grossly intact    Anderson Godfrey was seen today for post-op check. Diagnoses and all orders for this visit:    Postop check     S/p vaginal cuff abscess s/p tlh. Doing well. S/p antibiotics. Still pelvic rest and lifting precautions. Rtc 2 weeks. Likely will need pelvic rest another 3-4 weeks. Pt understands this.

## 2023-05-05 ENCOUNTER — OFFICE VISIT (OUTPATIENT)
Dept: OBGYN CLINIC | Age: 38
End: 2023-05-05

## 2023-05-05 VITALS — WEIGHT: 166.8 LBS | DIASTOLIC BLOOD PRESSURE: 60 MMHG | BODY MASS INDEX: 31.52 KG/M2 | SYSTOLIC BLOOD PRESSURE: 92 MMHG

## 2023-05-05 DIAGNOSIS — Z48.89 ENCOUNTER FOR POSTOPERATIVE CARE: Primary | ICD-10-CM

## 2023-05-05 PROCEDURE — 99024 POSTOP FOLLOW-UP VISIT: CPT | Performed by: OBSTETRICS & GYNECOLOGY

## 2023-05-05 NOTE — PROGRESS NOTES
Mook Raymond presents for postop visit from Hysterectomy Abdominal  Laparoscopic with bilateral salpingectomy on 3/29/23. about 5 weeks ago. Do ok since last appointment. Complains of drainage leaking from port site. .  Fever: NO Voiding well: YES. Bowel movements OK: YES. Wt 166 lb 12.8 oz (75.7 kg)   LMP 02/25/2023 (Approximate)   BMI 31.52 kg/m²     Exam: A&OX3, NAD. Abdomen:  Non tender with suture loose in rlq port. Very mild rind of erythema around port site. No enduration or purulent drainage. Suture clipped. 1-2mm opening at edge. A/P. Stable Post op condition. Pelvic rest and lifting precautions still. Rtc 2 weeks. Advised pt to wash port. Will defer antibiotics at this time. Call with fever  /  redness.

## 2023-05-17 ENCOUNTER — OFFICE VISIT (OUTPATIENT)
Dept: OBGYN CLINIC | Age: 38
End: 2023-05-17

## 2023-05-17 VITALS — BODY MASS INDEX: 31.29 KG/M2 | WEIGHT: 165.6 LBS

## 2023-05-17 DIAGNOSIS — Z98.890 POST-OPERATIVE STATE: Primary | ICD-10-CM

## 2023-05-17 PROCEDURE — 99024 POSTOP FOLLOW-UP VISIT: CPT | Performed by: STUDENT IN AN ORGANIZED HEALTH CARE EDUCATION/TRAINING PROGRAM

## 2023-05-17 NOTE — PROGRESS NOTES
port. Will defer antibiotics at this time. - Post-op visit #5. Seen by Dr Mary Talavera on 5/17/23. Pt presents with heavy vaginal bleeding with clots earlier today, now resolved. On exam, cuff has mild separation in the midline, granulation tissue present, appears to be healing secondarily from the base. Bleeding likely from granulation tissue. Minimal bleeding noted during exam. Silver nitrate applied to area for hemostasis and to help with healing. RTO 1 week for repeat pelvic exam.     Wt 165 lb 9.6 oz (75.1 kg)   LMP 02/25/2023 (Approximate)   BMI 31.29 kg/m²     Exam: A&OX3, NAD. Abdomen:  Non tender Incisions clean, dry, intact  Pelvic: cuff healing secondarily in the midline with granulation tissue present. A/P. Stable Post op condition. Gradually increase activity. Resumption of sexual activity is not encouraged at this time. Follow up 1 weeks.       Cali Ceballos MD  [facility]

## 2023-05-30 ENCOUNTER — TELEPHONE (OUTPATIENT)
Dept: OBGYN CLINIC | Age: 38
End: 2023-05-30

## 2023-05-30 NOTE — TELEPHONE ENCOUNTER
Gyn patient called complaining of vaginal bleeding. States she attempt to have intercourse on Sunday and since then she's had vaginal bleeding. Bleeding at first was heavy but now just light flow. S/P Hysterectomy on 3/29/23. Treated on 4/11/23 for cuff abscess. Patient has not been cleared by Dr. Shyanne Tang to have intercourse yet. Spoke with Dr. Shyanne Tang about patients concerns and okay to keep scheduled appointment for 5/31/23. Can call if bleeding worsens.

## 2023-05-31 ENCOUNTER — OFFICE VISIT (OUTPATIENT)
Dept: OBGYN CLINIC | Age: 38
End: 2023-05-31
Payer: MEDICAID

## 2023-05-31 VITALS
SYSTOLIC BLOOD PRESSURE: 116 MMHG | BODY MASS INDEX: 30.47 KG/M2 | HEIGHT: 61 IN | WEIGHT: 161.4 LBS | DIASTOLIC BLOOD PRESSURE: 72 MMHG

## 2023-05-31 DIAGNOSIS — Z98.890 POST-OPERATIVE STATE: Primary | ICD-10-CM

## 2023-05-31 DIAGNOSIS — B96.89 BACTERIAL VAGINOSIS: ICD-10-CM

## 2023-05-31 DIAGNOSIS — N76.0 BACTERIAL VAGINOSIS: ICD-10-CM

## 2023-05-31 DIAGNOSIS — N89.8 VAGINAL DISCHARGE: ICD-10-CM

## 2023-05-31 DIAGNOSIS — L92.9 GRANULATION TISSUE: ICD-10-CM

## 2023-05-31 LAB
BACTERIA, WET MOUNT, POC: NEGATIVE
CLUE CELLS, WET MOUNT, POC: PRESENT
RBC WET MOUNT, POC: NEGATIVE
TRICH, WET MOUNT, POC: NORMAL
WBC, WET MOUNT, POC: NORMAL
YEAST, WET MOUNT, POC: NORMAL

## 2023-05-31 PROCEDURE — 87210 SMEAR WET MOUNT SALINE/INK: CPT | Performed by: OBSTETRICS & GYNECOLOGY

## 2023-05-31 PROCEDURE — 17250 CHEM CAUT OF GRANLTJ TISSUE: CPT | Performed by: OBSTETRICS & GYNECOLOGY

## 2023-05-31 PROCEDURE — 99213 OFFICE O/P EST LOW 20 MIN: CPT | Performed by: OBSTETRICS & GYNECOLOGY

## 2023-05-31 RX ORDER — FLUCONAZOLE 150 MG/1
150 TABLET ORAL
Qty: 2 TABLET | Refills: 0 | Status: SHIPPED | OUTPATIENT
Start: 2023-05-31 | End: 2023-06-06

## 2023-05-31 RX ORDER — METRONIDAZOLE 500 MG/1
500 TABLET ORAL 2 TIMES DAILY
Qty: 20 TABLET | Refills: 0 | Status: SHIPPED | OUTPATIENT
Start: 2023-05-31 | End: 2023-06-10

## 2023-05-31 NOTE — PROGRESS NOTES
Pt had episode of bleeding yesterday after intercourse. Reports she is still having light spotting today. Some cramping but not severe pain. Denies s/sx of infection.
Post-op visit #4. She was seen by Dr. Colletta Em on 5/5/23 and pelvic exam was done. Noted: Non tender with suture loose in rlq port. Very mild rind of erythema around port site. No enduration or purulent drainage. Suture clipped. 1-2mm opening at edge. Plan: Stable Post op condition. Pelvic rest and lifting precautions still. Rtc 2 weeks. Advised pt to wash port. Will defer antibiotics at this time. - Post-op visit #5. Seen by Dr Jessy Wolfe on 5/17/23. Pt presents with heavy vaginal bleeding with clots earlier today, now resolved. On exam, cuff has mild separation in the midline, granulation tissue present, appears to be healing secondarily from the base. Bleeding likely from granulation tissue. Minimal bleeding noted during exam. Silver nitrate applied to area for hemostasis and to help with healing. RTO 1 week for repeat pelvic exam.      LMP 02/25/2023 (Approximate)     General appearance: no distress    Pelvic exam: normal external genitalia, vulva, vagina with green discharge seen coming from cuff. Cuff visually intact with granulation present about 1cm. Tender to touch with qtip. Silver nitrate applied to granulation tissue. Wet prep with white cells / clue cells. Neurologic: alert and oriented, cranial nerves grossly intact    Plan: pt must have pelvic rest until healed. Will add flagyl to her doxcycline. Rtc 2 weeks.   Hopefully will be healed in 1 month

## 2023-07-03 ENCOUNTER — TELEPHONE (OUTPATIENT)
Dept: PULMONOLOGY | Age: 38
End: 2023-07-03

## 2023-07-03 ENCOUNTER — OFFICE VISIT (OUTPATIENT)
Dept: PULMONOLOGY | Age: 38
End: 2023-07-03
Payer: MEDICAID

## 2023-07-03 VITALS
BODY MASS INDEX: 29.27 KG/M2 | HEIGHT: 61 IN | DIASTOLIC BLOOD PRESSURE: 70 MMHG | OXYGEN SATURATION: 100 % | RESPIRATION RATE: 18 BRPM | TEMPERATURE: 98.7 F | HEART RATE: 101 BPM | SYSTOLIC BLOOD PRESSURE: 118 MMHG | WEIGHT: 155 LBS

## 2023-07-03 DIAGNOSIS — J45.51 SEVERE PERSISTENT ASTHMA WITH EXACERBATION: Primary | ICD-10-CM

## 2023-07-03 DIAGNOSIS — J20.9 ACUTE BRONCHITIS, UNSPECIFIED ORGANISM: ICD-10-CM

## 2023-07-03 DIAGNOSIS — J30.89 OTHER ALLERGIC RHINITIS: ICD-10-CM

## 2023-07-03 PROCEDURE — 99214 OFFICE O/P EST MOD 30 MIN: CPT | Performed by: NURSE PRACTITIONER

## 2023-07-03 PROCEDURE — 96372 THER/PROPH/DIAG INJ SC/IM: CPT | Performed by: NURSE PRACTITIONER

## 2023-07-03 RX ORDER — ALBUTEROL SULFATE 90 UG/1
2 AEROSOL, METERED RESPIRATORY (INHALATION) EVERY 4 HOURS PRN
Qty: 1 EACH | Refills: 11 | Status: SHIPPED | OUTPATIENT
Start: 2023-07-03

## 2023-07-03 RX ORDER — ALBUTEROL SULFATE 2.5 MG/3ML
2.5 SOLUTION RESPIRATORY (INHALATION) EVERY 6 HOURS PRN
Qty: 360 ML | Refills: 3 | Status: SHIPPED | OUTPATIENT
Start: 2023-07-03

## 2023-07-03 RX ORDER — DEXAMETHASONE SODIUM PHOSPHATE 10 MG/ML
8 INJECTION INTRAMUSCULAR; INTRAVENOUS ONCE
Status: COMPLETED | OUTPATIENT
Start: 2023-07-03 | End: 2023-07-03

## 2023-07-03 RX ORDER — FLUTICASONE PROPIONATE AND SALMETEROL 250; 50 UG/1; UG/1
POWDER RESPIRATORY (INHALATION)
Qty: 1 EACH | Refills: 11 | Status: SHIPPED | OUTPATIENT
Start: 2023-07-03

## 2023-07-03 RX ORDER — DOXYCYCLINE HYCLATE 100 MG
100 TABLET ORAL 2 TIMES DAILY
Qty: 14 TABLET | Refills: 0 | Status: SHIPPED | OUTPATIENT
Start: 2023-07-03 | End: 2023-07-10

## 2023-07-03 RX ORDER — PREDNISONE 20 MG/1
TABLET ORAL
Qty: 15 TABLET | Refills: 0 | Status: SHIPPED | OUTPATIENT
Start: 2023-07-03

## 2023-07-03 RX ADMIN — DEXAMETHASONE SODIUM PHOSPHATE 8 MG: 10 INJECTION INTRAMUSCULAR; INTRAVENOUS at 15:20

## 2023-07-03 ASSESSMENT — ENCOUNTER SYMPTOMS
WHEEZING: 1
SPUTUM PRODUCTION: 1
HEMOPTYSIS: 0
COUGH: 1
SHORTNESS OF BREATH: 1

## 2023-07-03 NOTE — TELEPHONE ENCOUNTER
TRIAGE CALL      Complaint: cough /congestion  Cough: cough green/yellow  Productive:  yes  Bloody Sputum:  no  Increased SOB/Wheezing:  yes both  Duration: 5 days   Fever/Chills: no  OTC Meds tried: musinex

## 2023-07-03 NOTE — PROGRESS NOTES
[address] [facility]. [facility], [address] [place] [address]  [phone]    Patient Name:  Yvonne Lopez    YOB: 1985    Office Visit 7/3/2023      CHIEF COMPLAINT:      Chief Complaint   Patient presents with    Other     Work in Asthma    Asthma         ASSESSMENT:   (Medical Decision Making)                                                                                                                                          Encounter Diagnoses   Name Primary? Severe persistent asthma with exacerbation Yes    Acute bronchitis, unspecified organism     Other allergic rhinitis      Recent wheezing, cough productive of purulent sputum, shortness of breath. She has been noncompliant with follow-up and maintenance medication and therefore biologic therapy has been denied by insurance. She is using albuterol every 4 hours with some response but still having symptoms. Allergy symptoms are currently controlled. PLAN:     Dexamethasone 8 mg IM was administered. Prednisone taper. Begin Advair 250/50, 1 inhalation twice daily. Albuterol inhaler or nebulizer 4 times daily as needed. OTC mucolytic, (mucinex or generic equivalent of guaifenesin), 2400mg in 24 hours in divided doses as needed to thin mucous. Doxycycline 100 mg twice daily for 7 days. She will follow-up in 6 weeks. At that time we will obtain spirometry, IgE level and absolute eosinophil count. Discussed alternative biologic therapy self inject (instead of Advair) for allergic asthma or eosinophilic asthma. Orders Placed This Encounter   Medications    dexamethasone (DECADRON) injection 8 mg    predniSONE (DELTASONE) 20 MG tablet     Si po q am pc for 3 days, 1 and 1/2 for 3 days, 1 for 3 days, 1/2 for 3 days, then stop or as directed.      Dispense:  15 tablet     Refill:  0           LIS DAVIDSON, PAPITO - CNP    Total  time spent with patient -21

## 2023-07-03 NOTE — TELEPHONE ENCOUNTER
Patient has missed last 4 scheduled appointments. Was last seen 6/2022 by Mrs Awa Perez denied by insurance due to not filling maintenance inhaler routinely. Advair 250/50, Singulair. I have spoken with patient. She reports wheezing, cough, increased SOB. Hoarse voice on phone. No fever. She is coughing up thick yellow/green sputum. She reports it hurts when she coughs. She is currently not on Xolair. She stopped Advair when she was taking Xolair. She is taking Albuterol inhaler every 6 hours. She reports that her children were tested for COVID, flu and strep last week and negative. She is taking Mucinex D per package direction. I have spoken with Mrs Ferrell Sandifer who approves work in appointment today to treat possible asthma exacerbation. Per NP, patient will need follow up appointment in 2 months to review re-assess for Xolair. Patient is aware that while awaiting for her arrival today will schedule subsequent appointment.

## 2023-07-03 NOTE — PROGRESS NOTES
She is a 72-year-old female with severe persistent asthma, previously on Xolair therapy, has had issues with insurance reimbursement due to lack of adherence to maintenance therapy while on Xolair, seen as a work in visit today secondary to wheezing, shortness of breath, cough productive of purulent sputum. She has been using albuterol inhaler every 6 hours. She has missed last 4 scheduled appointments. DIAGNOSTICS:     Spirometry 9/7/2016-moderately severe obstructive defect. Sinus CT 7/2017 - FINDINGS: There is extensive bilateral paranasal sinus disease. The maxillary and ethmoid sinuses are almost completely opacified bilaterally. There is also opacification of the left sphenoid sinus. The left frontal sinus and right sphenoid sinus are partially opacified. The patient has probably had bilateral sinus surgery. The maxillary ostia are widened. There are no fractures. There is no significant septal deviation. IMPRESSION: Extensive bilateral paranasal sinus disease. Spirometry 9/20/2019- normal.  IgE level of 255 and + allergen panel  CXR 2/18/2020 ER-lungs are clear, no acute process. CXR 12/21/2020-lungs are clear. No acute process. CXR 8/29/2021-ER-clear lungs, no acute process.

## 2023-07-03 NOTE — PATIENT INSTRUCTIONS
Dexamethasone 8 mg IM was administered. Prednisone taper. Begin Advair 250/50, 1 inhalation twice daily. Albuterol inhaler or nebulizer 4 times daily as needed. Doxycycline 100 mg twice daily for 7 days. OTC mucolytic, (mucinex or generic equivalent of guaifenesin), 2400mg in 24 hours in divided doses as needed to thin mucous. She will follow-up in 6 weeks. At that time we will obtain spirometry, IgE level and absolute eosinophil count. Discussed alternative biologic therapy self inject (instead of Advair) for allergic asthma or eosinophilic asthma.

## 2023-08-30 DIAGNOSIS — J45.51 SEVERE PERSISTENT ASTHMA WITH EXACERBATION: Primary | ICD-10-CM

## 2023-08-30 DIAGNOSIS — J20.9 ACUTE BRONCHITIS, UNSPECIFIED ORGANISM: ICD-10-CM

## 2023-08-30 DIAGNOSIS — J30.9 ALLERGIC RHINITIS, UNSPECIFIED SEASONALITY, UNSPECIFIED TRIGGER: ICD-10-CM

## 2023-09-27 ENCOUNTER — TELEPHONE (OUTPATIENT)
Dept: OBGYN CLINIC | Age: 38
End: 2023-09-27

## 2023-10-10 ENCOUNTER — OFFICE VISIT (OUTPATIENT)
Dept: ENT CLINIC | Age: 38
End: 2023-10-10
Payer: MEDICAID

## 2023-10-10 VITALS — HEIGHT: 61 IN | BODY MASS INDEX: 28.7 KG/M2 | WEIGHT: 152 LBS

## 2023-10-10 DIAGNOSIS — J32.0 CHRONIC MAXILLARY SINUSITIS: Primary | Chronic | ICD-10-CM

## 2023-10-10 DIAGNOSIS — J33.9 NASAL POLYP: Chronic | ICD-10-CM

## 2023-10-10 PROCEDURE — 99214 OFFICE O/P EST MOD 30 MIN: CPT | Performed by: PHYSICIAN ASSISTANT

## 2023-10-10 PROCEDURE — 31231 NASAL ENDOSCOPY DX: CPT | Performed by: PHYSICIAN ASSISTANT

## 2023-10-10 RX ORDER — PREDNISONE 20 MG/1
20 TABLET ORAL 2 TIMES DAILY
Qty: 10 TABLET | Refills: 1 | Status: SHIPPED | OUTPATIENT
Start: 2023-10-10 | End: 2023-10-15

## 2023-10-10 NOTE — PROGRESS NOTES
The patient is a 41 y.o. [id number] who is seen for STD screening. Reports she has new sexual partner and would like screening. C/o intermittent vaginal odor. States it is most prominent after intercourse. Denies vaginal itching, burning, abnormal discharge. Denies urinary symptoms. HISTORY:    [id number]  Patient's last menstrual period was 02/25/2023 (approximate). Sexual History:  Sexually active   Contraception:  Hyst   Current Outpatient Medications on File Prior to Visit   Medication Sig Dispense Refill    predniSONE (DELTASONE) 20 MG tablet Take 1 tablet by mouth 2 times daily for 5 days 10 tablet 1    fluticasone-salmeterol (ADVAIR DISKUS) 250-50 MCG/ACT AEPB diskus inhaler 1 inhalation twice daily, rinse mouth after use 1 each 11    albuterol sulfate HFA (PROVENTIL;VENTOLIN;PROAIR) 108 (90 Base) MCG/ACT inhaler Inhale 2 puffs into the lungs every 4 hours as needed for Wheezing or Shortness of Breath 1 each 11    albuterol (PROVENTIL) (2.5 MG/3ML) 0.083% nebulizer solution Take 3 mLs by nebulization every 6 hours as needed for Wheezing or Shortness of Breath 360 mL 3    zolpidem (AMBIEN) 10 MG tablet Take 1 tablet by mouth nightly. at bedtime      methylphenidate 36 MG CR tablet Take 1 tablet by mouth every morning. LORazepam (ATIVAN) 1 MG tablet Take by mouth every 4 hours as needed. omeprazole (PRILOSEC) 40 MG delayed release capsule Take 1 capsule by mouth 2 times daily      sucralfate (CARAFATE) 1 GM tablet Take 1 tablet by mouth 4 times daily      predniSONE (DELTASONE) 20 MG tablet 2 po q am pc for 3 days, 1 and 1/2 for 3 days, 1 for 3 days, 1/2 for 3 days, then stop or as directed.  (Patient not taking: Reported on 10/10/2023) 15 tablet 0    omalizumab (XOLAIR) 150 MG injection Inject into the skin every 14 days (Patient not taking: Reported on 10/11/2023)      sertraline (ZOLOFT) 100 MG tablet Take 1 tablet by mouth daily (Patient not taking: Reported on 10/10/2023)       No current

## 2023-10-10 NOTE — PROGRESS NOTES
Right Ear:  Canals is normal, Tympanic membrane with normal landmarks and normal mobility, no retraction, inflammation, effusion. Left  Ear: Canal is normal, Tympanic membrane with normal landmarks and normal mobility, no retraction, inflammation, effusion. Nose/Nasal Cavity: Nasal mucosa is pink/ moist.  Nasal septum is midline. Inferior turbinates are Hypertrophic. Both nasal passages are congested, with polyps and green abnormal drainage. Lips/Gums/Teeth: Inspection of lips, gums and teeth are normal  Oral Cavity: somewhat dry oral mucosa, no visualized ulcers, masses or other lesions,  Palate normal, Tongue normal, Floor of mouth normal. Mallampati Class 1 . Oropharynx: Oropharynx normal and unobstructed, tonsils are surgically absent   , no lesion or inflammation. Neck/Thyroid: Normal appearance without mass, Trachea midline, No lymphadenopathy, No enlargement, tenderness or mass of thyroid noted. Procedure: Procedure nasal endoscopy :  ENT Nasal/Sinus Endoscopy/Nasopharyngoscopy:    Informed Consent: Consent was obtained    Anesthesia/Decongestant: Phenylephrine, Lidocaine as indicated    Procedure: Due to the limitations of anterior rhinoscopy for adequate visualization of the posterior nasal cavity/cavities, sinus area(s), and/or nasopharynx, nasal and sinus endoscopy was performed. Unless otherwise specified, interior of the nasal cavity and the inferior, middle and superior meatus/meati, the turbinates, the spheno-ethmoid recess, and the nasopharynx were all inspected. Pertinent positives are noted in exam findings.     Findings:  Nasal/sinus endoscopy: Endoscopic examination of the nose was done under local anesthesia with topical phenylephrine and lidocaine solution as needed, septum-- no deviation, inferior turbinate hypertrophy before topical decongestant normal,  middle/superior turbinate-- medialized with polyp, inferior/middle/superior meatus-obstructed with mucopus and polyps

## 2023-10-11 ENCOUNTER — OFFICE VISIT (OUTPATIENT)
Dept: OBGYN CLINIC | Age: 38
End: 2023-10-11
Payer: MEDICAID

## 2023-10-11 VITALS
DIASTOLIC BLOOD PRESSURE: 64 MMHG | BODY MASS INDEX: 28.79 KG/M2 | WEIGHT: 152.5 LBS | SYSTOLIC BLOOD PRESSURE: 112 MMHG | HEIGHT: 61 IN

## 2023-10-11 DIAGNOSIS — B96.89 BACTERIAL VAGINOSIS: ICD-10-CM

## 2023-10-11 DIAGNOSIS — N76.0 BACTERIAL VAGINOSIS: ICD-10-CM

## 2023-10-11 DIAGNOSIS — Z11.3 SCREEN FOR SEXUALLY TRANSMITTED DISEASES: ICD-10-CM

## 2023-10-11 DIAGNOSIS — N89.8 VAGINAL ODOR: Primary | ICD-10-CM

## 2023-10-11 DIAGNOSIS — N89.8 VAGINAL DISCHARGE: ICD-10-CM

## 2023-10-11 LAB
BACTERIA, WET MOUNT, POC: NORMAL
CLUE CELLS, WET MOUNT, POC: PRESENT
RBC WET MOUNT, POC: NEGATIVE
TRICH, WET MOUNT, POC: NORMAL
WBC, WET MOUNT, POC: NEGATIVE
YEAST, WET MOUNT, POC: NORMAL

## 2023-10-11 PROCEDURE — 99214 OFFICE O/P EST MOD 30 MIN: CPT | Performed by: NURSE PRACTITIONER

## 2023-10-11 PROCEDURE — 87210 SMEAR WET MOUNT SALINE/INK: CPT | Performed by: NURSE PRACTITIONER

## 2023-10-11 RX ORDER — METRONIDAZOLE 500 MG/1
500 TABLET ORAL 2 TIMES DAILY
Qty: 14 TABLET | Refills: 0 | Status: SHIPPED | OUTPATIENT
Start: 2023-10-11 | End: 2023-10-18

## 2023-10-13 LAB
A VAGINAE DNA VAG QL NAA+PROBE: ABNORMAL SCORE
BACTERIA SPEC CULT: NORMAL
BVAB2 DNA VAG QL NAA+PROBE: ABNORMAL SCORE
C ALBICANS DNA VAG QL NAA+PROBE: NEGATIVE
C GLABRATA DNA VAG QL NAA+PROBE: NEGATIVE
C TRACH RRNA SPEC QL NAA+PROBE: NEGATIVE
GRAM STN SPEC: NORMAL
GRAM STN SPEC: NORMAL
MEGA1 DNA VAG QL NAA+PROBE: ABNORMAL SCORE
N GONORRHOEA RRNA SPEC QL NAA+PROBE: NEGATIVE
SERVICE CMNT-IMP: NORMAL
SPECIMEN SOURCE: ABNORMAL
T VAGINALIS RRNA SPEC QL NAA+PROBE: NEGATIVE

## 2023-10-31 ENCOUNTER — TELEPHONE (OUTPATIENT)
Dept: PULMONOLOGY | Age: 38
End: 2023-10-31

## 2023-10-31 NOTE — TELEPHONE ENCOUNTER
TRIAGE CALL      Complaint: Asthma Flare up  Cough: yes  Productive:  sometimes   Bloody Sputum:  no  Increased SOB/Wheezing:  yes/both   Duration: Last Wednesday   Fever/Chills: no  OTC Meds tried: no      Has been using her rescue inhaler .  Says she has some prednisone but has not started those yet

## 2023-11-01 ENCOUNTER — OFFICE VISIT (OUTPATIENT)
Dept: ENT CLINIC | Age: 38
End: 2023-11-01
Payer: MEDICAID

## 2023-11-01 VITALS — WEIGHT: 158.6 LBS | HEART RATE: 84 BPM | RESPIRATION RATE: 14 BRPM | HEIGHT: 61 IN | BODY MASS INDEX: 29.94 KG/M2

## 2023-11-01 DIAGNOSIS — J33.9 NASAL POLYP: ICD-10-CM

## 2023-11-01 DIAGNOSIS — J32.0 CHRONIC MAXILLARY SINUSITIS: Primary | ICD-10-CM

## 2023-11-01 PROCEDURE — 31231 NASAL ENDOSCOPY DX: CPT | Performed by: PHYSICIAN ASSISTANT

## 2023-11-01 PROCEDURE — 99214 OFFICE O/P EST MOD 30 MIN: CPT | Performed by: PHYSICIAN ASSISTANT

## 2023-11-01 RX ORDER — DOXYCYCLINE 100 MG/1
100 TABLET ORAL 2 TIMES DAILY
Qty: 14 TABLET | Refills: 0 | Status: SHIPPED | OUTPATIENT
Start: 2023-11-01 | End: 2023-11-08

## 2023-11-01 RX ORDER — DOXYCYCLINE HYCLATE 100 MG
100 TABLET ORAL 2 TIMES DAILY
Qty: 14 TABLET | Refills: 0 | Status: SHIPPED | OUTPATIENT
Start: 2023-11-01 | End: 2023-11-08

## 2023-11-01 RX ORDER — ONDANSETRON 8 MG/1
TABLET, ORALLY DISINTEGRATING ORAL
COMMUNITY
Start: 2023-10-24

## 2023-11-01 RX ORDER — PREDNISONE 20 MG/1
20 TABLET ORAL DAILY
Qty: 5 TABLET | Refills: 0 | Status: SHIPPED | OUTPATIENT
Start: 2023-11-01 | End: 2023-11-06

## 2023-11-01 RX ORDER — DOXYCYCLINE HYCLATE 100 MG
100 TABLET ORAL 2 TIMES DAILY
Qty: 42 TABLET | Refills: 0 | Status: SHIPPED | OUTPATIENT
Start: 2023-11-01 | End: 2023-11-22

## 2023-11-01 RX ORDER — CLONAZEPAM 1 MG/1
TABLET ORAL
COMMUNITY
Start: 2023-10-31

## 2023-11-01 RX ORDER — METHYLPHENIDATE HYDROCHLORIDE 72 MG/1
1 TABLET, EXTENDED RELEASE ORAL EVERY MORNING
COMMUNITY
Start: 2023-10-07

## 2023-11-01 ASSESSMENT — ENCOUNTER SYMPTOMS
ABDOMINAL PAIN: 0
SINUS PAIN: 1
SINUS PRESSURE: 1
SHORTNESS OF BREATH: 0
SORE THROAT: 0

## 2023-11-01 NOTE — TELEPHONE ENCOUNTER
Last seen:  Hx: severe asthma, h/o clots, allergic rhinits, GERD    Note that patient seen by ENT today for ongoing sinus infection, plan: \"Will proceed with abx, ct sinus continue sinus rinses medicated. Will have pt follow up with Dr. Jens Tom to review and discuss treatment options, likely surgical planning. \"    Patient call reporting asthma flare up, productive cough w/ sob & wheezing, has been using rescue inhaler, holding off on steroids but calling for recommendations. Contacted patient regarding s/s, no fever, using albuterol inhaler about every 4-6 hours, has nebulizer but using inhaler more often, does have some relief but short term, notes was started on Doxy today by ENT; sputum is green feels like coughing out sinus drainage and chest congestion. Confirmed local pharmacy. Please advise for next step, asking for prednisone.

## 2023-11-13 ENCOUNTER — HOSPITAL ENCOUNTER (OUTPATIENT)
Dept: CT IMAGING | Age: 38
Discharge: HOME | End: 2023-11-15
Payer: MEDICAID

## 2023-11-13 DIAGNOSIS — J32.0 CHRONIC MAXILLARY SINUSITIS: ICD-10-CM

## 2023-11-13 DIAGNOSIS — J33.9 NASAL POLYP: ICD-10-CM

## 2023-11-13 PROCEDURE — 70486 CT MAXILLOFACIAL W/O DYE: CPT

## 2023-11-28 ENCOUNTER — TELEPHONE (OUTPATIENT)
Dept: PULMONOLOGY | Age: 38
End: 2023-11-28

## 2023-11-28 NOTE — TELEPHONE ENCOUNTER
Patient says that she had had 2 allergic reaction within the week . She had  2 injections and the inhaler Eppe . She asking about the test she is about to have for the [address] [place] [address] .  She says she thought she had to be off steroids for 6 wks  before the test . Ask for a call to let her know something

## 2023-11-29 ENCOUNTER — OFFICE VISIT (OUTPATIENT)
Dept: ENT CLINIC | Age: 38
End: 2023-11-29
Payer: MEDICAID

## 2023-11-29 VITALS — BODY MASS INDEX: 29.64 KG/M2 | HEIGHT: 61 IN | RESPIRATION RATE: 18 BRPM | WEIGHT: 157 LBS

## 2023-11-29 DIAGNOSIS — J33.9 NASAL POLYPOSIS: ICD-10-CM

## 2023-11-29 DIAGNOSIS — J32.4 CHRONIC PANSINUSITIS: Primary | ICD-10-CM

## 2023-11-29 PROCEDURE — 99213 OFFICE O/P EST LOW 20 MIN: CPT | Performed by: OTOLARYNGOLOGY

## 2023-11-29 RX ORDER — EPINEPHRINE 0.3 MG/.3ML
INJECTION SUBCUTANEOUS
COMMUNITY
Start: 2023-11-28

## 2023-11-29 RX ORDER — NYSTATIN 100000 U/G
OINTMENT TOPICAL
COMMUNITY
Start: 2023-11-22

## 2023-11-29 ASSESSMENT — ENCOUNTER SYMPTOMS
ABDOMINAL PAIN: 0
SHORTNESS OF BREATH: 0
SORE THROAT: 0

## 2023-11-29 NOTE — TELEPHONE ENCOUNTER
Patient is scheduled for appt 12/4 to start xolair process. Has called in to report recent issues and concerned she hasn't been off of steroids long enough. Please advise.

## 2023-11-29 NOTE — TELEPHONE ENCOUNTER
Received message from Dr. Seven Villalta regarding [facility]/  [facility] for chronic, recurrent sinusitis, nasal polyps and asthma standpoint. Agree that this is a good option for her and likely will not face the issues that she has had with Xolair, primarily from compliance standpoint. She has also had issues obtaining IgE, absolute eosinophil count due to repeated courses of steroids.

## 2023-11-29 NOTE — TELEPHONE ENCOUNTER
She has missed 2 appts since her last visit, needs to keep 12/4 appt, will decide timing of labs to requalify for biologic agent at that time. Previously on xolair, has been denied because there has been issue with compliance with maintenance therapy. She has been in ER due to \"allergic rxn\" to unknown source. Was given steroid injection. This cycle is likely to repeat until she resumes biologic agent, whether it is xolair or alternative.

## 2023-11-29 NOTE — PROGRESS NOTES
High-risk pregnancy in second trimester 2018 at [facility]: Normal NT, 1.3 mm, Nasal bone seen. Subchorionic hemorrhage currently 2.1 x 4.2 x 3.4 and 3.6 x 1.3 x 3.4. Panorama drawn today---> Low risk, female 2019 at [facility]:  Normal early anatomy. Single large [facility] still present, still with bright red bleeding. Very long cervix. I reassured patient that high chance to resolve, no treatment except bedrest. 2019 at [facility]:  Normal     High-risk pregnancy supervision 2013    History of gastric bypass     initial wt loss 130lbs-- regained approx 20 lbs    Migraine     Nasal polyposis     PUD (peptic ulcer disease)     hx of GI ulcers since     Seizures ([address]) onset 2020    last one 1 week ago-- followed by dr. Phoebe Magaña hemorrhage in second trimester 2018 at [facility]:  [facility] at internal os resolved. [facility] right lateral measuring 7.01 x 4.20 x 6.91 cm. Reports heavy bleeding on ; using 1 pad/hr. Still having bright red bleeding today and wearing a pad along with \"menstral-like\" cramping. 2019 at [facility]:  CL 4.8 cm. [facility] measuring 6.2 x 7.3 x 4.2 cm; stable.   Reports two days ago had heavy bleeding (saturated 4 pads over night) and passed    Supervision of high risk pregnancy in second trimester 9/3/2013    SVT (supraventricular tachycardia) 2013    per pt-- only during pregnancy-- no meds    Symptomatic varicose veins of left lower extremity 3/28/2017    Threatened  2019    Thromboembolus ([address]) 2015    s/p infiltrated IV in arm     Social History     Tobacco Use    Smoking status: Never    Smokeless tobacco: Never   Substance Use Topics    Alcohol use: No     Past Surgical History:   Procedure Laterality Date     SECTION  2019    CHOLECYSTECTOMY, LAPAROSCOPIC      COLONOSCOPY N/A 2021    COLONOSCOPY/ BMI 34 performed by Lili Henry MD at [address] [place] [address]  2009     and  was revision

## 2023-12-04 ENCOUNTER — OFFICE VISIT (OUTPATIENT)
Dept: PULMONOLOGY | Age: 38
End: 2023-12-04
Payer: MEDICAID

## 2023-12-04 VITALS
BODY MASS INDEX: 30.21 KG/M2 | OXYGEN SATURATION: 99 % | DIASTOLIC BLOOD PRESSURE: 75 MMHG | RESPIRATION RATE: 20 BRPM | HEIGHT: 61 IN | TEMPERATURE: 98.1 F | WEIGHT: 160 LBS | SYSTOLIC BLOOD PRESSURE: 115 MMHG | HEART RATE: 84 BPM

## 2023-12-04 DIAGNOSIS — Z79.899 MEDICATION MANAGEMENT: ICD-10-CM

## 2023-12-04 DIAGNOSIS — J30.9 ALLERGIC RHINITIS, UNSPECIFIED SEASONALITY, UNSPECIFIED TRIGGER: ICD-10-CM

## 2023-12-04 DIAGNOSIS — J45.50 SEVERE PERSISTENT ASTHMA WITHOUT COMPLICATION: Primary | ICD-10-CM

## 2023-12-04 DIAGNOSIS — J32.9 CHRONIC RHINOSINUSITIS: ICD-10-CM

## 2023-12-04 DIAGNOSIS — J33.9 NASAL POLYPOSIS: ICD-10-CM

## 2023-12-04 LAB
EXPIRATORY TIME: NORMAL
FEF 25-75% %PRED-PRE: NORMAL
FEF 25-75% PRED: NORMAL
FEF 25-75%-PRE: NORMAL
FEV1 %PRED-PRE: 95 %
FEV1 PRED: NORMAL
FEV1/FVC %PRED-PRE: NORMAL
FEV1/FVC PRED: NORMAL
FEV1/FVC: 74 %
FEV1: 2.69 L
FVC %PRED-PRE: 108 %
FVC PRED: NORMAL
FVC: 3.64 L
PEF %PRED-PRE: NORMAL
PEF PRED: NORMAL
PEF-PRE: NORMAL

## 2023-12-04 PROCEDURE — 99214 OFFICE O/P EST MOD 30 MIN: CPT | Performed by: NURSE PRACTITIONER

## 2023-12-04 PROCEDURE — 94010 BREATHING CAPACITY TEST: CPT | Performed by: INTERNAL MEDICINE

## 2023-12-04 RX ORDER — FLUTICASONE PROPIONATE AND SALMETEROL 250; 50 UG/1; UG/1
POWDER RESPIRATORY (INHALATION)
Qty: 1 EACH | Refills: 11 | Status: SHIPPED | OUTPATIENT
Start: 2023-12-04

## 2023-12-04 RX ORDER — ALBUTEROL SULFATE 2.5 MG/3ML
2.5 SOLUTION RESPIRATORY (INHALATION) EVERY 6 HOURS PRN
Qty: 360 ML | Refills: 11 | Status: SHIPPED | OUTPATIENT
Start: 2023-12-04

## 2023-12-04 RX ORDER — ALBUTEROL SULFATE 90 UG/1
2 AEROSOL, METERED RESPIRATORY (INHALATION) EVERY 4 HOURS PRN
Qty: 1 EACH | Refills: 11 | Status: SHIPPED | OUTPATIENT
Start: 2023-12-04

## 2023-12-04 ASSESSMENT — ENCOUNTER SYMPTOMS
HEMOPTYSIS: 0
SHORTNESS OF BREATH: 1
COUGH: 0
SPUTUM PRODUCTION: 0

## 2023-12-04 ASSESSMENT — PULMONARY FUNCTION TESTS
FEV1: 2.69
FVC: 3.64
FVC_PERCENT_PREDICTED_PRE: 108
FEV1_PERCENT_PREDICTED_PRE: 95
FEV1/FVC: 74

## 2023-12-04 NOTE — PATIENT INSTRUCTIONS
Continue advair 250/50, 1 inhalation twice daily. Albuterol inhaler or nebulizer 4 times daily as needed. OTC Zyrtec or Claritin daily for allergy symptoms. She is initiating process for Dupixent approval by Dr. Lucrecia Christian for nasal polyps and this should also help significantly with asthma. Prescription for Prevnar 20 pneumonia vaccine is sent to her local pharmacy. Recommend newest COVID booster. She is up-to-date on flu vaccine.

## 2023-12-04 NOTE — PROGRESS NOTES
She is a 30-year-old female with severe persistent asthma, previously on Xolair therapy, has had issues with insurance reimbursement due to lack of adherence to maintenance therapy while on Xolair, seen as a work in visit today secondary to wheezing, shortness of breath, cough productive of purulent sputum. She has been using albuterol inhaler every 6 hours. She has missed last 4 scheduled appointments. Received message from Dr. Tamara Fairbanks regarding tx w/  [facility] for chronic, recurrent sinusitis, nasal polyps and asthma standpoint. Agree that this is a good option for her and likely will not face the issues that she has had with Xolair, primarily from compliance standpoint. She has also had issues obtaining IgE, absolute eosinophil count due to repeated courses of steroids. DIAGNOSTICS:     Spirometry 9/7/2016-moderately severe obstructive defect. Sinus CT 7/2017 - FINDINGS: There is extensive bilateral paranasal sinus disease. The maxillary and ethmoid sinuses are almost completely opacified bilaterally. There is also opacification of the left sphenoid sinus. The left frontal sinus and right sphenoid sinus are partially opacified. The patient has probably had bilateral sinus surgery. The maxillary ostia are widened. There are no fractures. There is no significant septal deviation. IMPRESSION: Extensive bilateral paranasal sinus disease. Spirometry 9/20/2019- normal.  IgE level of 255 and + allergen panel  CXR 2/18/2020 ER-lungs are clear, no acute process. CXR 12/21/2020-lungs are clear. No acute process. CXR 8/29/2021-ER-clear lungs, no acute process. CT of sinuses 11/2023-pansinusitis.
puffs into the lungs every 4 hours as needed for Wheezing or Shortness of Breath    albuterol (PROVENTIL) (2.5 MG/3ML) 0.083% nebulizer solution Take 3 mLs by nebulization every 6 hours as needed for Wheezing or Shortness of Breath    zolpidem (AMBIEN) 10 MG tablet Take 1 tablet by mouth nightly. at bedtime    methylphenidate 36 MG CR tablet Take 1 tablet by mouth every morning. omeprazole (PRILOSEC) 40 MG delayed release capsule Take 1 capsule by mouth 2 times daily    sertraline (ZOLOFT) 100 MG tablet Take 1 tablet by mouth daily    sucralfate (CARAFATE) 1 GM tablet Take 1 tablet by mouth 4 times daily    nystatin (MYCOSTATIN) 204342 UNIT/GM ointment APPLY OINTMENT TOPICALLY TWICE DAILY FOR 7 DAYS (Patient not taking: Reported on 12/4/2023)    LORazepam (ATIVAN) 1 MG tablet Take by mouth every 4 hours as needed. (Patient not taking: Reported on 12/4/2023)    omalizumab (XOLAIR) 150 MG injection Inject into the skin every 14 days     No current facility-administered medications for this visit. Over 50% of today's office visit was spent in face to face time reviewing test results, prognosis, importance of compliance, education about disease process, benefits of medications, instructions for management of acute symptoms, and follow up plans. Electronically signed. Dictated using voice recognition software.   Proof read but unrecognized errors may exist.

## 2023-12-25 ENCOUNTER — HOSPITAL ENCOUNTER (EMERGENCY)
Age: 38
Discharge: HOME | End: 2023-12-25
Attending: EMERGENCY MEDICINE
Payer: MEDICAID

## 2023-12-25 ENCOUNTER — APPOINTMENT (OUTPATIENT)
Dept: GENERAL RADIOLOGY | Age: 38
End: 2023-12-25
Payer: MEDICAID

## 2023-12-25 VITALS
DIASTOLIC BLOOD PRESSURE: 75 MMHG | SYSTOLIC BLOOD PRESSURE: 114 MMHG | OXYGEN SATURATION: 97 % | HEART RATE: 123 BPM | RESPIRATION RATE: 16 BRPM | TEMPERATURE: 98.1 F

## 2023-12-25 DIAGNOSIS — J45.901 SEVERE ASTHMA WITH ACUTE EXACERBATION, UNSPECIFIED WHETHER PERSISTENT: Primary | ICD-10-CM

## 2023-12-25 DIAGNOSIS — J32.9 CHRONIC SINUSITIS, UNSPECIFIED LOCATION: ICD-10-CM

## 2023-12-25 LAB
ANION GAP SERPL CALC-SCNC: 8 MMOL/L (ref 2–11)
BASOPHILS # BLD: 0.1 K/UL (ref 0–0.2)
BASOPHILS NFR BLD: 1 % (ref 0–2)
BUN SERPL-MCNC: 19 MG/DL (ref 6–23)
CALCIUM SERPL-MCNC: 9.2 MG/DL (ref 8.3–10.4)
CHLORIDE SERPL-SCNC: 112 MMOL/L (ref 103–113)
CO2 SERPL-SCNC: 22 MMOL/L (ref 21–32)
CREAT SERPL-MCNC: 1.3 MG/DL (ref 0.6–1)
DIFFERENTIAL METHOD BLD: ABNORMAL
EOSINOPHIL # BLD: 0.6 K/UL (ref 0–0.8)
EOSINOPHIL NFR BLD: 8 % (ref 0.5–7.8)
ERYTHROCYTE [DISTWIDTH] IN BLOOD BY AUTOMATED COUNT: 12.8 % (ref 11.9–14.6)
FLUAV RNA SPEC QL NAA+PROBE: NOT DETECTED
FLUBV RNA SPEC QL NAA+PROBE: NOT DETECTED
GLUCOSE SERPL-MCNC: 94 MG/DL (ref 65–100)
HCT VFR BLD AUTO: 37.2 % (ref 35.8–46.3)
HGB BLD-MCNC: 12.1 G/DL (ref 11.7–15.4)
IMM GRANULOCYTES # BLD AUTO: 0 K/UL (ref 0–0.5)
IMM GRANULOCYTES NFR BLD AUTO: 0 % (ref 0–5)
LYMPHOCYTES # BLD: 2 K/UL (ref 0.5–4.6)
LYMPHOCYTES NFR BLD: 24 % (ref 13–44)
MCH RBC QN AUTO: 29 PG (ref 26.1–32.9)
MCHC RBC AUTO-ENTMCNC: 32.5 G/DL (ref 31.4–35)
MCV RBC AUTO: 89.2 FL (ref 82–102)
MONOCYTES # BLD: 0.4 K/UL (ref 0.1–1.3)
MONOCYTES NFR BLD: 5 % (ref 4–12)
NEUTS SEG # BLD: 5 K/UL (ref 1.7–8.2)
NEUTS SEG NFR BLD: 62 % (ref 43–78)
NRBC # BLD: 0 K/UL (ref 0–0.2)
PLATELET # BLD AUTO: 266 K/UL (ref 150–450)
PMV BLD AUTO: 9.8 FL (ref 9.4–12.3)
POTASSIUM SERPL-SCNC: 3.3 MMOL/L (ref 3.5–5.1)
RBC # BLD AUTO: 4.17 M/UL (ref 4.05–5.2)
SODIUM SERPL-SCNC: 142 MMOL/L (ref 136–146)
WBC # BLD AUTO: 8.1 K/UL (ref 4.3–11.1)

## 2023-12-25 PROCEDURE — 2580000003 HC RX 258: Performed by: EMERGENCY MEDICINE

## 2023-12-25 PROCEDURE — 6360000002 HC RX W HCPCS: Performed by: EMERGENCY MEDICINE

## 2023-12-25 PROCEDURE — 94644 CONT INHLJ TX 1ST HOUR: CPT

## 2023-12-25 PROCEDURE — 87502 INFLUENZA DNA AMP PROBE: CPT

## 2023-12-25 PROCEDURE — 6370000000 HC RX 637 (ALT 250 FOR IP): Performed by: EMERGENCY MEDICINE

## 2023-12-25 PROCEDURE — 71045 X-RAY EXAM CHEST 1 VIEW: CPT

## 2023-12-25 PROCEDURE — 99284 EMERGENCY DEPT VISIT MOD MDM: CPT

## 2023-12-25 PROCEDURE — 96365 THER/PROPH/DIAG IV INF INIT: CPT

## 2023-12-25 PROCEDURE — 96375 TX/PRO/DX INJ NEW DRUG ADDON: CPT

## 2023-12-25 PROCEDURE — 96366 THER/PROPH/DIAG IV INF ADDON: CPT

## 2023-12-25 PROCEDURE — 85025 COMPLETE CBC W/AUTO DIFF WBC: CPT

## 2023-12-25 PROCEDURE — 80048 BASIC METABOLIC PNL TOTAL CA: CPT

## 2023-12-25 PROCEDURE — 94640 AIRWAY INHALATION TREATMENT: CPT

## 2023-12-25 RX ORDER — AMOXICILLIN AND CLAVULANATE POTASSIUM 875; 125 MG/1; MG/1
1 TABLET, FILM COATED ORAL 2 TIMES DAILY
Qty: 28 TABLET | Refills: 0 | Status: SHIPPED | OUTPATIENT
Start: 2023-12-25 | End: 2024-01-08

## 2023-12-25 RX ORDER — IPRATROPIUM BROMIDE AND ALBUTEROL SULFATE 2.5; .5 MG/3ML; MG/3ML
1 SOLUTION RESPIRATORY (INHALATION)
Status: COMPLETED | OUTPATIENT
Start: 2023-12-25 | End: 2023-12-25

## 2023-12-25 RX ORDER — ALBUTEROL SULFATE 2.5 MG/3ML
10 SOLUTION RESPIRATORY (INHALATION) ONCE
OUTPATIENT
Start: 2023-12-25

## 2023-12-25 RX ORDER — PREDNISONE 20 MG/1
TABLET ORAL
Qty: 24 TABLET | Refills: 0 | Status: SHIPPED | OUTPATIENT
Start: 2023-12-25 | End: 2024-01-05

## 2023-12-25 RX ORDER — AMOXICILLIN AND CLAVULANATE POTASSIUM 875; 125 MG/1; MG/1
1 TABLET, FILM COATED ORAL
Status: COMPLETED | OUTPATIENT
Start: 2023-12-25 | End: 2023-12-25

## 2023-12-25 RX ORDER — MAGNESIUM SULFATE IN WATER 40 MG/ML
2000 INJECTION, SOLUTION INTRAVENOUS ONCE
Status: COMPLETED | OUTPATIENT
Start: 2023-12-25 | End: 2023-12-25

## 2023-12-25 RX ORDER — ALBUTEROL SULFATE 2.5 MG/3ML
10 SOLUTION RESPIRATORY (INHALATION)
Status: COMPLETED | OUTPATIENT
Start: 2023-12-25 | End: 2023-12-25

## 2023-12-25 RX ORDER — ALBUTEROL SULFATE 2.5 MG/3ML
5 SOLUTION RESPIRATORY (INHALATION)
Status: COMPLETED | OUTPATIENT
Start: 2023-12-25 | End: 2023-12-25

## 2023-12-25 RX ADMIN — ALBUTEROL SULFATE 5 MG: 2.5 SOLUTION RESPIRATORY (INHALATION) at 17:20

## 2023-12-25 RX ADMIN — ALBUTEROL SULFATE 10 MG: 2.5 SOLUTION RESPIRATORY (INHALATION) at 15:22

## 2023-12-25 RX ADMIN — WATER 125 MG: 1 INJECTION INTRAMUSCULAR; INTRAVENOUS; SUBCUTANEOUS at 15:30

## 2023-12-25 RX ADMIN — MAGNESIUM SULFATE HEPTAHYDRATE 2000 MG: 40 INJECTION, SOLUTION INTRAVENOUS at 15:30

## 2023-12-25 RX ADMIN — IPRATROPIUM BROMIDE AND ALBUTEROL SULFATE 1 DOSE: 2.5; .5 SOLUTION RESPIRATORY (INHALATION) at 15:07

## 2023-12-25 RX ADMIN — AMOXICILLIN AND CLAVULANATE POTASSIUM 1 TABLET: 875; 125 TABLET, FILM COATED ORAL at 16:49

## 2023-12-25 NOTE — ED PROVIDER NOTES
Normal appearance. HENT:      Nose: Congestion present. Mouth/Throat:      Pharynx: Oropharynx is clear. Eyes:      Pupils: Pupils are equal, round, and reactive to light. Cardiovascular:      Rate and Rhythm: Regular rhythm. Tachycardia present. Pulses: Normal pulses. Heart sounds: Normal heart sounds. Pulmonary:      Comments: Tachypnea. Musculoskeletal:         General: No swelling. Skin:     General: Skin is warm and dry. Neurological:      General: No focal deficit present. Mental Status: She is alert. Procedures    Results for orders placed or performed during the hospital encounter of 12/25/23   Influenza A/B, Molecular    Specimen: Nasopharyngeal   Result Value Ref Range    Influenza A, AGATHA Not detected NOTD      Influenza B, AGATHA Not detected NOTD     XR CHEST 1 VIEW    Narrative    Chest X-ray    INDICATION: Shortness of breath and cough. COMPARISON: August 29, 2021. AP/PA view of the chest was obtained. FINDINGS: The lungs are unchanged compared to prior study with prominent central  pulm vessels. . There are no infiltrates or effusions. The heart size is normal.   The bony thorax is intact. The mediastinum and hemidiaphragms are stable. Impression    No significant changes compared to prior study with prominent  central pulmonary vessels. No evidence of pneumonia or pneumothorax or pleural  effusions.      CBC with Auto Differential   Result Value Ref Range    WBC 8.1 4.3 - 11.1 K/uL    RBC 4.17 4.05 - 5.2 M/uL    Hemoglobin 12.1 11.7 - 15.4 g/dL    Hematocrit 37.2 35.8 - 46.3 %    MCV 89.2 82.0 - 102.0 FL    MCH 29.0 26.1 - 32.9 PG    MCHC 32.5 31.4 - 35.0 g/dL    RDW 12.8 11.9 - 14.6 %    Platelets 569 389 - 109 K/uL    MPV 9.8 9.4 - 12.3 FL    nRBC 0.00 0.0 - 0.2 K/uL    Differential Type AUTOMATED      Neutrophils % 62 43 - 78 %    Lymphocytes % 24 13 - 44 %    Monocytes % 5 4.0 - 12.0 %    Eosinophils % 8 (H) 0.5 - 7.8 %    Basophils % 1 0.0 -

## 2023-12-27 ENCOUNTER — TELEMEDICINE (OUTPATIENT)
Dept: PULMONOLOGY | Age: 38
End: 2023-12-27
Payer: MEDICAID

## 2023-12-27 ENCOUNTER — TELEPHONE (OUTPATIENT)
Dept: PULMONOLOGY | Age: 38
End: 2023-12-27

## 2023-12-27 DIAGNOSIS — J45.51 SEVERE PERSISTENT ASTHMA WITH EXACERBATION: Primary | ICD-10-CM

## 2023-12-27 DIAGNOSIS — J20.9 ACUTE BRONCHITIS, UNSPECIFIED ORGANISM: ICD-10-CM

## 2023-12-27 DIAGNOSIS — J32.9 CHRONIC RHINOSINUSITIS: ICD-10-CM

## 2023-12-27 PROCEDURE — 99213 OFFICE O/P EST LOW 20 MIN: CPT | Performed by: NURSE PRACTITIONER

## 2023-12-27 NOTE — TELEPHONE ENCOUNTER
Last seen: 12/4/23  Hx: severe asthma, allergic rhinitis, nasal polyposis, chronic rhino sinusitis    ER visit 12/25/23 for asthma exacerbation & sinusitis; was taking doxy for sinuses & no improvement, d/c w/ augmentin w/ steroids. Negative flu & COVID, continuous neb txs in ER. Patient call reporting asthma flare up, cough productive only after neb tx's, ongoing wheezing. Contacted patient, she reports she started steroids day 2 of 60mg & even after nebulizer tx's still wheezing. Worked in for [employer] appt w/ NP today.

## 2023-12-27 NOTE — PROGRESS NOTES
[facility]    [address], [address] [facility], [address] [place] [address]  T: [phone]      Visit Type: Carla     The patient is seen by synchronous (real-time) audio-video technology on [facility] [place] [facility]. I was at [facility]. while conducting this visit. I am appropriately licensed, to deliver care in the state where the patient is located as indicated above. Vibha Booker, was evaluated through a synchronous (real-time) audio-video encounter. The patient (or guardian if applicable) is aware that this is a billable service, which includes applicable co-pays. This Virtual Visit was conducted with patient's (and/or legal guardian's) consent. Patient identification was verified, and a caregiver was present when appropriate. The patient was located at [address] [place] [address], [id number]  Provider was located at [address] [facility] Yeyo Rothman. [address]        CHIEF COMPLAINT:    Chief Complaint   Patient presents with    Asthma    Cough    Wheezing     ER follow up       HISTORY OF PRESENT ILLNESS:    She is a 59-year-old female with severe persistent asthma, previously on Xolair therapy, has had issues with insurance reimbursement due to lack of adherence to maintenance therapy while on Xolair, seen as a work in visit today secondary to wheezing, shortness of breath, cough productive of purulent sputum. She has been using albuterol inhaler every 6 hours. Received message from Dr. Dari Field regarding [facility]/  [facility] for chronic, recurrent sinusitis, nasal polyps and asthma standpoint. Agree that this is a good option for her and likely will not face the issues that she has had with Xolair, primarily from compliance standpoint. She has also had issues obtaining IgE, absolute eosinophil count due to repeated courses of steroids.   She states that she just received letter from insurance that

## 2023-12-27 NOTE — PATIENT INSTRUCTIONS
Advised to resume Advair today, twice daily, rinse mouth after use. Use albuterol via nebulizer every 4 hours until back to baseline, then as needed. OTC mucolytic, (mucinex or generic equivalent of guaifenesin), 2400mg in 24 hours in divided doses as needed to thin mucous. Complete prednisone taper as prescribed in the ER. Advised that she should see gradual improvement. However, if she develops respiratory distress, advised to return to the ER as she has likely failed outpatient management.

## 2023-12-27 NOTE — TELEPHONE ENCOUNTER
TRIAGE CALL      Complaint: asthma flare  Cough: yes  Productive:  only after nebulizer treatment  Bloody Sputum:  no  Increased SOB/Wheezing:  wheezing  Duration: Shaka Denice   Fever/Chills: no  OTC Meds tried: no     Tested Negative for covid and flu

## 2024-01-16 NOTE — PROGRESS NOTES
The patient is a 38 y.o.  who is seen for vaginal discharge and odor.   Pt c/o thick discharge, white color, odor. Vaginal irritation. Denies trying anything OTC.   No itching. + new partner.   No urinary sx or pelvic pain.    She reports weight loss of approx 25lb since last May without any changes or effort. Rec PCP evaluation, will refer.     HISTORY:      Patient's last menstrual period was 2023 (approximate).  Sexual History:  single partner, contraception - status post hysterectomy  Contraception:  status post hysterectomy  Current Outpatient Medications on File Prior to Visit   Medication Sig Dispense Refill    albuterol (PROVENTIL) (2.5 MG/3ML) 0.083% nebulizer solution Take 3 mLs by nebulization every 6 hours as needed for Wheezing or Shortness of Breath 360 mL 11    albuterol sulfate HFA (PROVENTIL;VENTOLIN;PROAIR) 108 (90 Base) MCG/ACT inhaler Inhale 2 puffs into the lungs every 4 hours as needed for Wheezing or Shortness of Breath 1 each 11    fluticasone-salmeterol (ADVAIR DISKUS) 250-50 MCG/ACT AEPB diskus inhaler 1 inhalation twice daily, rinse mouth after use 1 each 11    EPINEPHrine (EPIPEN) 0.3 MG/0.3ML SOAJ injection       nystatin (MYCOSTATIN) 632408 UNIT/GM ointment       clonazePAM (KLONOPIN) 1 MG tablet       Methylphenidate HCl ER, OSM, 72 MG TBCR Take 1 tablet by mouth every morning Max Daily Amount: 1 tablet      ondansetron (ZOFRAN-ODT) 8 MG TBDP disintegrating tablet DISSOLVE 1 TABLET IN MOUTH EVERY 8 HOURS AS NEEDED FOR NAUSEA      zolpidem (AMBIEN) 10 MG tablet Take 1 tablet by mouth nightly. at bedtime      methylphenidate 36 MG CR tablet Take 1 tablet by mouth every morning.      omalizumab (XOLAIR) 150 MG injection Inject into the skin every 14 days      omeprazole (PRILOSEC) 40 MG delayed release capsule Take 1 capsule by mouth 2 times daily      sertraline (ZOLOFT) 100 MG tablet Take 1 tablet by mouth daily      sucralfate (CARAFATE) 1 GM tablet Take 1 tablet by

## 2024-01-17 ENCOUNTER — OFFICE VISIT (OUTPATIENT)
Dept: OBGYN CLINIC | Age: 39
End: 2024-01-17
Payer: MEDICAID

## 2024-01-17 VITALS
SYSTOLIC BLOOD PRESSURE: 116 MMHG | WEIGHT: 147.1 LBS | DIASTOLIC BLOOD PRESSURE: 64 MMHG | HEIGHT: 61 IN | BODY MASS INDEX: 27.77 KG/M2

## 2024-01-17 DIAGNOSIS — N89.8 VAGINAL IRRITATION: ICD-10-CM

## 2024-01-17 DIAGNOSIS — Z75.8 DOES NOT HAVE PRIMARY CARE PROVIDER: ICD-10-CM

## 2024-01-17 DIAGNOSIS — N89.8 VAGINAL ODOR: ICD-10-CM

## 2024-01-17 DIAGNOSIS — N89.8 VAGINAL DISCHARGE: ICD-10-CM

## 2024-01-17 DIAGNOSIS — R63.4 WEIGHT LOSS: Primary | ICD-10-CM

## 2024-01-17 PROCEDURE — 99213 OFFICE O/P EST LOW 20 MIN: CPT | Performed by: NURSE PRACTITIONER

## 2024-01-19 LAB
A VAGINAE DNA VAG QL NAA+PROBE: ABNORMAL SCORE
BVAB2 DNA VAG QL NAA+PROBE: ABNORMAL SCORE
C ALBICANS DNA VAG QL NAA+PROBE: NEGATIVE
C GLABRATA DNA VAG QL NAA+PROBE: NEGATIVE
C TRACH RRNA SPEC QL NAA+PROBE: NEGATIVE
MEGA1 DNA VAG QL NAA+PROBE: ABNORMAL SCORE
N GONORRHOEA RRNA SPEC QL NAA+PROBE: NEGATIVE
SPECIMEN SOURCE: ABNORMAL
T VAGINALIS RRNA SPEC QL NAA+PROBE: NEGATIVE

## 2024-01-22 DIAGNOSIS — N89.8 VAGINAL ODOR: ICD-10-CM

## 2024-01-22 DIAGNOSIS — N89.8 VAGINAL DISCHARGE: Primary | ICD-10-CM

## 2024-01-22 DIAGNOSIS — N89.8 VAGINAL IRRITATION: ICD-10-CM

## 2024-01-22 RX ORDER — METRONIDAZOLE 500 MG/1
TABLET ORAL
Qty: 14 TABLET | Refills: 0 | Status: SHIPPED | OUTPATIENT
Start: 2024-01-22

## 2024-02-26 SDOH — HEALTH STABILITY: PHYSICAL HEALTH: ON AVERAGE, HOW MANY DAYS PER WEEK DO YOU ENGAGE IN MODERATE TO STRENUOUS EXERCISE (LIKE A BRISK WALK)?: 7 DAYS

## 2024-02-26 SDOH — HEALTH STABILITY: PHYSICAL HEALTH: ON AVERAGE, HOW MANY MINUTES DO YOU ENGAGE IN EXERCISE AT THIS LEVEL?: 30 MIN

## 2024-02-29 ENCOUNTER — OFFICE VISIT (OUTPATIENT)
Dept: FAMILY MEDICINE CLINIC | Facility: CLINIC | Age: 39
End: 2024-02-29
Payer: MEDICAID

## 2024-02-29 VITALS
HEART RATE: 77 BPM | OXYGEN SATURATION: 100 % | SYSTOLIC BLOOD PRESSURE: 110 MMHG | DIASTOLIC BLOOD PRESSURE: 58 MMHG | WEIGHT: 146.7 LBS | BODY MASS INDEX: 27.7 KG/M2 | RESPIRATION RATE: 16 BRPM | HEIGHT: 61 IN | TEMPERATURE: 97.8 F

## 2024-02-29 DIAGNOSIS — L65.9 ALOPECIA: ICD-10-CM

## 2024-02-29 DIAGNOSIS — B37.2 YEAST DERMATITIS: ICD-10-CM

## 2024-02-29 DIAGNOSIS — K21.9 GASTROESOPHAGEAL REFLUX DISEASE, UNSPECIFIED WHETHER ESOPHAGITIS PRESENT: Primary | ICD-10-CM

## 2024-02-29 DIAGNOSIS — R11.0 NAUSEA: ICD-10-CM

## 2024-02-29 DIAGNOSIS — Z98.84 HISTORY OF GASTRIC BYPASS: ICD-10-CM

## 2024-02-29 PROBLEM — R87.612 LOW GRADE SQUAMOUS INTRAEPITH LESION ON CYTOLOGIC SMEAR CERVIX (LGSIL): Status: ACTIVE | Noted: 2019-02-28

## 2024-02-29 PROBLEM — K28.9 MARGINAL ULCER: Chronic | Status: ACTIVE | Noted: 2021-05-13

## 2024-02-29 PROBLEM — D50.0 IRON DEFICIENCY ANEMIA DUE TO CHRONIC BLOOD LOSS: Chronic | Status: ACTIVE | Noted: 2021-05-13

## 2024-02-29 PROCEDURE — 99204 OFFICE O/P NEW MOD 45 MIN: CPT | Performed by: NURSE PRACTITIONER

## 2024-02-29 RX ORDER — METHYLPHENIDATE HYDROCHLORIDE 20 MG/1
TABLET ORAL
COMMUNITY
Start: 2024-02-27

## 2024-02-29 RX ORDER — ONDANSETRON 8 MG/1
TABLET, ORALLY DISINTEGRATING ORAL
Qty: 30 TABLET | Refills: 0 | Status: SHIPPED | OUTPATIENT
Start: 2024-02-29

## 2024-02-29 RX ORDER — NYSTATIN 100000 [USP'U]/G
POWDER TOPICAL
Qty: 15 G | Refills: 0 | Status: SHIPPED | OUTPATIENT
Start: 2024-02-29

## 2024-02-29 RX ORDER — OMEPRAZOLE 40 MG/1
40 CAPSULE, DELAYED RELEASE ORAL 2 TIMES DAILY
Qty: 60 CAPSULE | Refills: 0 | Status: SHIPPED | OUTPATIENT
Start: 2024-02-29 | End: 2024-03-30

## 2024-02-29 SDOH — ECONOMIC STABILITY: FOOD INSECURITY: WITHIN THE PAST 12 MONTHS, YOU WORRIED THAT YOUR FOOD WOULD RUN OUT BEFORE YOU GOT MONEY TO BUY MORE.: NEVER TRUE

## 2024-02-29 SDOH — ECONOMIC STABILITY: INCOME INSECURITY: HOW HARD IS IT FOR YOU TO PAY FOR THE VERY BASICS LIKE FOOD, HOUSING, MEDICAL CARE, AND HEATING?: NOT HARD AT ALL

## 2024-02-29 SDOH — ECONOMIC STABILITY: FOOD INSECURITY: WITHIN THE PAST 12 MONTHS, THE FOOD YOU BOUGHT JUST DIDN'T LAST AND YOU DIDN'T HAVE MONEY TO GET MORE.: NEVER TRUE

## 2024-02-29 SDOH — ECONOMIC STABILITY: HOUSING INSECURITY
IN THE LAST 12 MONTHS, WAS THERE A TIME WHEN YOU DID NOT HAVE A STEADY PLACE TO SLEEP OR SLEPT IN A SHELTER (INCLUDING NOW)?: NO

## 2024-02-29 ASSESSMENT — PATIENT HEALTH QUESTIONNAIRE - PHQ9
SUM OF ALL RESPONSES TO PHQ QUESTIONS 1-9: 0
2. FEELING DOWN, DEPRESSED OR HOPELESS: 0
SUM OF ALL RESPONSES TO PHQ QUESTIONS 1-9: 0
1. LITTLE INTEREST OR PLEASURE IN DOING THINGS: 0
SUM OF ALL RESPONSES TO PHQ9 QUESTIONS 1 & 2: 0

## 2024-02-29 NOTE — PROGRESS NOTES
Tere Cook (: 1985) presents today to establish care and for bald patches in hair, yeast infection in belly button. Currently seeing psychiatry, ENT, and pulmonology.    She has history of gastric bypass, insomnia, anxiety/depression, asthma, allergies, gastric ulcer, GERD.    Sees psychiatry- on 100 mg Zoloft, klonopin 1 mg-takes maybe once a day. On concerta and ritalin for ADD. Was on suboxone but states she stopped taking this. Insomnia- takes ambien 10 mg prn.     ENT- has nasal polyps and sees ENT for this.     Gastric bypass (Dr. Bynum)/Gastric ulcer- has seen GI in the past- on Carafate 4 times a day- in the past have tried to stitch ulcer and it will not heel- hx of gastric bypass. Takes zofran as needed. On omeprazole 40 mg -takes 2 BID- only thing that gets her pain to go away- has been doing this for over a year. Has also lost a lot of weight recently unintentionally but mostly due to not eating r/t pain.     Asthma/allergies- sees [facility]. Was on xolair injections- she wants to go back on it. Using advair, resuce inhaler. Has epi pen.     Umbilical area yeast infection- has been using nystatin ointment- helps but then comes back. Has not seen dermatology for this. It does not itch but it is painful.      Bald patches in hair- in the past has been told she had alopecia. Gets them occasionally. Saw dermatology in the past for this and helped some.      Chief Complaint   Patient presents with    New Patient     Establish care and discuss bald patches in hair and yeast infection in her belly button      Patient Active Problem List   Diagnosis    Severe persistent asthma    History of blood clots    Allergic rhinitis    Obesity (BMI 30.0-34.9)    History of abnormal cervical Papanicolaou smear    Gastroesophageal reflux disease    Menorrhagia    Dysmenorrhea    Enlarged uterus    Nasal polyposis

## 2024-07-01 NOTE — TELEPHONE ENCOUNTER
Continue iv fluids/Pressors     Patient called in requesting appointment with either Dr. Lyndsey Alejandro or either NP. Called back to schedule appointment. Phone rang twice, answered, but no one said anything. Unable to leave message with patient.      THIS IS AN FYI ONLY Patient's HM shows they are overdue for Mammogram.   [employer] and  files searched  without success.

## 2024-07-08 ENCOUNTER — OFFICE VISIT (OUTPATIENT)
Dept: FAMILY MEDICINE CLINIC | Facility: CLINIC | Age: 39
End: 2024-07-08
Payer: MEDICAID

## 2024-07-08 VITALS
SYSTOLIC BLOOD PRESSURE: 100 MMHG | OXYGEN SATURATION: 98 % | TEMPERATURE: 97.7 F | DIASTOLIC BLOOD PRESSURE: 70 MMHG | RESPIRATION RATE: 17 BRPM | HEIGHT: 61 IN | WEIGHT: 136.9 LBS | BODY MASS INDEX: 25.85 KG/M2 | HEART RATE: 103 BPM

## 2024-07-08 DIAGNOSIS — F90.9 ATTENTION DEFICIT HYPERACTIVITY DISORDER (ADHD), UNSPECIFIED ADHD TYPE: ICD-10-CM

## 2024-07-08 DIAGNOSIS — F32.A ANXIETY AND DEPRESSION: Primary | ICD-10-CM

## 2024-07-08 DIAGNOSIS — Z98.84 HISTORY OF GASTRIC BYPASS: ICD-10-CM

## 2024-07-08 DIAGNOSIS — K28.9 MARGINAL ULCER: ICD-10-CM

## 2024-07-08 DIAGNOSIS — K21.9 GASTROESOPHAGEAL REFLUX DISEASE, UNSPECIFIED WHETHER ESOPHAGITIS PRESENT: ICD-10-CM

## 2024-07-08 DIAGNOSIS — R63.4 WEIGHT LOSS: ICD-10-CM

## 2024-07-08 DIAGNOSIS — R11.0 NAUSEA: ICD-10-CM

## 2024-07-08 DIAGNOSIS — F41.9 ANXIETY AND DEPRESSION: Primary | ICD-10-CM

## 2024-07-08 DIAGNOSIS — Z87.11 HISTORY OF GASTRIC ULCER: ICD-10-CM

## 2024-07-08 LAB
ALBUMIN SERPL-MCNC: 3.5 G/DL (ref 3.5–5)
ALBUMIN/GLOB SERPL: 1.4 (ref 1–1.9)
ALP SERPL-CCNC: 45 U/L (ref 35–104)
ALT SERPL-CCNC: 16 U/L (ref 12–65)
ANION GAP SERPL CALC-SCNC: 9 MMOL/L (ref 9–18)
AST SERPL-CCNC: 24 U/L (ref 15–37)
BASOPHILS # BLD: 0.1 K/UL (ref 0–0.2)
BASOPHILS NFR BLD: 1 % (ref 0–2)
BILIRUB SERPL-MCNC: 0.3 MG/DL (ref 0–1.2)
BUN SERPL-MCNC: 11 MG/DL (ref 6–23)
CALCIUM SERPL-MCNC: 9.1 MG/DL (ref 8.8–10.2)
CHLORIDE SERPL-SCNC: 108 MMOL/L (ref 98–107)
CO2 SERPL-SCNC: 26 MMOL/L (ref 20–28)
CREAT SERPL-MCNC: 0.79 MG/DL (ref 0.6–1.1)
DIFFERENTIAL METHOD BLD: ABNORMAL
EOSINOPHIL # BLD: 0.7 K/UL (ref 0–0.8)
EOSINOPHIL NFR BLD: 12 % (ref 0.5–7.8)
ERYTHROCYTE [DISTWIDTH] IN BLOOD BY AUTOMATED COUNT: 13.2 % (ref 11.9–14.6)
GLOBULIN SER CALC-MCNC: 2.5 G/DL (ref 2.3–3.5)
GLUCOSE SERPL-MCNC: 89 MG/DL (ref 70–99)
HCT VFR BLD AUTO: 41.5 % (ref 35.8–46.3)
HGB BLD-MCNC: 13.1 G/DL (ref 11.7–15.4)
IMM GRANULOCYTES # BLD AUTO: 0 K/UL (ref 0–0.5)
IMM GRANULOCYTES NFR BLD AUTO: 0 % (ref 0–5)
LYMPHOCYTES # BLD: 2.1 K/UL (ref 0.5–4.6)
LYMPHOCYTES NFR BLD: 34 % (ref 13–44)
MCH RBC QN AUTO: 29.4 PG (ref 26.1–32.9)
MCHC RBC AUTO-ENTMCNC: 31.6 G/DL (ref 31.4–35)
MCV RBC AUTO: 93.3 FL (ref 82–102)
MONOCYTES # BLD: 0.3 K/UL (ref 0.1–1.3)
MONOCYTES NFR BLD: 5 % (ref 4–12)
NEUTS SEG # BLD: 3 K/UL (ref 1.7–8.2)
NEUTS SEG NFR BLD: 48 % (ref 43–78)
NRBC # BLD: 0 K/UL (ref 0–0.2)
PLATELET # BLD AUTO: 241 K/UL (ref 150–450)
PMV BLD AUTO: 10.2 FL (ref 9.4–12.3)
POTASSIUM SERPL-SCNC: 3.8 MMOL/L (ref 3.5–5.1)
PROT SERPL-MCNC: 5.9 G/DL (ref 6.3–8.2)
RBC # BLD AUTO: 4.45 M/UL (ref 4.05–5.2)
SODIUM SERPL-SCNC: 142 MMOL/L (ref 136–145)
TSH, 3RD GENERATION: 2.71 UIU/ML (ref 0.27–4.2)
WBC # BLD AUTO: 6.3 K/UL (ref 4.3–11.1)

## 2024-07-08 PROCEDURE — 99214 OFFICE O/P EST MOD 30 MIN: CPT | Performed by: NURSE PRACTITIONER

## 2024-07-08 ASSESSMENT — ENCOUNTER SYMPTOMS
SHORTNESS OF BREATH: 0
NAUSEA: 0
VOMITING: 0
ABDOMINAL PAIN: 1
DIARRHEA: 0
BACK PAIN: 0
SINUS PAIN: 0
EYE PAIN: 0
SORE THROAT: 0
EYE REDNESS: 0
BLOOD IN STOOL: 0
CONSTIPATION: 0
COUGH: 0

## 2024-07-08 ASSESSMENT — PATIENT HEALTH QUESTIONNAIRE - PHQ9
2. FEELING DOWN, DEPRESSED OR HOPELESS: SEVERAL DAYS
SUM OF ALL RESPONSES TO PHQ QUESTIONS 1-9: 2
SUM OF ALL RESPONSES TO PHQ9 QUESTIONS 1 & 2: 2
1. LITTLE INTEREST OR PLEASURE IN DOING THINGS: SEVERAL DAYS

## 2024-07-08 NOTE — PROGRESS NOTES
not been amenable to medication therapy.  On Prilosec BID and Carafate as needed.  Epigastric pain is worsening.  Also with N/V.  Uses Zofran PRN.  Last seen by gastro 3/2023.  She has had multiple EGD's.  Last EGD 3/2023.  \"Close the ulcer bed at the GJ anastomosis, one   hemostatic clip was successfully placed (MR janice)\".  No-showed gastro appointment 3/2024.  Hx of chronic anxiety, depression and ADHD.  Worsening.  Followed by psychiatry but wants a second opinion.  States she has been on Zoloft for years and feels as though it isn't working.      Review of Systems   Constitutional:  Positive for unexpected weight change and weight loss. Negative for chills and fever.   HENT:  Negative for congestion, ear pain, sinus pain and sore throat.    Eyes:  Negative for pain and redness.   Respiratory:  Negative for cough and shortness of breath.    Cardiovascular:  Negative for chest pain and palpitations.   Gastrointestinal:  Positive for abdominal pain. Negative for blood in stool, constipation, diarrhea, nausea and vomiting.   Endocrine: Negative for polydipsia.   Genitourinary:  Negative for dysuria, frequency and urgency.   Musculoskeletal:  Negative for arthralgias, back pain and myalgias.   Skin:  Negative for rash.   Allergic/Immunologic: Negative for environmental allergies.   Neurological:  Negative for dizziness and headaches.   Psychiatric/Behavioral:  Positive for decreased concentration and dysphoric mood. Negative for hallucinations and suicidal ideas. The patient is nervous/anxious.           Objective   Physical Exam  Vitals and nursing note reviewed.   Constitutional:       General: She is not in acute distress.     Appearance: Normal appearance.   HENT:      Head: Normocephalic.      Right Ear: External ear normal.      Left Ear: External ear normal.      Nose: Nose normal.      Mouth/Throat:      Lips: Pink.      Mouth: Mucous membranes are moist.   Eyes:      Conjunctiva/sclera: Conjunctivae

## 2024-08-09 ENCOUNTER — OFFICE VISIT (OUTPATIENT)
Dept: OBGYN CLINIC | Age: 39
End: 2024-08-09
Payer: MEDICAID

## 2024-08-09 VITALS
SYSTOLIC BLOOD PRESSURE: 118 MMHG | DIASTOLIC BLOOD PRESSURE: 64 MMHG | HEIGHT: 61 IN | BODY MASS INDEX: 26.21 KG/M2 | WEIGHT: 138.8 LBS

## 2024-08-09 DIAGNOSIS — R30.0 DYSURIA: ICD-10-CM

## 2024-08-09 DIAGNOSIS — Z13.89 SCREENING FOR GENITOURINARY CONDITION: ICD-10-CM

## 2024-08-09 DIAGNOSIS — R39.9 UTI SYMPTOMS: Primary | ICD-10-CM

## 2024-08-09 DIAGNOSIS — Z11.3 SCREENING FOR STDS (SEXUALLY TRANSMITTED DISEASES): ICD-10-CM

## 2024-08-09 LAB
BILIRUBIN, URINE, POC: ABNORMAL
BLOOD URINE, POC: NEGATIVE
GLUCOSE URINE, POC: NEGATIVE
KETONES, URINE, POC: ABNORMAL
LEUKOCYTE ESTERASE, URINE, POC: ABNORMAL
NITRITE, URINE, POC: POSITIVE
PH, URINE, POC: 5.5 (ref 4.6–8)
PROTEIN,URINE, POC: 30
URINALYSIS CLARITY, POC: ABNORMAL
URINALYSIS COLOR, POC: ABNORMAL
UROBILINOGEN, POC: ABNORMAL

## 2024-08-09 PROCEDURE — 99214 OFFICE O/P EST MOD 30 MIN: CPT | Performed by: NURSE PRACTITIONER

## 2024-08-09 PROCEDURE — 81002 URINALYSIS NONAUTO W/O SCOPE: CPT | Performed by: NURSE PRACTITIONER

## 2024-08-09 RX ORDER — PHENAZOPYRIDINE HYDROCHLORIDE 200 MG/1
200 TABLET, FILM COATED ORAL 3 TIMES DAILY PRN
Qty: 9 TABLET | Refills: 0 | Status: SHIPPED | OUTPATIENT
Start: 2024-08-09 | End: 2024-08-12

## 2024-08-09 RX ORDER — NITROFURANTOIN 25; 75 MG/1; MG/1
CAPSULE ORAL
Qty: 10 CAPSULE | Refills: 0 | Status: SHIPPED | OUTPATIENT
Start: 2024-08-09

## 2024-08-09 NOTE — PROGRESS NOTES
The patient is a 39 y.o.  who is seen for UTI symptoms.     Pt states she is having frequency, bladder spasms and burning with urination.  Denies itching and odor.    Interested in std check today.   No flank pain, hematuria. Noticed scant blood when wiping yesterday. No further bleeding noticed.   No fever or chills.    UA + nitrite  She is taking some leftover pyridium which has helped with urinary discomfort. Would like to have another rx to help for this episode.     She also reports vaginal pain with intercourse. Feels like a \"wall\" is being hit when she has intercourse.         HISTORY:    Patient's last menstrual period was 2023 (approximate).    Current Outpatient Medications on File Prior to Visit   Medication Sig Dispense Refill    methylphenidate (RITALIN) 20 MG tablet TAKE 1 TABLET BY MOUTH MIDDAY      ondansetron (ZOFRAN-ODT) 8 MG TBDP disintegrating tablet DISSOLVE 1 TABLET IN MOUTH EVERY 8 HOURS AS NEEDED FOR NAUSEA 30 tablet 0    albuterol (PROVENTIL) (2.5 MG/3ML) 0.083% nebulizer solution Take 3 mLs by nebulization every 6 hours as needed for Wheezing or Shortness of Breath 360 mL 11    albuterol sulfate HFA (PROVENTIL;VENTOLIN;PROAIR) 108 (90 Base) MCG/ACT inhaler Inhale 2 puffs into the lungs every 4 hours as needed for Wheezing or Shortness of Breath 1 each 11    fluticasone-salmeterol (ADVAIR DISKUS) 250-50 MCG/ACT AEPB diskus inhaler 1 inhalation twice daily, rinse mouth after use 1 each 11    EPINEPHrine (EPIPEN) 0.3 MG/0.3ML SOAJ injection       clonazePAM (KLONOPIN) 1 MG tablet       Methylphenidate HCl ER, OSM, 72 MG TBCR Take 1 tablet by mouth every morning Max Daily Amount: 1 tablet      zolpidem (AMBIEN) 10 MG tablet Take 1 tablet by mouth nightly. at bedtime      sertraline (ZOLOFT) 100 MG tablet Take 1 tablet by mouth daily      sucralfate (CARAFATE) 1 GM tablet Take 1 tablet by mouth 4 times daily       No current facility-administered medications on file prior to

## 2024-08-11 LAB
BACTERIA SPEC CULT: ABNORMAL
BACTERIA SPEC CULT: ABNORMAL
SERVICE CMNT-IMP: ABNORMAL

## 2024-08-13 LAB
C TRACH RRNA SPEC QL NAA+PROBE: NEGATIVE
N GONORRHOEA RRNA SPEC QL NAA+PROBE: NEGATIVE
SPECIMEN SOURCE: NORMAL
T VAGINALIS RRNA SPEC QL NAA+PROBE: NEGATIVE

## 2024-08-15 DIAGNOSIS — R39.9 UTI SYMPTOMS: Primary | ICD-10-CM

## 2024-08-15 DIAGNOSIS — N89.8 VAGINAL DISCHARGE: ICD-10-CM

## 2024-08-15 DIAGNOSIS — N89.8 VAGINAL IRRITATION: ICD-10-CM

## 2024-08-15 DIAGNOSIS — R30.0 DYSURIA: ICD-10-CM

## 2024-08-15 DIAGNOSIS — N89.8 VAGINAL ODOR: ICD-10-CM

## 2024-08-15 RX ORDER — FLUCONAZOLE 150 MG/1
TABLET ORAL
Qty: 2 TABLET | Refills: 0 | Status: SHIPPED | OUTPATIENT
Start: 2024-08-15

## 2024-08-15 RX ORDER — CEPHALEXIN 500 MG/1
500 CAPSULE ORAL 2 TIMES DAILY
Qty: 14 CAPSULE | Refills: 0 | Status: SHIPPED | OUTPATIENT
Start: 2024-08-15 | End: 2024-08-22

## 2024-08-29 ENCOUNTER — TELEPHONE (OUTPATIENT)
Age: 39
End: 2024-08-29

## 2024-08-29 ENCOUNTER — OFFICE VISIT (OUTPATIENT)
Age: 39
End: 2024-08-29
Payer: MEDICAID

## 2024-08-29 ENCOUNTER — PREP FOR PROCEDURE (OUTPATIENT)
Age: 39
End: 2024-08-29

## 2024-08-29 VITALS
DIASTOLIC BLOOD PRESSURE: 73 MMHG | SYSTOLIC BLOOD PRESSURE: 102 MMHG | BODY MASS INDEX: 26.21 KG/M2 | OXYGEN SATURATION: 100 % | WEIGHT: 138.8 LBS | HEIGHT: 61 IN | HEART RATE: 79 BPM | RESPIRATION RATE: 16 BRPM

## 2024-08-29 DIAGNOSIS — Z98.84 HISTORY OF GASTRIC BYPASS: ICD-10-CM

## 2024-08-29 DIAGNOSIS — R10.13 EPIGASTRIC PAIN: ICD-10-CM

## 2024-08-29 DIAGNOSIS — R13.19 ESOPHAGEAL DYSPHAGIA: ICD-10-CM

## 2024-08-29 DIAGNOSIS — K21.9 GASTROESOPHAGEAL REFLUX DISEASE, UNSPECIFIED WHETHER ESOPHAGITIS PRESENT: Primary | ICD-10-CM

## 2024-08-29 DIAGNOSIS — R11.0 NAUSEA: ICD-10-CM

## 2024-08-29 PROCEDURE — 99204 OFFICE O/P NEW MOD 45 MIN: CPT | Performed by: PHYSICIAN ASSISTANT

## 2024-08-29 RX ORDER — LANSOPRAZOLE 30 MG/1
30 CAPSULE, DELAYED RELEASE ORAL DAILY
Qty: 30 CAPSULE | Refills: 2 | Status: SHIPPED | OUTPATIENT
Start: 2024-08-29

## 2024-08-29 RX ORDER — FAMOTIDINE 40 MG/1
40 TABLET, FILM COATED ORAL EVERY EVENING
Qty: 90 TABLET | Refills: 0 | Status: SHIPPED | OUTPATIENT
Start: 2024-08-29

## 2024-08-29 NOTE — TELEPHONE ENCOUNTER
The patient in office, per provider scheduled EGD with Dr. Costa on 9/16/2024 at [facility]. Instructions given to the patient in office.

## 2024-08-29 NOTE — PROGRESS NOTES
Tere Cook (:  1985) is a 39 y.o. female new patient referred to our office for evaluation of the following chief complaint(s):  New Patient        Assessment & Plan   ASSESSMENT/PLAN:  1. Gastroesophageal reflux disease, unspecified whether esophagitis present  -     lansoprazole (PREVACID) 30 MG delayed release capsule; Take 1 capsule by mouth daily, Disp-30 capsule, R-2Normal  -     famotidine (PEPCID) 40 MG tablet; Take 1 tablet by mouth every evening, Disp-90 tablet, R-0Normal  2. Nausea  3. History of gastric bypass          Subjective   SUBJECTIVE/OBJECTIVE  Tere Cook is a 39 y.o. year old female with PMH pertinent for GERD, PUD, DIPESH, SVT, DVT, asthma, seizures, depression, anxiety. Surgical history is pertinent for cholecystectomy, , tubal ligation, hysterectomy, gastric bypass  s/p revision  **, umbilcal hernia repair with mesh.  Prior pertinent GI evaluation includes:    -EGD 2015 (Dr. Hackett): report not available for review  -EGD (Dr. Rosenberg): report not available for review  -Colonoscopy 2021 (Dr. Lin): report not available for review; Random colon biopsies consistent with lymphocytic colitis  -EGD 3/4/2022 (Dr. Costa): GE junction characterized by ulceration and erosions; no specimens collected  -EGD 3/27/2023 (Dr. Costa): Cari-en-Y gastrojejunostomy anatomy with erosion of the pouch to jejunum limb and ulceration of the jejunojejunal anastomosis s/p clip closure, unable to reach the duodenum to jejunum limb    Patient was referred to our office for evaluation of GERD, nausea, history of gastric bypass, history of gastric ulcer. Referral note reviewed from 2024.    Today patient reports ***.     Past Medical History:   Diagnosis Date    Adverse effect of anesthesia     per pt-- shakes upon awaking    Allergic rhinitis     Alopecia areata     Dr Guero Black: s/p steroid injection    Anemia, iron deficiency     Asthma     dx  age

## 2024-09-01 RX ORDER — SODIUM CHLORIDE 0.9 % (FLUSH) 0.9 %
5-40 SYRINGE (ML) INJECTION PRN
Status: CANCELLED | OUTPATIENT
Start: 2024-09-01

## 2024-09-01 RX ORDER — SODIUM CHLORIDE 9 MG/ML
25 INJECTION, SOLUTION INTRAVENOUS PRN
Status: CANCELLED | OUTPATIENT
Start: 2024-09-01

## 2024-09-01 RX ORDER — SODIUM CHLORIDE 0.9 % (FLUSH) 0.9 %
5-40 SYRINGE (ML) INJECTION EVERY 12 HOURS SCHEDULED
Status: CANCELLED | OUTPATIENT
Start: 2024-09-01

## 2024-09-16 ENCOUNTER — TELEPHONE (OUTPATIENT)
Age: 39
End: 2024-09-16

## 2024-10-29 ENCOUNTER — TELEPHONE (OUTPATIENT)
Age: 39
End: 2024-10-29

## 2024-10-29 ENCOUNTER — PATIENT MESSAGE (OUTPATIENT)
Age: 39
End: 2024-10-29

## 2024-10-29 RX ORDER — OMEPRAZOLE 40 MG/1
40 CAPSULE, DELAYED RELEASE ORAL
Qty: 60 CAPSULE | Refills: 0 | Status: SHIPPED | OUTPATIENT
Start: 2024-10-29

## 2024-10-29 NOTE — TELEPHONE ENCOUNTER
Called pt gave message from provider. Told pt she is going to received a call from our procedure  to reschedule   EGD. Pt verbalized understanding with no other concerns.

## 2024-12-02 ENCOUNTER — TELEPHONE (OUTPATIENT)
Dept: PULMONOLOGY | Age: 39
End: 2024-12-02

## 2024-12-02 ENCOUNTER — OFFICE VISIT (OUTPATIENT)
Dept: FAMILY MEDICINE CLINIC | Facility: CLINIC | Age: 39
End: 2024-12-02

## 2024-12-02 VITALS
BODY MASS INDEX: 27.94 KG/M2 | DIASTOLIC BLOOD PRESSURE: 70 MMHG | HEART RATE: 99 BPM | RESPIRATION RATE: 16 BRPM | HEIGHT: 61 IN | WEIGHT: 148 LBS | TEMPERATURE: 97.7 F | OXYGEN SATURATION: 100 % | SYSTOLIC BLOOD PRESSURE: 110 MMHG

## 2024-12-02 DIAGNOSIS — G47.00 INSOMNIA, UNSPECIFIED TYPE: ICD-10-CM

## 2024-12-02 DIAGNOSIS — J06.9 UPPER RESPIRATORY TRACT INFECTION, UNSPECIFIED TYPE: ICD-10-CM

## 2024-12-02 DIAGNOSIS — R05.1 ACUTE COUGH: Primary | ICD-10-CM

## 2024-12-02 LAB
BASOPHILS # BLD: 0.1 K/UL (ref 0–0.2)
BASOPHILS NFR BLD: 1 % (ref 0–2)
DIFFERENTIAL METHOD BLD: ABNORMAL
EOSINOPHIL # BLD: 0.6 K/UL (ref 0–0.8)
EOSINOPHIL NFR BLD: 8 % (ref 0.5–7.8)
ERYTHROCYTE [DISTWIDTH] IN BLOOD BY AUTOMATED COUNT: 13.4 % (ref 11.9–14.6)
HCT VFR BLD AUTO: 41.8 % (ref 35.8–46.3)
HGB BLD-MCNC: 13.2 G/DL (ref 11.7–15.4)
IMM GRANULOCYTES # BLD AUTO: 0 K/UL (ref 0–0.5)
IMM GRANULOCYTES NFR BLD AUTO: 0 % (ref 0–5)
INFLUENZA A ANTIGEN, POC: NEGATIVE
INFLUENZA B ANTIGEN, POC: NEGATIVE
LOT EXPIRE DATE: NORMAL
LOT KIT NUMBER: NORMAL
LYMPHOCYTES # BLD: 3 K/UL (ref 0.5–4.6)
LYMPHOCYTES NFR BLD: 37 % (ref 13–44)
MCH RBC QN AUTO: 28.9 PG (ref 26.1–32.9)
MCHC RBC AUTO-ENTMCNC: 31.6 G/DL (ref 31.4–35)
MCV RBC AUTO: 91.5 FL (ref 82–102)
MONOCYTES # BLD: 0.4 K/UL (ref 0.1–1.3)
MONOCYTES NFR BLD: 5 % (ref 4–12)
NEUTS SEG # BLD: 4 K/UL (ref 1.7–8.2)
NEUTS SEG NFR BLD: 50 % (ref 43–78)
NRBC # BLD: 0 K/UL (ref 0–0.2)
PLATELET # BLD AUTO: 278 K/UL (ref 150–450)
PMV BLD AUTO: 9.8 FL (ref 9.4–12.3)
RBC # BLD AUTO: 4.57 M/UL (ref 4.05–5.2)
RSV, POC: NEGATIVE
SARS-COV-2 RNA, POC: NEGATIVE
VALID INTERNAL CONTROL: YES
VALID INTERNAL CONTROL: YES
VENDOR AND KIT NAME POC: NORMAL
WBC # BLD AUTO: 8.1 K/UL (ref 4.3–11.1)

## 2024-12-02 RX ORDER — LEVOFLOXACIN 750 MG/1
750 TABLET, FILM COATED ORAL DAILY
Qty: 5 TABLET | Refills: 0 | Status: SHIPPED | OUTPATIENT
Start: 2024-12-02 | End: 2024-12-07

## 2024-12-02 RX ORDER — PREDNISONE 5 MG/1
TABLET ORAL
Qty: 21 EACH | Refills: 0 | Status: SHIPPED | OUTPATIENT
Start: 2024-12-02

## 2024-12-02 RX ORDER — ZOLPIDEM TARTRATE 10 MG/1
10 TABLET ORAL NIGHTLY PRN
Qty: 14 TABLET | Refills: 0 | Status: SHIPPED | OUTPATIENT
Start: 2024-12-02 | End: 2024-12-16

## 2024-12-02 SDOH — ECONOMIC STABILITY: FOOD INSECURITY: WITHIN THE PAST 12 MONTHS, YOU WORRIED THAT YOUR FOOD WOULD RUN OUT BEFORE YOU GOT MONEY TO BUY MORE.: NEVER TRUE

## 2024-12-02 SDOH — ECONOMIC STABILITY: FOOD INSECURITY: WITHIN THE PAST 12 MONTHS, THE FOOD YOU BOUGHT JUST DIDN'T LAST AND YOU DIDN'T HAVE MONEY TO GET MORE.: NEVER TRUE

## 2024-12-02 SDOH — ECONOMIC STABILITY: INCOME INSECURITY: HOW HARD IS IT FOR YOU TO PAY FOR THE VERY BASICS LIKE FOOD, HOUSING, MEDICAL CARE, AND HEATING?: NOT HARD AT ALL

## 2024-12-02 ASSESSMENT — PATIENT HEALTH QUESTIONNAIRE - PHQ9
SUM OF ALL RESPONSES TO PHQ9 QUESTIONS 1 & 2: 0
SUM OF ALL RESPONSES TO PHQ QUESTIONS 1-9: 0
SUM OF ALL RESPONSES TO PHQ QUESTIONS 1-9: 0
1. LITTLE INTEREST OR PLEASURE IN DOING THINGS: NOT AT ALL
2. FEELING DOWN, DEPRESSED OR HOPELESS: NOT AT ALL
SUM OF ALL RESPONSES TO PHQ QUESTIONS 1-9: 0
SUM OF ALL RESPONSES TO PHQ QUESTIONS 1-9: 0

## 2024-12-02 ASSESSMENT — ENCOUNTER SYMPTOMS
CHOKING: 0
SHORTNESS OF BREATH: 1
EYES NEGATIVE: 1
GASTROINTESTINAL NEGATIVE: 1
STRIDOR: 0
SORE THROAT: 0
COUGH: 1
WHEEZING: 1
APNEA: 0
FACIAL SWELLING: 0

## 2024-12-02 NOTE — PROGRESS NOTES
Tere Cook (:  1985) is a 39 y.o. female,Established patient, here for evaluation of the following chief complaint(s):  Other (Hospitalized for Septic pneumonia and myocytics she recovered and 1 week ago cough came back and she can't gt rid of it  )         Assessment & Plan  Acute cough   New, uncertain prognosis, continue current plan pending work up below and medication adherence emphasized    Orders:    AMB POC SARS-COV-2 AND INFLUENZA A/B    AMB POC RSV    Upper respiratory tract infection, unspecified type   New, not at goal (unstable), continue current plan pending work up below and medication adherence emphasized    Orders:    levoFLOXacin (LEVAQUIN) 750 MG tablet; Take 1 tablet by mouth daily for 5 days    XR CHEST PA LAT (2 VIEWS); Future    CBC with Auto Differential; Future    Comprehensive Metabolic Panel; Future    Insomnia, unspecified type   Gave her to take instead of seroquel r/t qt prolongation.    Orders:    zolpidem (AMBIEN) 10 MG tablet; Take 1 tablet by mouth nightly as needed for Sleep for up to 14 days. Max Daily Amount: 10 mg    Start treatment- if symptoms worsen recommend going to ER. Please start medicine as prescribed. Increase fluids and get plenty of rest.    Follow up 2025- autoimmune testing.        Subjective   HPI  For sick symptoms that started 6 days ago. Symptoms are getting worse. Phlegm color is changing. Symptoms consist of cough, fever, wheezing, headache,  chest pain with cough. No nausea or vomiting. No sinus pressure, ear ache. Nasal congestion. Had fever today of 103 F. No sore throat. For symptoms taking tylenol, mucinex, albuterol. Has nebulizer and inhaler. Inhaler and nebulizer helps. Mucinex breaks up the phlegm. Symptoms are worse in the morning.   Review of Systems   Constitutional:  Positive for fatigue and fever.   HENT:  Positive for congestion. Negative for ear discharge, ear pain, facial swelling and sore throat.    Eyes: Negative.

## 2024-12-02 NOTE — TELEPHONE ENCOUNTER
Last seen: 12/27/23  Hx: severe asthma, chronic, recurrent sinusitis, nasal plyps    Sent in [employer] msg 11/29 w/:  \"  About a month ago I was hospitalized with sepsis pneumonia,  I’ve developed a productive cough again with yellow-greenish phlegm again.. I’m very tight, short of breath, wheezing.. suggestions? \"    Patient call reporting wheezing, chest pain, productive cough w/ thick, green sputum, fever 103.2, concern for repeat PNA.   Chart notes indicate 10/22/24 admission to [facility] w/ PNA dx. Contacted patient regarding symptoms, able to take routine medications including nebulizer, advised no open appts in our office today and being seen at urgent care would facilitate CXR; taking tylenol for fever only able to get down to 101; patient asking if our MD would be willing to send something in. Confirmed pharmacy on file.

## 2024-12-02 NOTE — TELEPHONE ENCOUNTER
The patient has is wheezing, chest pain and coughing up thick green mucous and a fever of 103.2 this morning. She feels like she has pneumonia again.

## 2024-12-03 LAB
ALBUMIN SERPL-MCNC: 3.8 G/DL (ref 3.5–5)
ALBUMIN/GLOB SERPL: 1.2 (ref 1–1.9)
ALP SERPL-CCNC: 56 U/L (ref 35–104)
ALT SERPL-CCNC: 14 U/L (ref 8–45)
ANION GAP SERPL CALC-SCNC: 11 MMOL/L (ref 7–16)
AST SERPL-CCNC: 21 U/L (ref 15–37)
BILIRUB SERPL-MCNC: 0.2 MG/DL (ref 0–1.2)
BUN SERPL-MCNC: 14 MG/DL (ref 6–23)
CALCIUM SERPL-MCNC: 9.3 MG/DL (ref 8.8–10.2)
CHLORIDE SERPL-SCNC: 105 MMOL/L (ref 98–107)
CO2 SERPL-SCNC: 22 MMOL/L (ref 20–29)
CREAT SERPL-MCNC: 0.92 MG/DL (ref 0.6–1.1)
GLOBULIN SER CALC-MCNC: 3.2 G/DL (ref 2.3–3.5)
GLUCOSE SERPL-MCNC: 84 MG/DL (ref 70–99)
POTASSIUM SERPL-SCNC: 4.3 MMOL/L (ref 3.5–5.1)
PROT SERPL-MCNC: 7 G/DL (ref 6.3–8.2)
SODIUM SERPL-SCNC: 138 MMOL/L (ref 136–145)

## 2025-02-21 ENCOUNTER — OFFICE VISIT (OUTPATIENT)
Dept: FAMILY MEDICINE CLINIC | Facility: CLINIC | Age: 40
End: 2025-02-21
Payer: MEDICAID

## 2025-02-21 VITALS
WEIGHT: 141 LBS | OXYGEN SATURATION: 99 % | TEMPERATURE: 98.9 F | HEART RATE: 84 BPM | RESPIRATION RATE: 17 BRPM | SYSTOLIC BLOOD PRESSURE: 120 MMHG | DIASTOLIC BLOOD PRESSURE: 80 MMHG | BODY MASS INDEX: 26.62 KG/M2 | HEIGHT: 61 IN

## 2025-02-21 DIAGNOSIS — M60.9 MYOSITIS OF LEFT LOWER EXTREMITY, UNSPECIFIED MYOSITIS TYPE: ICD-10-CM

## 2025-02-21 DIAGNOSIS — M25.552 LEFT HIP PAIN: ICD-10-CM

## 2025-02-21 DIAGNOSIS — R63.4 WEIGHT LOSS: ICD-10-CM

## 2025-02-21 DIAGNOSIS — B35.4 TINEA CORPORIS: ICD-10-CM

## 2025-02-21 DIAGNOSIS — M60.9 MYOSITIS OF LEFT LOWER EXTREMITY, UNSPECIFIED MYOSITIS TYPE: Primary | ICD-10-CM

## 2025-02-21 LAB
ALBUMIN SERPL-MCNC: 4 G/DL (ref 3.5–5)
ALBUMIN/GLOB SERPL: 1.5 (ref 1–1.9)
ALP SERPL-CCNC: 49 U/L (ref 35–104)
ALT SERPL-CCNC: 12 U/L (ref 8–45)
ANION GAP SERPL CALC-SCNC: 10 MMOL/L (ref 7–16)
AST SERPL-CCNC: 16 U/L (ref 15–37)
BASOPHILS # BLD: 0.09 K/UL (ref 0–0.2)
BASOPHILS NFR BLD: 1.7 % (ref 0–2)
BILIRUB SERPL-MCNC: 0.2 MG/DL (ref 0–1.2)
BUN SERPL-MCNC: 11 MG/DL (ref 6–23)
CALCIUM SERPL-MCNC: 9.2 MG/DL (ref 8.8–10.2)
CHLORIDE SERPL-SCNC: 109 MMOL/L (ref 98–107)
CO2 SERPL-SCNC: 22 MMOL/L (ref 20–29)
CREAT SERPL-MCNC: 0.85 MG/DL (ref 0.6–1.1)
CRP SERPL-MCNC: <0.3 MG/DL (ref 0–0.4)
DIFFERENTIAL METHOD BLD: ABNORMAL
EOSINOPHIL # BLD: 0.56 K/UL (ref 0–0.8)
EOSINOPHIL NFR BLD: 10.7 % (ref 0.5–7.8)
ERYTHROCYTE [DISTWIDTH] IN BLOOD BY AUTOMATED COUNT: 13.2 % (ref 11.9–14.6)
GLOBULIN SER CALC-MCNC: 2.6 G/DL (ref 2.3–3.5)
GLUCOSE SERPL-MCNC: 98 MG/DL (ref 70–99)
HCT VFR BLD AUTO: 39.5 % (ref 35.8–46.3)
HGB BLD-MCNC: 12.9 G/DL (ref 11.7–15.4)
IMM GRANULOCYTES # BLD AUTO: 0.01 K/UL (ref 0–0.5)
IMM GRANULOCYTES NFR BLD AUTO: 0.2 % (ref 0–5)
LYMPHOCYTES # BLD: 1.43 K/UL (ref 0.5–4.6)
LYMPHOCYTES NFR BLD: 27.4 % (ref 13–44)
MCH RBC QN AUTO: 29.3 PG (ref 26.1–32.9)
MCHC RBC AUTO-ENTMCNC: 32.7 G/DL (ref 31.4–35)
MCV RBC AUTO: 89.6 FL (ref 82–102)
MONOCYTES # BLD: 0.18 K/UL (ref 0.1–1.3)
MONOCYTES NFR BLD: 3.4 % (ref 4–12)
NEUTS SEG # BLD: 2.95 K/UL (ref 1.7–8.2)
NEUTS SEG NFR BLD: 56.6 % (ref 43–78)
NRBC # BLD: 0 K/UL (ref 0–0.2)
PLATELET # BLD AUTO: 227 K/UL (ref 150–450)
PMV BLD AUTO: 10.5 FL (ref 9.4–12.3)
POTASSIUM SERPL-SCNC: 4.2 MMOL/L (ref 3.5–5.1)
PROT SERPL-MCNC: 6.5 G/DL (ref 6.3–8.2)
RBC # BLD AUTO: 4.41 M/UL (ref 4.05–5.2)
SODIUM SERPL-SCNC: 141 MMOL/L (ref 136–145)
TSH, 3RD GENERATION: 2.02 UIU/ML (ref 0.27–4.2)
WBC # BLD AUTO: 5.2 K/UL (ref 4.3–11.1)

## 2025-02-21 PROCEDURE — 99214 OFFICE O/P EST MOD 30 MIN: CPT | Performed by: FAMILY MEDICINE

## 2025-02-21 RX ORDER — PRENATAL VIT 91/IRON/FOLIC/DHA 28-975-200
COMBINATION PACKAGE (EA) ORAL
Qty: 12 G | Refills: 1 | Status: SHIPPED | OUTPATIENT
Start: 2025-02-21

## 2025-02-21 SDOH — ECONOMIC STABILITY: FOOD INSECURITY: WITHIN THE PAST 12 MONTHS, THE FOOD YOU BOUGHT JUST DIDN'T LAST AND YOU DIDN'T HAVE MONEY TO GET MORE.: NEVER TRUE

## 2025-02-21 SDOH — ECONOMIC STABILITY: FOOD INSECURITY: WITHIN THE PAST 12 MONTHS, YOU WORRIED THAT YOUR FOOD WOULD RUN OUT BEFORE YOU GOT MONEY TO BUY MORE.: NEVER TRUE

## 2025-02-21 ASSESSMENT — PATIENT HEALTH QUESTIONNAIRE - PHQ9
1. LITTLE INTEREST OR PLEASURE IN DOING THINGS: NOT AT ALL
SUM OF ALL RESPONSES TO PHQ QUESTIONS 1-9: 0
SUM OF ALL RESPONSES TO PHQ9 QUESTIONS 1 & 2: 0
SUM OF ALL RESPONSES TO PHQ QUESTIONS 1-9: 0
2. FEELING DOWN, DEPRESSED OR HOPELESS: NOT AT ALL
SUM OF ALL RESPONSES TO PHQ QUESTIONS 1-9: 0
SUM OF ALL RESPONSES TO PHQ QUESTIONS 1-9: 0

## 2025-02-21 NOTE — ASSESSMENT & PLAN NOTE
These 2 small lesions on her right forearm may well be tinea.  She has used some steroid cream with no resolution so empirically and giving terbinafine cream to be used twice daily for 7 to 14 days to see if we can make some improvement.

## 2025-02-21 NOTE — PROGRESS NOTES
Tere Cook (:  1985) is a 39 y.o. female,Established patient, here for evaluation of the following chief complaint(s):  Rash (On right arm, started about 2 months ) and Other (Follow up on autoimmune testing)         Assessment & Plan  Left hip pain  MRI during the hospitalization for pneumonia last fall demonstrated radiographic evidence of myositis that matched her clinical presentation.  Apparently she had very elevated inflammatory markers at the time.  The patient once a broad screening type rheumatologic workup and I have placed that today.  Also check basic labs and a TSH.    Orders:    AMB POC SEDIMENTATION RATE, ERYTHROCYTE; NON AUTO    C-Reactive Protein; Future    MONISHA, Direct, w/Reflex; Future    Anti-DNA Antibody, Double-Stranded; Future    Rheumatoid Factor; Future    Myositis Panel 3; Future    CBC with Auto Differential; Future    Comprehensive Metabolic Panel; Future    TSH; Future    Myositis of left lower extremity, unspecified myositis type  Labs ordered today will help determine if you are still having a myositis type syndrome in the specific area or something else more generalized that is occurring.    Orders:    AMB POC SEDIMENTATION RATE, ERYTHROCYTE; NON AUTO    C-Reactive Protein; Future    MONISHA, Direct, w/Reflex; Future    Anti-DNA Antibody, Double-Stranded; Future    Rheumatoid Factor; Future    Myositis Panel 3; Future    CBC with Auto Differential; Future    Comprehensive Metabolic Panel; Future    TSH; Future    Tinea corporis  These 2 small lesions on her right forearm may well be tinea.  She has used some steroid cream with no resolution so empirically and giving terbinafine cream to be used twice daily for 7 to 14 days to see if we can make some improvement.         Weight loss  I am actually concerned about this weight loss that she is experienced since July of last year and the trouble she is having with nausea and vomiting.  She does not have pronounced

## 2025-02-21 NOTE — ASSESSMENT & PLAN NOTE
MRI during the hospitalization for pneumonia last fall demonstrated radiographic evidence of myositis that matched her clinical presentation.  Apparently she had very elevated inflammatory markers at the time.  The patient once a broad screening type rheumatologic workup and I have placed that today.  Also check basic labs and a TSH.    Orders:    AMB POC SEDIMENTATION RATE, ERYTHROCYTE; NON AUTO    C-Reactive Protein; Future    MONISHA, Direct, w/Reflex; Future    Anti-DNA Antibody, Double-Stranded; Future    Rheumatoid Factor; Future    Myositis Panel 3; Future    CBC with Auto Differential; Future    Comprehensive Metabolic Panel; Future    TSH; Future

## 2025-02-21 NOTE — ASSESSMENT & PLAN NOTE
Labs ordered today will help determine if you are still having a myositis type syndrome in the specific area or something else more generalized that is occurring.    Orders:    AMB POC SEDIMENTATION RATE, ERYTHROCYTE; NON AUTO    C-Reactive Protein; Future    MONISHA, Direct, w/Reflex; Future    Anti-DNA Antibody, Double-Stranded; Future    Rheumatoid Factor; Future    Myositis Panel 3; Future    CBC with Auto Differential; Future    Comprehensive Metabolic Panel; Future    TSH; Future

## 2025-02-22 LAB
ANA SER QL: NEGATIVE
DSDNA AB SER-ACNC: <1 IU/ML (ref 0–9)

## 2025-02-24 LAB — RHEUMATOID FACT SER QL LA: NEGATIVE

## 2025-02-27 LAB
ENA JO1 AB SER QL: <20 UNITS
ENA SSA 52KD AB: <20 UNITS
MDA5 ANTIBODY: <20 UNITS
PM-SCL ANTIBODY: <20 UNITS
TIF1-GAMMA ANTIBODY: <20 UNITS
U-1 SN RNP ANTIBODIES: <20 UNITS

## 2025-03-10 LAB
ENA JO1 AB SER QL: <20 UNITS
ENA SSA 52KD AB: <20 UNITS
MDA5 ANTIBODY: <20 UNITS
NXP-2 ANTIBODY: NORMAL UNITS
PM-SCL ANTIBODY: <20 UNITS
TIF1-GAMMA ANTIBODY: <20 UNITS
U-1 SN RNP ANTIBODIES: <20 UNITS

## 2025-03-18 LAB
EJ AB SER QL: NEGATIVE
ENA JO1 AB SER QL: <20 UNITS
ENA SSA 52KD AB: <20 UNITS
KU AB SER QL: NEGATIVE
MDA5 ANTIBODY: <20 UNITS
MI2 AB SER QL: NEGATIVE
NXP-2 ANTIBODY: NORMAL UNITS
OJ AB SER QL: NEGATIVE
PL12 AB SER QL: NEGATIVE
PL7 AB SER QL: NEGATIVE
PM-SCL ANTIBODY: <20 UNITS
SRP AB SERPL QL: NEGATIVE
TIF1-GAMMA ANTIBODY: <20 UNITS
U-1 SN RNP ANTIBODIES: <20 UNITS
U2 SMALL NUCLEAR RNP AB: NEGATIVE
U3, RNP AB, IGG: NEGATIVE

## 2025-04-30 NOTE — Clinical Note
Tere Garcia Tiffanienarinder                                                                [address] [place] [id number]         4/30/25     Dear Ms. Cook:  MRN:[id number]    This message is regarding a referral that needs to be scheduled. We were unable to reach you by phone; however, your referral is available for review in [facility] under insurance in the drop down menu. We will be making further attempts to contact you to get this scheduled.       Thank you,  [facility]

## 2025-05-23 ENCOUNTER — PATIENT MESSAGE (OUTPATIENT)
Dept: PULMONOLOGY | Age: 40
End: 2025-05-23

## 2025-05-23 NOTE — TELEPHONE ENCOUNTER
She has not been seen since 12/2023. That was telemedicine visit.    Please call her to clarify her question and obtain updated hx, appears that dupixent has been prescribed, but I cannot confirm that on review of her record.      I see that there is documentation of eosinophilic esophagitis but appears that Flovent was prescribed.    She needs a follow-up appointment.  40 minutes, spirometry, MD or me.

## (undated) DEVICE — SUTURE COAT VCRL SZ 4-0 L18IN ABSRB UD L19MM PS-2 1/2 CIR J496G

## (undated) DEVICE — BLADELESS OPTICAL TROCAR WITH FIXATION CANNULA: Brand: VERSAONE

## (undated) DEVICE — NEEDLE HYPO 21GA L1.5IN INTRAMUSCULAR S STL LATCH BVL UP

## (undated) DEVICE — DECANTER BAG 9": Brand: MEDLINE INDUSTRIES, INC.

## (undated) DEVICE — INTENDED FOR TISSUE SEPARATION, AND OTHER PROCEDURES THAT REQUIRE A SHARP SURGICAL BLADE TO PUNCTURE OR CUT.: Brand: BARD-PARKER ® STAINLESS STEEL BLADES

## (undated) DEVICE — CANISTER, RIGID, 2000CC: Brand: MEDLINE INDUSTRIES, INC.

## (undated) DEVICE — CONTAINER PREFIL FRMLN 40ML --

## (undated) DEVICE — CANNULA NSL ORAL AD FOR CAPNOFLEX CO2 O2 AIRLFE

## (undated) DEVICE — CONNECTOR TBNG OD5-7MM O2 END DISP

## (undated) DEVICE — KENDALL RADIOLUCENT FOAM MONITORING ELECTRODE RECTANGULAR SHAPE: Brand: KENDALL

## (undated) DEVICE — UNIVERSAL FIXATION CANNULA: Brand: VERSAONE

## (undated) DEVICE — BLOCK BITE AD 60FR W/ VELC STRP ADDRESSES MOST PT AND

## (undated) DEVICE — TRI-LUMEN FILTERED TUBE SET WITH ACTIVATED CHARCOAL FILTER: Brand: AIRSEAL

## (undated) DEVICE — DEVICE FIX 5MM TI FOR LAP HERN REP PROTACK

## (undated) DEVICE — KENDALL SCD EXPRESS SLEEVES, KNEE LENGTH, MEDIUM: Brand: KENDALL SCD

## (undated) DEVICE — 1840 FOAM BLOCK NEEDLE COUNTER: Brand: DEVON

## (undated) DEVICE — SOLUTION IRRIG 3000ML 0.9% SOD CHL USP UROMATIC PLAS CONT

## (undated) DEVICE — SUTURE SZ 0 27IN 5/8 CIR UR-6  TAPER PT VIOLET ABSRB VICRYL J603H

## (undated) DEVICE — GARMENT,MEDLINE,DVT,INT,CALF,MED, GEN2: Brand: MEDLINE

## (undated) DEVICE — INSUFFLATION NEEDLE TO ESTABLISH PNEUMOPERITONEUM.: Brand: INSUFFLATION NEEDLE

## (undated) DEVICE — FORCEPS BX L240CM JAW DIA2.8MM L CAP W/ NDL MIC MESH TOOTH

## (undated) DEVICE — BLADELESS OPTICAL TROCAR WITH FIXATION CANNULA: Brand: VERSAPORT

## (undated) DEVICE — SOL ANTI-FOG 6ML MEDC -- MEDICHOICE - CONVERT TO 358427

## (undated) DEVICE — GLOVE SURG SZ 6 THK91MIL LTX FREE SYN POLYISOPRENE ANTI

## (undated) DEVICE — SUTURE STRATAFIX SPRL PDS + SZ 2-0 L6IN ABSRB VLT L36MM SXPP1B409

## (undated) DEVICE — SOLUTION IRRIG 1000ML 0.9% SOD CHL USP POUR PLAS BTL

## (undated) DEVICE — SYRINGE IRRIG 60ML SFT PLIABLE BLB EZ TO GRP 1 HND USE W/

## (undated) DEVICE — APPLICATOR BNDG 1MM ADH PREMIERPRO EXOFIN

## (undated) DEVICE — LAPAROSCOPIC TROCAR SLEEVE/SINGLE USE: Brand: KII® OPTICAL ACCESS SYSTEM

## (undated) DEVICE — SUTURE VCRL SZ 0 L36IN ABSRB UD L36MM CT-1 1/2 CIR J946H

## (undated) DEVICE — AIRSEAL 5 MM ACCESS PORT AND LOW PROFILE OBTURATOR WITH BLADELESS OPTICAL TIP, 120 MM LENGTH: Brand: AIRSEAL

## (undated) DEVICE — TRAY CATH 16F DRN BG LTX -- CONVERT TO ITEM 363158

## (undated) DEVICE — (D)PREP SKN CHLRAPRP APPL 26ML -- CONVERT TO ITEM 371833

## (undated) DEVICE — LAP CHOLE: Brand: MEDLINE INDUSTRIES, INC.

## (undated) DEVICE — LOGICUT SCISSOR LENGTH 320MM: Brand: LOGI - LAPAROSCOPIC INSTRUMENT SYSTEM

## (undated) DEVICE — SHEARS ENDOSCP HARM 36CM ULTRASONIC CRV TIP UPGRD

## (undated) DEVICE — [HIGH FLOW INSUFFLATOR,  DO NOT USE IF PACKAGE IS DAMAGED,  KEEP DRY,  KEEP AWAY FROM SUNLIGHT,  PROTECT FROM HEAT AND RADIOACTIVE SOURCES.]: Brand: PNEUMOSURE

## (undated) DEVICE — GYN LAPAROSCOPY: Brand: MEDLINE INDUSTRIES, INC.

## (undated) DEVICE — SHEARS ENDOSCP L36CM DIA5MM ULTRASONIC CRV TIP W/ ADV

## (undated) DEVICE — TROCAR: Brand: KII FIOS FIRST ENTRY

## (undated) DEVICE — REM POLYHESIVE ADULT PATIENT RETURN ELECTRODE: Brand: VALLEYLAB

## (undated) DEVICE — ADHESIVE SKIN CLSR 0.7ML TOP DERMBND ADV

## (undated) DEVICE — PAD PT POS 36 IN SURGYPAD DISP

## (undated) DEVICE — VCARE LARGE UTERINE MANIPULATOR: Brand: VCARE LARGE